# Patient Record
Sex: FEMALE | Race: WHITE | NOT HISPANIC OR LATINO | Employment: OTHER | URBAN - METROPOLITAN AREA
[De-identification: names, ages, dates, MRNs, and addresses within clinical notes are randomized per-mention and may not be internally consistent; named-entity substitution may affect disease eponyms.]

---

## 2018-10-16 ENCOUNTER — OFFICE VISIT (OUTPATIENT)
Dept: OTHER | Facility: IMAGING CENTER | Age: 68
End: 2018-10-16
Payer: MEDICARE

## 2018-10-16 ENCOUNTER — APPOINTMENT (OUTPATIENT)
Dept: OTHER | Facility: IMAGING CENTER | Age: 68
End: 2018-10-16

## 2018-10-16 VITALS
HEIGHT: 62 IN | TEMPERATURE: 97.3 F | DIASTOLIC BLOOD PRESSURE: 58 MMHG | OXYGEN SATURATION: 100 % | BODY MASS INDEX: 28.11 KG/M2 | SYSTOLIC BLOOD PRESSURE: 102 MMHG | HEART RATE: 58 BPM | RESPIRATION RATE: 12 BRPM | WEIGHT: 152.78 LBS

## 2018-10-16 DIAGNOSIS — Z23 NEED FOR INFLUENZA VACCINATION: ICD-10-CM

## 2018-10-16 DIAGNOSIS — N95.2 POSTMENOPAUSE ATROPHIC VAGINITIS: ICD-10-CM

## 2018-10-16 DIAGNOSIS — F51.01 PRIMARY INSOMNIA: ICD-10-CM

## 2018-10-16 DIAGNOSIS — M22.2X2 BILATERAL PATELLOFEMORAL SYNDROME: ICD-10-CM

## 2018-10-16 DIAGNOSIS — M22.2X1 BILATERAL PATELLOFEMORAL SYNDROME: ICD-10-CM

## 2018-10-16 DIAGNOSIS — G43.019 INTRACTABLE MIGRAINE WITHOUT AURA AND WITHOUT STATUS MIGRAINOSUS: ICD-10-CM

## 2018-10-16 DIAGNOSIS — E78.2 MIXED HYPERLIPIDEMIA: ICD-10-CM

## 2018-10-16 DIAGNOSIS — J45.991 COUGH VARIANT ASTHMA: ICD-10-CM

## 2018-10-16 PROBLEM — Z90.710 HX OF TOTAL HYSTERECTOMY: Status: ACTIVE | Noted: 2018-10-16

## 2018-10-16 PROCEDURE — G0008 ADMIN INFLUENZA VIRUS VAC: HCPCS | Performed by: FAMILY MEDICINE

## 2018-10-16 PROCEDURE — 99205 OFFICE O/P NEW HI 60 MIN: CPT | Mod: 25 | Performed by: FAMILY MEDICINE

## 2018-10-16 PROCEDURE — 90662 IIV NO PRSV INCREASED AG IM: CPT | Performed by: FAMILY MEDICINE

## 2018-10-16 RX ORDER — ZOLPIDEM TARTRATE 5 MG/1
5 TABLET ORAL NIGHTLY PRN
COMMUNITY
End: 2019-05-21 | Stop reason: SDUPTHER

## 2018-10-16 RX ORDER — ESTRADIOL 0.5 MG/1
TABLET ORAL
COMMUNITY
Start: 2018-06-08 | End: 2019-05-31

## 2018-10-16 ASSESSMENT — PATIENT HEALTH QUESTIONNAIRE - PHQ9: CLINICAL INTERPRETATION OF PHQ2 SCORE: 0

## 2018-10-16 NOTE — ASSESSMENT & PLAN NOTE
Bilateral inner knee to have alternating tenderness after a wide range of postural exercise.  Patient has noticed the intermittent knee tenderness after prolonged periods of time in intense exercise such as CrossFit training.

## 2018-10-16 NOTE — ASSESSMENT & PLAN NOTE
Over the course of last 25 years patient has been seen to have additional stressors in life.  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

## 2018-10-16 NOTE — PROGRESS NOTES
"Chief Complaint   Patient presents with   • Establish Care     would like to set up for accupuncture     Audrey Aquino is a 68 y.o. female who usually sees her [facility] physician presents today to establish care at [facility] and to discuss bilateral patellofemoral syndrome.    HPI:  Primary insomnia  Over the course of last 25 years patient has been seen to have additional stressors in life.  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    Bilateral patellofemoral syndrome  Bilateral inner knee to have alternating tenderness after a wide range of postural exercise.  Patient has noticed the intermittent knee tenderness after prolonged periods of time in intense exercise such as CrossFit training.    Cough variant asthma  Patient is a chronic problem of cough variant asthma and has been using intermittent Advair to supplement the daily mixed mushroom supplement.  Patient stated that she used to have asthmatic attack every seasonal change which has improved dramatically in the last year since her start of mushroom    Intractable migraine without aura and without status migrainosus  Patient is intractable migraine was originally scheduled to be treated with twice weekly acupuncture which she had find to be having limited utility but does have significant improvement in her primary insomnia therefore patient had been continued with her acupuncture therapy in [place].  Since her moving out of the [place] region and away from [facility] that she felt the sense of \"lost\" from the well-cared-for system.    Mixed hyperlipidemia  Patient was found to be having mixed hyperlipidemia but has not prefer not to take any statin related drug medication.  Patient would like to have additional assistance in " how she can reduce her risk in cardiovascular disease.    Postmenopause atrophic vaginitis  Patient described as having atrophic vaginitis and stated that in the past her OB has put her on estradiol tablet intravaginally off label use.  Discussed with patient of the possibility of using compounding pharmacy and having a more pleasant experience with delivering localized estrogen replacement.  Patient verbalized desire to discuss further.    Depression Screening    Little interest or pleasure in doing things?  0 - not at all  Feeling down, depressed , or hopeless? 0 - not at all  Patient Health Questionnaire Score: 0    If depressive symptoms identified deferred to follow up visit unless specifically addressed in assesment and plan.      Interpretation of PHQ-9 Total Score   Score Severity   1-4 Minimal Depression   5-9 Mild Depression   10-14 Moderate Depression   15-19 Moderately Severe Depression   20-27 Severe Depression    Current medicines (including changes today)  Current Outpatient Prescriptions   Medication Sig Dispense Refill   • fluticasone-salmeterol (ADVAIR DISKUS) 250-50 MCG/DOSE AEROSOL POWDER, BREATH ACTIVATED Inhale 1 puff by mouth 2 times a day . Rinse mouth well after use. (Note: this replaces Dulera)     • tretinoin (RETIN-A) 0.025 % cream Apply to affected area(s) daily at bedtime     • estradiol (ESTRACE) 0.5 MG tablet Take  by mouth.     • zolpidem (AMBIEN) 5 MG Tab Take 5 mg by mouth at bedtime as needed for Sleep.     • CALCIUM-MAGNESIUM PO Take  by mouth.       No current facility-administered medications for this visit.      She  has a past medical history of Asthma; Dry eyes; Frequent headaches; Gingivitis; History of hormone therapy; Hyperlipidemia; Knee pain; Overweight; Shoulder injury; and Sleep disorder.  She  has a past surgical history that includes eye surgery.  Social History   Substance Use Topics   • Smoking status: Never Smoker   • Smokeless tobacco: Never Used   • Alcohol use  "Yes      Comment: 1-2 drinks a week maybe     Family History   Problem Relation Age of Onset   • Cancer Mother         breast     Family Status   Relation Status   • Mo (Not Specified)     Social History     Social History Narrative   • No narrative on file     ROS  Constitutional: Negative for fever, chills, weight loss and malaise/fatigue.   HENT: Negative for ear pain, nosebleeds, congestion, sore throat and neck pain.    Eyes: Negative for blurred vision.   Respiratory: Negative for sputum production, shortness of breath and wheezing.  Positive for infrequent cough,   Cardiovascular: Negative for chest pain, palpitations, orthopnea and leg swelling.   Gastrointestinal: Negative for heartburn, nausea, vomiting and abdominal pain.   Genitourinary: Negative for dysuria, urgency and frequency.   Musculoskeletal: Negative for myalgias, back pain, positive for intermittent alternating knee joint pain post exercise.   Skin: Negative for rash and itching.   Neurological: Negative for dizziness, tingling, tremors, sensory change, focal weakness, positive for frequent daily headaches.   Endo/Heme/Allergies: Does not bruise/bleed easily.   Psychiatric/Behavioral: Negative for depression, anxiety, or memory loss.     All other systems reviewed and are negative except as in HPI.    Labs reviewed with patient:  Reviewed laboratory data from care anywhere from [place].     Objective:   Blood pressure 102/58, pulse (!) 58, temperature 36.3 °C (97.3 °F), temperature source Temporal, resp. rate 12, height 1.575 m (5' 2\"), weight 69.3 kg (152 lb 12.5 oz), SpO2 100 %. Body mass index is 27.94 kg/m².  Physical Exam:  Constitutional: Alert, no distress.  Skin: Warm, dry, good turgor, no rashes in visible areas.  Eye: Equal, round and reactive, conjunctiva clear, lids normal.  ENMT: Lips without lesions, good dentition, oropharynx clear.  Neck: Trachea midline, no masses, no thyromegaly.  Respiratory: Unlabored respiratory effort, " lungs clear to auscultation, no wheezes, no ronchi.  Cardiovascular: Normal S1, S2, no murmur, no edema.  Abdomen: Soft, non-tender, no masses, no hepatosplenomegaly.  Psych: Alert and oriented x3, normal affect and mood.    Assessment and Plan:   The following treatment plan was discussed  1. Cough variant asthma      Discussed of the continual in her mushroom supplement, also continue with lifestyle change in combination with acupuncture.  Can use at.  Discussed Advair use 250-50 1 inhalation prior to bronchospasm to reduce severity of the preceding symptom.   2. Intractable migraine without aura and without status migrainosus      Advised to use prime tea daily for next 6-months, discussed the risks and benefit and possible side effect.  Also continue to use acupuncture with headache reductions and utilize breathing exercise along with guided imagery to help alleviate the severity of the headache.  Discussion ensued about micro-biome.   3. Primary insomnia      Can continue Ambien 5 mg as needed intermittently.  Also discussed with patient of the likely benefit using guided imagery nightly.  Should that is not well tolerated.  Advised to possibly benefit from breathing exercise 2-3 times a day along with her prayer.  Additional discussion about the utility in CBD tincture in the future as her past experience with [employer] cannabis strains.   4. Bilateral patellofemoral syndrome      Discussed the possible benefit from acupuncture and effects in continue with sessions with Dr. Macias.  Also likely will benefit from freeze-dried stinging nettle 450 mg up to twice a day for next 3-month in combination with topical plus CBD cream    5. Postmenopause atrophic vaginitis      Discussed with patient of the present exercise twice a day, and effect of anxiety to the atrophic vaginitis.  Can continue off label use of estradiol vaginally,   6. Mixed hyperlipidemia      Advised to use Natural made cholestoff 1 capsule daily and  combination with lifestyle changes with plans to recheck her lipid panel in Jan 2019   7.      Influenza vaccine           Risks and benefit of flu vaccine discussed patient agrees to take high dose influenza vaccine in office today.    Records requested.  Followup: Return in about 5 weeks (around 11/20/2018).    Spent 65 minutes in face-to-tace patient contact in which greater than 50% of the visit was spent in counseling and coordination of care including Patellofemoral syndrome management options, Answering patient questions about Primary insomnia, Concerns and potential risks related to Intractable migraine, Discussing in depth the importance of primary prevention of Status asthmaticus, Discussing the nature of Atrophic vaginitis and Patient is also educated about lifestyle modifications which may improve Dyslipidemia, bilateral patellofemoral syndrome, primary insomnia, migraine, cough variant asthma.  We also reviewed PMH, family history, and each of her chronic diagnoses in regards to current management, specialty physicians, symptom control, and future planning.  Please refer to assessment and plan/discussion/recommendations for additional details.        I have worked with consultants from the vendor as well as technical experts from [employer] to optimize the interface. I have made every reasonable attempt to correct obvious errors, but I expect that there are errors of grammar and possibly content that I did not discover before finalizing the note.

## 2018-10-22 NOTE — ASSESSMENT & PLAN NOTE
"Patient is intractable migraine was originally scheduled to be treated with twice weekly acupuncture which she had find to be having limited utility but does have significant improvement in her primary insomnia therefore patient had been continued with her acupuncture therapy in [place].  Since her moving out of the [facility] and away from [facility] that she felt the sense of \"lost\" from the well-cared-for system.  "

## 2018-10-22 NOTE — ASSESSMENT & PLAN NOTE
Patient was found to be having mixed hyperlipidemia but has not prefer not to take any statin related drug medication.  Patient would like to have additional assistance in how she can reduce her risk in cardiovascular disease.

## 2018-10-22 NOTE — ASSESSMENT & PLAN NOTE
Patient described as having atrophic vaginitis and stated that in the past her OB has put her on estradiol tablet intravaginally off label use.  Discussed with patient of the possibility of using compounding pharmacy and having a more pleasant experience with delivering localized estrogen replacement.  Patient verbalized desire to discuss further.

## 2018-11-07 ENCOUNTER — APPOINTMENT (OUTPATIENT)
Dept: OTHER | Facility: IMAGING CENTER | Age: 68
End: 2018-11-07

## 2018-11-15 ENCOUNTER — APPOINTMENT (OUTPATIENT)
Dept: OTHER | Facility: IMAGING CENTER | Age: 68
End: 2018-11-15

## 2018-12-12 ENCOUNTER — APPOINTMENT (OUTPATIENT)
Dept: OTHER | Facility: IMAGING CENTER | Age: 68
End: 2018-12-12

## 2018-12-12 ENCOUNTER — OFFICE VISIT (OUTPATIENT)
Dept: OTHER | Facility: IMAGING CENTER | Age: 68
End: 2018-12-12
Payer: MEDICARE

## 2018-12-12 VITALS
DIASTOLIC BLOOD PRESSURE: 60 MMHG | TEMPERATURE: 97.1 F | HEART RATE: 54 BPM | WEIGHT: 155.6 LBS | BODY MASS INDEX: 28.63 KG/M2 | SYSTOLIC BLOOD PRESSURE: 108 MMHG | OXYGEN SATURATION: 98 % | RESPIRATION RATE: 12 BRPM | HEIGHT: 62 IN

## 2018-12-12 DIAGNOSIS — M65.341 TRIGGER RING FINGER OF RIGHT HAND: ICD-10-CM

## 2018-12-12 DIAGNOSIS — F51.01 PRIMARY INSOMNIA: ICD-10-CM

## 2018-12-12 DIAGNOSIS — G43.019 INTRACTABLE MIGRAINE WITHOUT AURA AND WITHOUT STATUS MIGRAINOSUS: ICD-10-CM

## 2018-12-12 PROCEDURE — 99214 OFFICE O/P EST MOD 30 MIN: CPT | Performed by: FAMILY MEDICINE

## 2018-12-12 NOTE — PATIENT INSTRUCTIONS
Tart cherry 1000 mg twice daily for 1 month.  Then 1000 mg daily afterward.    Continue sting nettle 500 mg BID, cholestoff,

## 2018-12-13 PROBLEM — M65.341 TRIGGER RING FINGER OF RIGHT HAND: Status: ACTIVE | Noted: 2018-12-13

## 2018-12-13 RX ORDER — CYCLOSPORINE 0.5 MG/ML
1 EMULSION OPHTHALMIC 2 TIMES DAILY
COMMUNITY
Start: 2018-06-11 | End: 2019-06-03

## 2018-12-13 NOTE — ASSESSMENT & PLAN NOTE
Patient reports stable mild improvement in sleeping and as she was able to adhere with the relaxation routine and self improvement.

## 2018-12-13 NOTE — ASSESSMENT & PLAN NOTE
Her headache has been improved with her consistent with her yoga practices but admits that she could pick back up with her understanding with qigong and improvement with her diet.  She is also in the process with moving to permanent home from living with her son in the past few months.

## 2018-12-13 NOTE — PROGRESS NOTES
Chief Complaint   Patient presents with   • Insomnia     stable, moving into [facility] home soon    • Headache     stable   • Trigger Finger     right 4th finger      Subjective:   Audrey Aquino is a 68 y.o. female here today for multiple problems as listed below.      Trigger ring finger of right hand  Recurrent issue that she had from before, she previously had been injected with steroids at the [facility] system which had temporarily alleviated the problem.  Patient was on a rock climber and had been crossing her 4th finger to 3rd finger while she was doing the hand grab for the stability.    Intractable migraine without aura and without status migrainosus  Her headache has been improved with her consistent with her yoga practices but admits that she could pick back up with her understanding with qigong and improvement with her diet.  She is also in the process with moving to permanent home from living with her son in the past few months.      Primary insomnia  Patient reports stable mild improvement in sleeping and as she was able to adhere with the relaxation routine and self improvement.    Current medicines (including changes today)  Current Outpatient Prescriptions   Medication Sig Dispense Refill   • cyclosporin (RESTASIS) 0.05 % ophthalmic emulsion 1 Drop by Ophthalmic route 2 Times a Day.     • estradiol (ESTRACE) 0.5 MG tablet Take  by mouth.     • CALCIUM-MAGNESIUM PO Take  by mouth.     • fluticasone-salmeterol (ADVAIR DISKUS) 250-50 MCG/DOSE AEROSOL POWDER, BREATH ACTIVATED Inhale 1 puff by mouth 2 times a day . Rinse mouth well after use. (Note: this replaces Dulera)     • tretinoin (RETIN-A) 0.025 % cream Apply to affected area(s) daily at bedtime     • zolpidem (AMBIEN) 5 MG Tab Take 5 mg by mouth at bedtime as needed for Sleep.       No current facility-administered medications for this visit.      She  has a past medical history of Asthma; Dry eyes; Frequent headaches; Gingivitis; History of  "hormone therapy; Hyperlipidemia; Knee pain; Overweight; Shoulder injury; and Sleep disorder.    ROS  No chest pain, no shortness of breath, no abdominal pain, no diarrhea/constipation, no urinary symptoms.     Objective:     Blood pressure 108/60, pulse (!) 54, temperature 36.2 °C (97.1 °F), temperature source Temporal, resp. rate 12, height 1.575 m (5' 2\"), weight 70.6 kg (155 lb 9.6 oz), SpO2 98 %. Body mass index is 28.46 kg/m².   Physical Exam:  Alert, oriented in no acute distress.  Eye contact is good, speech goal directed, affect calm  HEENT: conjunctiva non-injected, sclera non-icteric.  Pinna normal. TM pearly gray. Oral mucous membranes pink and moist with no lesions.  Neck: No adenopathy or masses in the neck or supraclavicular regions.  Lungs: clear to auscultation bilaterally with good excursion.  CV: regular rate and rhythm.  Abdomen: soft, nontender, No CVAT  Ext: no edema, color normal, vascularity normal, temperature normal    Assessment and Plan:   The following treatment plan was discussed  1. Primary insomnia      Patient could possibly benefit from adding tart cherry in the effort of assisting with her anxieties.  In the meantime continue with the daily yoga practice that she was practicing.  Discussed with the anticipation of the pending change in environment for the patient and the possibility of increasing supplemental qigong practices to assist with her condition.   2. Intractable migraine without aura and without status migrainosus      Improved severity but has not seeing resolution of her frequency, recommend continue with acupuncture sessions when she can.     3. Trigger ring finger of right hand      Discussed the possible benefit from adding Tart cherry 1000 mg twice daily for 1 month.  Then 1000 mg daily afterward.  Continue with stining nettle but could benefit from considering adding cholestoff for the plant phytosterol benefits.       Followup: Return in about 8 weeks (around " 2/6/2019).    Spent 25 minutes in face-to-tace patient contact in which greater than 50% of the visit was spent in counseling and coordination of care including Primary insomnia management options, Answering patient questions about Intractable migraine without aura, Concerns and potential risks related to Trigger finger, Discussing in depth the importance of primary prevention of Tendon rupture, Discussing the nature of Right hand trigger finger, intractable migraine and Patient is also educated about lifestyle modifications which may improve Insomnia, migraine, trigger finger, night anxiety.  We also reviewed PMH, family history, and each of her chronic diagnoses in regards to current management, specialty physicians, symptom control, and future planning. Please refer to assessment and plan/discussion/recommendations for additional details.        Please note that dictation has been dictated using voice recognition soft ware. I have made every reasonable attempt to correct obvious errors, but I expect that there are errors of grammar and possibly content that I did not discover before finalizing the note.

## 2018-12-13 NOTE — ASSESSMENT & PLAN NOTE
Recurrent issue that she had from before, she previously had been injected with steroids at the [facility] system which had temporarily alleviated the problem.  Patient was on a rock climber and had been crossing her 4th finger to 3rd finger while she was doing the hand grab for the stability.

## 2018-12-21 ENCOUNTER — APPOINTMENT (OUTPATIENT)
Dept: DERMATOLOGY | Facility: IMAGING CENTER | Age: 68
End: 2018-12-21

## 2019-01-03 ENCOUNTER — TELEPHONE (OUTPATIENT)
Dept: OTHER | Facility: IMAGING CENTER | Age: 69
End: 2019-01-03

## 2019-01-03 DIAGNOSIS — Z12.31 BREAST CANCER SCREENING BY MAMMOGRAM: ICD-10-CM

## 2019-01-03 NOTE — TELEPHONE ENCOUNTER
Received e-mail from patient regarding health maintenance prompts. Patient wanted her health maintenance updated from her [facility] records. Updated information found from records. Let patient know she is still due for the Shingles vaccine and a mammogram. Patient said she had the Zostavax vaccine around the time she was 61 years old and that should have come from the [facility] records. Let patient know there is a new shingles vaccine called Shingrex and the recommendation is that patient who have received the Zostavax should still receive the Shingrex. Let patient know it is in high demand and we currently have a long waiting list but since she has medicare, she would need to receive it from the pharmacy.   Patient was also wondering whether or not she needed a referral/order for a mammogram or if she could just walk in. I let her know I was not 100% sure about what medicare requires but if she contacted her insurance they could better tell her. Also let patient know I would inform Dr. Smith and he could write an order for a mammogram for her.

## 2019-01-10 ENCOUNTER — APPOINTMENT (OUTPATIENT)
Dept: DERMATOLOGY | Facility: IMAGING CENTER | Age: 69
End: 2019-01-10

## 2019-02-20 ENCOUNTER — APPOINTMENT (OUTPATIENT)
Dept: OTHER | Facility: IMAGING CENTER | Age: 69
End: 2019-02-20

## 2019-02-20 ENCOUNTER — HOSPITAL ENCOUNTER (OUTPATIENT)
Dept: RADIOLOGY | Facility: MEDICAL CENTER | Age: 69
End: 2019-02-20
Attending: FAMILY MEDICINE
Payer: MEDICARE

## 2019-02-20 DIAGNOSIS — Z12.31 BREAST CANCER SCREENING BY MAMMOGRAM: ICD-10-CM

## 2019-02-20 PROCEDURE — 77063 BREAST TOMOSYNTHESIS BI: CPT

## 2019-03-01 ENCOUNTER — HOSPITAL ENCOUNTER (OUTPATIENT)
Dept: RADIOLOGY | Facility: MEDICAL CENTER | Age: 69
End: 2019-03-01

## 2019-03-01 ENCOUNTER — APPOINTMENT (OUTPATIENT)
Dept: DERMATOLOGY | Facility: IMAGING CENTER | Age: 69
End: 2019-03-01

## 2019-03-05 ENCOUNTER — HOSPITAL ENCOUNTER (OUTPATIENT)
Dept: RADIOLOGY | Facility: MEDICAL CENTER | Age: 69
End: 2019-03-05
Attending: FAMILY MEDICINE
Payer: MEDICARE

## 2019-03-05 DIAGNOSIS — R92.8 ABNORMAL MAMMOGRAM: ICD-10-CM

## 2019-03-05 PROCEDURE — 77065 DX MAMMO INCL CAD UNI: CPT | Mod: LT

## 2019-03-05 PROCEDURE — 76642 ULTRASOUND BREAST LIMITED: CPT | Mod: LT

## 2019-03-06 ENCOUNTER — APPOINTMENT (OUTPATIENT)
Dept: OTHER | Facility: IMAGING CENTER | Age: 69
End: 2019-03-06

## 2019-05-21 ENCOUNTER — PATIENT MESSAGE (OUTPATIENT)
Dept: MEDICAL GROUP | Facility: IMAGING CENTER | Age: 69
End: 2019-05-21

## 2019-05-21 DIAGNOSIS — F51.01 PRIMARY INSOMNIA: ICD-10-CM

## 2019-05-21 DIAGNOSIS — E78.2 MIXED HYPERLIPIDEMIA: ICD-10-CM

## 2019-05-21 DIAGNOSIS — G43.019 INTRACTABLE MIGRAINE WITHOUT AURA AND WITHOUT STATUS MIGRAINOSUS: ICD-10-CM

## 2019-05-21 DIAGNOSIS — G47.33 OBSTRUCTIVE SLEEP APNEA SYNDROME: ICD-10-CM

## 2019-05-21 DIAGNOSIS — J45.991 COUGH VARIANT ASTHMA: ICD-10-CM

## 2019-05-21 DIAGNOSIS — Z90.710 HX OF TOTAL HYSTERECTOMY: ICD-10-CM

## 2019-05-21 DIAGNOSIS — N95.2 POSTMENOPAUSE ATROPHIC VAGINITIS: ICD-10-CM

## 2019-05-28 ENCOUNTER — HOSPITAL ENCOUNTER (OUTPATIENT)
Dept: LAB | Facility: MEDICAL CENTER | Age: 69
End: 2019-05-28
Attending: FAMILY MEDICINE
Payer: MEDICARE

## 2019-05-28 DIAGNOSIS — G47.33 OBSTRUCTIVE SLEEP APNEA SYNDROME: ICD-10-CM

## 2019-05-28 DIAGNOSIS — Z90.710 HX OF TOTAL HYSTERECTOMY: ICD-10-CM

## 2019-05-28 DIAGNOSIS — N95.2 POSTMENOPAUSE ATROPHIC VAGINITIS: ICD-10-CM

## 2019-05-28 DIAGNOSIS — J45.991 COUGH VARIANT ASTHMA: ICD-10-CM

## 2019-05-28 DIAGNOSIS — G43.019 INTRACTABLE MIGRAINE WITHOUT AURA AND WITHOUT STATUS MIGRAINOSUS: ICD-10-CM

## 2019-05-28 DIAGNOSIS — F51.01 PRIMARY INSOMNIA: ICD-10-CM

## 2019-05-28 LAB
ALBUMIN SERPL BCP-MCNC: 4.4 G/DL (ref 3.2–4.9)
ALBUMIN/GLOB SERPL: 1.8 G/DL
ALP SERPL-CCNC: 104 U/L (ref 30–99)
ALT SERPL-CCNC: 18 U/L (ref 2–50)
ANION GAP SERPL CALC-SCNC: 9 MMOL/L (ref 0–11.9)
AST SERPL-CCNC: 22 U/L (ref 12–45)
BASOPHILS # BLD AUTO: 1.1 % (ref 0–1.8)
BASOPHILS # BLD: 0.08 K/UL (ref 0–0.12)
BILIRUB SERPL-MCNC: 0.6 MG/DL (ref 0.1–1.5)
BUN SERPL-MCNC: 14 MG/DL (ref 8–22)
CALCIUM SERPL-MCNC: 9.3 MG/DL (ref 8.5–10.5)
CHLORIDE SERPL-SCNC: 106 MMOL/L (ref 96–112)
CO2 SERPL-SCNC: 24 MMOL/L (ref 20–33)
CREAT SERPL-MCNC: 0.71 MG/DL (ref 0.5–1.4)
DHEA-S SERPL-MCNC: 52.4 UG/DL (ref 9.4–246)
EOSINOPHIL # BLD AUTO: 0.24 K/UL (ref 0–0.51)
EOSINOPHIL NFR BLD: 3.2 % (ref 0–6.9)
ERYTHROCYTE [DISTWIDTH] IN BLOOD BY AUTOMATED COUNT: 42.3 FL (ref 35.9–50)
ESTRADIOL SERPL-MCNC: 640 PG/ML
GLOBULIN SER CALC-MCNC: 2.4 G/DL (ref 1.9–3.5)
GLUCOSE SERPL-MCNC: 83 MG/DL (ref 65–99)
HCT VFR BLD AUTO: 44.4 % (ref 37–47)
HGB BLD-MCNC: 14.7 G/DL (ref 12–16)
IMM GRANULOCYTES # BLD AUTO: 0.06 K/UL (ref 0–0.11)
IMM GRANULOCYTES NFR BLD AUTO: 0.8 % (ref 0–0.9)
LYMPHOCYTES # BLD AUTO: 2.22 K/UL (ref 1–4.8)
LYMPHOCYTES NFR BLD: 29.9 % (ref 22–41)
MCH RBC QN AUTO: 29.1 PG (ref 27–33)
MCHC RBC AUTO-ENTMCNC: 33.1 G/DL (ref 33.6–35)
MCV RBC AUTO: 87.7 FL (ref 81.4–97.8)
MONOCYTES # BLD AUTO: 0.42 K/UL (ref 0–0.85)
MONOCYTES NFR BLD AUTO: 5.7 % (ref 0–13.4)
NEUTROPHILS # BLD AUTO: 4.4 K/UL (ref 2–7.15)
NEUTROPHILS NFR BLD: 59.3 % (ref 44–72)
NRBC # BLD AUTO: 0 K/UL
NRBC BLD-RTO: 0 /100 WBC
PLATELET # BLD AUTO: 302 K/UL (ref 164–446)
PMV BLD AUTO: 9.9 FL (ref 9–12.9)
POTASSIUM SERPL-SCNC: 3.8 MMOL/L (ref 3.6–5.5)
PROGEST SERPL-MCNC: <0.08 NG/ML
PROT SERPL-MCNC: 6.8 G/DL (ref 6–8.2)
RBC # BLD AUTO: 5.06 M/UL (ref 4.2–5.4)
SODIUM SERPL-SCNC: 139 MMOL/L (ref 135–145)
T4 FREE SERPL-MCNC: 0.8 NG/DL (ref 0.53–1.43)
TSH SERPL DL<=0.005 MIU/L-ACNC: 3.03 UIU/ML (ref 0.38–5.33)
WBC # BLD AUTO: 7.4 K/UL (ref 4.8–10.8)

## 2019-05-28 PROCEDURE — 82670 ASSAY OF TOTAL ESTRADIOL: CPT

## 2019-05-28 PROCEDURE — 84144 ASSAY OF PROGESTERONE: CPT

## 2019-05-28 PROCEDURE — 82627 DEHYDROEPIANDROSTERONE: CPT

## 2019-05-28 PROCEDURE — 36415 COLL VENOUS BLD VENIPUNCTURE: CPT

## 2019-05-28 PROCEDURE — 84439 ASSAY OF FREE THYROXINE: CPT | Mod: GA

## 2019-05-28 PROCEDURE — 83704 LIPOPROTEIN BLD QUAN PART: CPT | Mod: GA

## 2019-05-28 PROCEDURE — 84270 ASSAY OF SEX HORMONE GLOBUL: CPT

## 2019-05-28 PROCEDURE — 84402 ASSAY OF FREE TESTOSTERONE: CPT

## 2019-05-28 PROCEDURE — 80061 LIPID PANEL: CPT | Mod: GA,XU

## 2019-05-28 PROCEDURE — 84443 ASSAY THYROID STIM HORMONE: CPT | Mod: GA

## 2019-05-28 PROCEDURE — 80053 COMPREHEN METABOLIC PANEL: CPT

## 2019-05-28 PROCEDURE — 85025 COMPLETE CBC W/AUTO DIFF WBC: CPT

## 2019-05-30 LAB — SHBG SERPL-SCNC: 50 NMOL/L (ref 30–135)

## 2019-06-01 LAB
CHOLEST SERPL-MCNC: 191 MG/DL
HDL PARTICAL NO Q4363: 30.6 UMOL/L
HDL SIZE Q4361: 8.9 NM
HDLC SERPL-MCNC: 33 MG/DL (ref 40–59)
HLD.LARGE SERPL-SCNC: 5.4 UMOL/L
L VLDL PART NO Q4357: 23.5 NMOL/L
LDL SERPL QN: 19.9 NM
LDL SERPL-SCNC: 1744 NMOL/L
LDL SMALL SERPL-SCNC: 671 NMOL/L
LDLC SERPL CALC-MCNC: ABNORMAL MG/DL
PATHOLOGY STUDY: ABNORMAL
TESTOST FREE SERPL-MCNC: 1.7 PG/ML (ref 0.6–3.8)
TRIGL SERPL-MCNC: 476 MG/DL (ref 30–149)
VLDL SIZE Q4362: 58.2 NM

## 2019-06-03 ENCOUNTER — OFFICE VISIT (OUTPATIENT)
Dept: MEDICAL GROUP | Facility: IMAGING CENTER | Age: 69
End: 2019-06-03
Payer: MEDICARE

## 2019-06-03 ENCOUNTER — HOSPITAL ENCOUNTER (OUTPATIENT)
Dept: RADIOLOGY | Facility: MEDICAL CENTER | Age: 69
End: 2019-06-03
Attending: FAMILY MEDICINE
Payer: MEDICARE

## 2019-06-03 ENCOUNTER — TELEPHONE (OUTPATIENT)
Dept: MEDICAL GROUP | Facility: IMAGING CENTER | Age: 69
End: 2019-06-03

## 2019-06-03 VITALS
WEIGHT: 148.8 LBS | RESPIRATION RATE: 12 BRPM | OXYGEN SATURATION: 96 % | BODY MASS INDEX: 27.38 KG/M2 | HEART RATE: 60 BPM | SYSTOLIC BLOOD PRESSURE: 92 MMHG | HEIGHT: 62 IN | TEMPERATURE: 98.5 F | DIASTOLIC BLOOD PRESSURE: 60 MMHG

## 2019-06-03 DIAGNOSIS — E78.2 MIXED HYPERLIPIDEMIA: ICD-10-CM

## 2019-06-03 DIAGNOSIS — M54.2 CERVICALGIA: ICD-10-CM

## 2019-06-03 DIAGNOSIS — N95.2 POSTMENOPAUSE ATROPHIC VAGINITIS: ICD-10-CM

## 2019-06-03 DIAGNOSIS — F51.01 PRIMARY INSOMNIA: ICD-10-CM

## 2019-06-03 DIAGNOSIS — N95.2 VAGINAL ATROPHY: ICD-10-CM

## 2019-06-03 PROCEDURE — 99214 OFFICE O/P EST MOD 30 MIN: CPT | Performed by: FAMILY MEDICINE

## 2019-06-03 PROCEDURE — 72050 X-RAY EXAM NECK SPINE 4/5VWS: CPT

## 2019-06-03 RX ORDER — ESTRADIOL 10 UG/1
10 INSERT VAGINAL DAILY
Qty: 31 TAB | Refills: 3 | Status: SHIPPED | OUTPATIENT
Start: 2019-06-03 | End: 2019-09-04 | Stop reason: SDUPTHER

## 2019-06-03 ASSESSMENT — PAIN SCALES - GENERAL: PAINLEVEL: 5=MODERATE PAIN

## 2019-06-03 ASSESSMENT — PATIENT HEALTH QUESTIONNAIRE - PHQ9: CLINICAL INTERPRETATION OF PHQ2 SCORE: 0

## 2019-06-03 NOTE — TELEPHONE ENCOUNTER
Received fax from pharmacy stating patient needs PA for vagifem prescription. Patient's last estradiol labs were slightly elevated but she thought it was due to placing estradiol tablet in vagina few hours before labs. Would you like to recheck labs before submitting PA for prescription?

## 2019-06-03 NOTE — PROGRESS NOTES
Chief Complaint   Patient presents with   • Neck Pain   • Referral Needed     sleep study   • Results     Subjective:   Audrey Aquino is a 68 y.o. female here today for multiple problems as listed below.      Foraminal stenosis of cervical region  Patient has neck problem continues to be an issue, with the previous study discussed with result that it could be potentially useful for further evaluation of the segmental compression possibility.    Mixed hyperlipidemia  Patient's mixed dyslipidemia has tolerated cholestoff successfully thus far.  Discussed with patient of the possible relationship in between the hormonal replacement therapy in altering patients number that patient provided her vaginal atrophy is still fairly significant and bothersome.  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    Primary insomnia  Her insomnia is reported to been stable.    Vaginal atrophy  Patient described atrophic vaginosis has continued.  Patient also had expressed her concern with the determination of atrophic vaginitis versus atrophic vagina despite of the ICD 10 code would be exactly the same for the insurance purposes.  Over the course of last month patient also had no problem with Vagifem daily and estradiol cream biweekly     Depression Screening    Little interest or pleasure in doing things?  0 - not at all  Feeling down, depressed , or hopeless? 0 - not at all  Patient Health Questionnaire Score: 0    If depressive symptoms identified deferred to follow up visit unless specifically addressed in assesment and plan.      Interpretation of PHQ-9 Total Score   Score Severity   1-4 Minimal Depression   5-9 Mild Depression   10-14 Moderate Depression   15-19 Moderately Severe Depression   20-27 Severe Depression         Current medicines (including changes today)  Current Outpatient  "Prescriptions   Medication Sig Dispense Refill   • Estradiol 0.025 MG/24HR PATCH BIWEEKLY Apply 1 Patch to skin as directed every 14 days. 8 Patch 3   • Plant Sterols and Stanols (CHOLESTOFF PO) Take  by mouth.     • estradiol (VAGIFEM) 10 MCG Tab Insert 1 Tab in vagina every day. 31 Tab 3   • CALCIUM-MAGNESIUM PO Take  by mouth.     • fluticasone-salmeterol (ADVAIR DISKUS) 250-50 MCG/DOSE AEROSOL POWDER, BREATH ACTIVATED Inhale 1 puff by mouth 2 times a day . Rinse mouth well after use. (Note: this replaces Dulera)     • tretinoin (RETIN-A) 0.025 % cream Apply to affected area(s) daily at bedtime       No current facility-administered medications for this visit.      She  has a past medical history of Asthma; Dry eyes; Frequent headaches; Gingivitis; History of hormone therapy; Hyperlipidemia; Knee pain; Overweight; Shoulder injury; and Sleep disorder.    ROS  No chest pain, no shortness of breath, no abdominal pain, no diarrhea/constipation, no urinary symptoms.     Objective:     BP (!) 92/60   Pulse 60   Temp 36.9 °C (98.5 °F)   Resp 12   Ht 1.575 m (5' 2\")   Wt 67.5 kg (148 lb 12.8 oz)   SpO2 96%  Body mass index is 27.22 kg/m².   Physical Exam:  Alert, oriented in no acute distress.  Eye contact is good, speech goal directed, affect calm  HEENT: conjunctiva non-injected, sclera non-icteric.  Pinna normal. TM pearly gray. Oral mucous membranes pink and moist with no lesions.  Neck: No adenopathy or masses in the neck or supraclavicular regions.  Lungs: clear to auscultation bilaterally with good excursion.  CV: regular rate and rhythm.  Abdomen: soft, nontender, No CVAT  Ext: no edema, color normal, vascularity normal, temperature normal    Assessment and Plan:   The following treatment plan was discussed    1. Mixed hyperlipidemia  Lipid Profile    We will recheck patient's lipid level while continue plant sterol supplements.  Start gamma Oryzanol 60 mg twice daily with risks and benefit discussed   2. " Cervicalgia  MR-CERVICAL SPINE-W/O    CANCELED: DX-CERVICAL SPINE-4+ VIEWS    Start cat's claw 500 mg daily for neck pain, also will investigate MRI of the cervical spine.  With possible anticipation of PMR referral should be visualized some of the possible sources of pain were correlated to the finding.   3. Primary insomnia  REFERRAL TO SLEEP STUDIES    Estradiol 0.025 MG/24HR PATCH BIWEEKLY    Discussed with patient of continual sleep disturbance that we will refer for sleep studies with possible starting point of nighttime oxygen saturation study first.  Still encourage patient to continue with acupuncture therapy, qigong exercise.   4. Postmenopause atrophic vaginitis  estradiol (VAGIFEM) 10 MCG Tab    Estradiol 0.025 MG/24HR PATCH BIWEEKLY    ESTRADIOL    PROGESTERONE    Discussed with patient of continuation of estrogen replacement and in the meanwhile attempt to refill patient's current medication   5. Vaginal atrophy      Discussed with different strategies in vaginal perfusion improvement but with patient's current constraint in her life that continual replacement with some dosage at this time will be prudent.       Followup: Return in about 2 months (around 8/3/2019).    Spent 25 minutes in face-to-tace patient contact in which greater than 50% of the visit was spent in counseling and coordination of care including Mixed dyslipidemia management options, Answering patient questions about Neck pain, Concerns and potential risks related to Cervical compression, Discussing in depth the importance of primary prevention of Muscle atrophy, Discussing the nature of Atrophic vagina and Patient is also educated about lifestyle modifications which may improve Atrophic vaginosis, primary insomnia, cervicalgia, mixed hyperlipidemia  We also reviewed PMH, family history, and each of her chronic diagnoses in regards to current management, specialty physicians, symptom control, and future planning. Please refer to  assessment and plan/discussion/recommendations for additional details.        Please note that dictation has been dictated using voice recognition soft ware. I have made every reasonable attempt to correct obvious errors, but I expect that there are errors of grammar and possibly content that I did not discover before finalizing the note.

## 2019-06-03 NOTE — PATIENT INSTRUCTIONS
Gamma Oryzanol 60 mg twice daily in additional to reduce continue Cholestoff 2 capsule daily.    Cat's claw 500 mg daily for neck pain

## 2019-06-04 ENCOUNTER — TELEPHONE (OUTPATIENT)
Dept: MEDICAL GROUP | Facility: IMAGING CENTER | Age: 69
End: 2019-06-04

## 2019-06-04 RX ORDER — ESTRADIOL 0.03 MG/D
1 FILM, EXTENDED RELEASE TRANSDERMAL
Qty: 8 PATCH | Refills: 3 | Status: SHIPPED | OUTPATIENT
Start: 2019-06-04 | End: 2019-09-04 | Stop reason: SDUPTHER

## 2019-06-04 NOTE — TELEPHONE ENCOUNTER
"Received phone call from Yobany at [facility] pharmacy regarding patients estradiol patch. He states usually the estradiol patch is used twice a week. Let him know I would verify with Dr. Smith.      Spoke with Dr. Smith and he verified it should be \"use one patch twice a week.\"  "

## 2019-06-06 ENCOUNTER — HOSPITAL ENCOUNTER (OUTPATIENT)
Dept: RADIOLOGY | Facility: MEDICAL CENTER | Age: 69
End: 2019-06-06
Attending: FAMILY MEDICINE
Payer: MEDICARE

## 2019-06-06 DIAGNOSIS — M54.2 CERVICALGIA: ICD-10-CM

## 2019-06-06 PROCEDURE — 72141 MRI NECK SPINE W/O DYE: CPT

## 2019-06-10 PROBLEM — M48.02 FORAMINAL STENOSIS OF CERVICAL REGION: Status: ACTIVE | Noted: 2019-06-03

## 2019-07-07 NOTE — ASSESSMENT & PLAN NOTE
Patient described atrophic vaginosis has continued.  Patient also had expressed her concern with the determination of atrophic vaginitis versus atrophic vagina despite of the ICD 10 code would be exactly the same for the insurance purposes.  Over the course of last month patient also had no problem with Vagifem daily and estradiol cream biweekly

## 2019-07-07 NOTE — ASSESSMENT & PLAN NOTE
Patient's mixed dyslipidemia has tolerated cholestoff successfully thus far.  Discussed with patient of the possible relationship in between the hormonal replacement therapy in altering patients number that patient provided her vaginal atrophy is still fairly significant and bothersome.  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

## 2019-07-07 NOTE — ASSESSMENT & PLAN NOTE
Patient has neck problem continues to be an issue, with the previous study discussed with result that it could be potentially useful for further evaluation of the segmental compression possibility.

## 2019-07-31 ENCOUNTER — TELEPHONE (OUTPATIENT)
Dept: MEDICAL GROUP | Facility: IMAGING CENTER | Age: 69
End: 2019-07-31

## 2019-07-31 NOTE — TELEPHONE ENCOUNTER
----- Message from Dileep Cadena sent at 7/30/2019  1:33 PM PDT -----  Contact: [phone]  Melany Restrepo and Tiffanie Brown from [facility] in [place] has a question regarding a prescription for Estradiol tablets from Dr. Smith for Audrey Aquino. Tiffanie is wondering if Dr. Smith would like the tablet inserted daily for 30 days? Please call Tiffanie at [phone]. Thanks, Dileep

## 2019-07-31 NOTE — TELEPHONE ENCOUNTER
Verified with Dr. Smith that estradiol vaginal insert tablet is for daily use. Notified Kenyatta at [facility] pharmacy.

## 2019-08-05 ENCOUNTER — OFFICE VISIT (OUTPATIENT)
Dept: PHYSICAL MEDICINE AND REHAB | Facility: MEDICAL CENTER | Age: 69
End: 2019-08-05
Payer: MEDICARE

## 2019-08-05 VITALS
OXYGEN SATURATION: 97 % | TEMPERATURE: 97.2 F | HEART RATE: 72 BPM | SYSTOLIC BLOOD PRESSURE: 92 MMHG | WEIGHT: 154.1 LBS | DIASTOLIC BLOOD PRESSURE: 60 MMHG | HEIGHT: 62 IN | BODY MASS INDEX: 28.36 KG/M2

## 2019-08-05 DIAGNOSIS — M25.561 CHRONIC PAIN OF BOTH KNEES: ICD-10-CM

## 2019-08-05 DIAGNOSIS — G89.29 CHRONIC PAIN OF BOTH KNEES: ICD-10-CM

## 2019-08-05 DIAGNOSIS — M47.812 SPONDYLOSIS OF CERVICAL REGION WITHOUT MYELOPATHY OR RADICULOPATHY: ICD-10-CM

## 2019-08-05 DIAGNOSIS — M25.562 CHRONIC PAIN OF BOTH KNEES: ICD-10-CM

## 2019-08-05 PROCEDURE — 76882 US LMTD JT/FCL EVL NVASC XTR: CPT | Mod: RT | Performed by: PHYSICAL MEDICINE & REHABILITATION

## 2019-08-05 PROCEDURE — 99205 OFFICE O/P NEW HI 60 MIN: CPT | Mod: 25 | Performed by: PHYSICAL MEDICINE & REHABILITATION

## 2019-08-05 ASSESSMENT — PAIN SCALES - GENERAL: PAINLEVEL: 3=SLIGHT PAIN

## 2019-08-05 ASSESSMENT — ENCOUNTER SYMPTOMS
PARESIS: 0
NUMBNESS: 0
NECK PAIN: 1
TINGLING: 0
WEAKNESS: 0

## 2019-08-05 ASSESSMENT — PATIENT HEALTH QUESTIONNAIRE - PHQ9: CLINICAL INTERPRETATION OF PHQ2 SCORE: 0

## 2019-08-05 NOTE — Clinical Note
Dear Murtaza Smith D.O. , Thank you for the referral of Audrey Aquino.  Please see my note for more details Should you have any questions or concerns please do not hesitate to contact me. Ru Estrada M.D.

## 2019-08-05 NOTE — PROGRESS NOTES
New patient note    Physiatry (physical medicine and  Rehabilitation), interventional spine and sports medicine    Date of Service: 8/5/2019    Chief complaint:   Chief Complaint   Patient presents with   • New Patient     Neck pain        HISTORY    HPI: Audrey Aquino 69 y.o. female who presents today with Diagnoses of Spondylosis of cervical region without myelopathy or radiculopathy and Chronic pain of both knees were pertinent to this visit.     Neck Pain     This is a chronic problem. Episode onset: Jan 2019.  The problem occurs intermittently. The problem has been waxing and waning. The pain is associated with nothing. The pain is present in the right side. The quality of the pain is described as aching. The pain is moderate. Exacerbated by: neck movements.  Pertinent negatives include no numbness, paresis, tingling or weakness. She has tried acetaminophen, neck support and home exercises (tramadol, craniosacral therapy) for the symptoms.    Knee Pain    This is a chronic problem. Associated symptoms include neck pain. Pertinent negatives include no numbness or weakness. Exacerbated by: stairs.  She has tried acetaminophen, heat, ice, walking and rest (strength training, home exercise program) for the symptoms.      Patient also has trigger finger of the right ring finger.  This is chronic.  Improving with stretching.  Now happening approximately 4 times weekly.  She had one injection in the past.  This was done 3 years ago. this was done blind.     tried tylenol. Did not tolerate ibuprofen because of GRF around 61. Tried tramadol and CBD. She also does yoga and stretching.           ROS:   Red Flags ROS:   Fever, Chills, Sweats: Denies  Involuntary Weight Loss: Denies  Bladder Incontinence: Denies  Bowel Incontinence: denies  Saddle Anesthesia: Denies    All other systems reviewed and negative.       PMHx:   Past Medical History:   Diagnosis Date   • Asthma     cough equivalent   • Dry eyes    • Frequent  "headaches    • Gingivitis    • History of hormone therapy    • Hyperlipidemia    • Knee pain    • Overweight    • Shoulder injury    • Sleep disorder          Current Outpatient Medications on File Prior to Visit   Medication Sig Dispense Refill   • Estradiol 0.025 MG/24HR PATCH BIWEEKLY Apply 1 Patch to skin as directed every 14 days. 8 Patch 3   • Plant Sterols and Stanols (CHOLESTOFF PO) Take  by mouth.     • estradiol (VAGIFEM) 10 MCG Tab Insert 1 Tab in vagina every day. 31 Tab 3   • fluticasone-salmeterol (ADVAIR DISKUS) 250-50 MCG/DOSE AEROSOL POWDER, BREATH ACTIVATED Inhale 1 puff by mouth 2 times a day . Rinse mouth well after use. (Note: this replaces Dulera)     • tretinoin (RETIN-A) 0.025 % cream Apply to affected area(s) daily at bedtime     • CALCIUM-MAGNESIUM PO Take  by mouth.       No current facility-administered medications on file prior to visit.         PSHx:   Past Surgical History:   Procedure Laterality Date   • EYE SURGERY      right cataract remobal/lens placement       Family history   Family History   Problem Relation Age of Onset   • Cancer Mother         breast         Medications: reviewed on epic.   Outpatient Medications Marked as Taking for the 8/5/19 encounter (Office Visit) with Ru Estrada M.D.   Medication Sig Dispense Refill   • Estradiol 0.025 MG/24HR PATCH BIWEEKLY Apply 1 Patch to skin as directed every 14 days. 8 Patch 3   • Plant Sterols and Stanols (CHOLESTOFF PO) Take  by mouth.     • estradiol (VAGIFEM) 10 MCG Tab Insert 1 Tab in vagina every day. 31 Tab 3   • fluticasone-salmeterol (ADVAIR DISKUS) 250-50 MCG/DOSE AEROSOL POWDER, BREATH ACTIVATED Inhale 1 puff by mouth 2 times a day . Rinse mouth well after use. (Note: this replaces Dulera)     • tretinoin (RETIN-A) 0.025 % cream Apply to affected area(s) daily at bedtime     • CALCIUM-MAGNESIUM PO Take  by mouth.          Allergies:   Allergies   Allergen Reactions   • Codeine      \"Makes me sick\"   • Penicillins  " "    Never taken but has family history of anaphylaxis death       Social Hx:   Social History     Socioeconomic History   • Marital status: Unknown     Spouse name: Not on file   • Number of children: Not on file   • Years of education: Not on file   • Highest education level: Not on file   Occupational History   • Not on file   Social Needs   • Financial resource strain: Not on file   • Food insecurity:     Worry: Not on file     Inability: Not on file   • Transportation needs:     Medical: Not on file     Non-medical: Not on file   Tobacco Use   • Smoking status: Never Smoker   • Smokeless tobacco: Never Used   Substance and Sexual Activity   • Alcohol use: Yes     Comment: 2-3 drinks a week    • Drug use: Yes     Comment: indica for sleep   • Sexual activity: Not Currently   Lifestyle   • Physical activity:     Days per week: Not on file     Minutes per session: Not on file   • Stress: Not on file   Relationships   • Social connections:     Talks on phone: Not on file     Gets together: Not on file     Attends [employer] service: Not on file     Active member of club or organization: Not on file     Attends meetings of clubs or organizations: Not on file     Relationship status: Not on file   • Intimate partner violence:     Fear of current or ex partner: Not on file     Emotionally abused: Not on file     Physically abused: Not on file     Forced sexual activity: Not on file   Other Topics Concern   • Not on file   Social History Narrative   • Not on file         EXAMINATION     Physical Exam:   Vitals: BP (!) 92/60 (BP Location: Left arm, Patient Position: Sitting, BP Cuff Size: Adult)   Pulse 72   Temp 36.2 °C (97.2 °F) (Temporal)   Ht 1.575 m (5' 2\")   Wt 69.9 kg (154 lb 1.6 oz)   SpO2 97%     Constitutional:   Body Habitus: Body mass index is 28.19 kg/m².  Cooperation: Fully cooperates with exam  Appearance: Well-groomed, well-nourished, not disheveled     Eyes: No scleral icterus to suggest severe liver " disease, no proptosis to suggest severe hyperthyroid    ENT -no obvious auditory deficits, no obvious tongue lesions, tongue midline, no facial droop     Skin -no rashes or lesions noted     Respiratory-  breathing comfortable on room air, no audible wheezing    Cardiovascular- capillary refills less than 2 seconds. No lower extremity edema is noted.     Gastrointestinal - no obvious abdominal masses     Psychiatric- alert and oriented ×3. Normal affect.     Gait - normal gait, no use of ambulatory device, nonantalgic.     Musculoskeletal -     right and left Knee:   Inspection no swelling, rashes or deformities,   Palpation no significant tenderness to palpation throughout the knee including the medial joint line, lateral joint line, quadriceps tendon, patellar tendon, patellar, medial patellar facets, lateral patellar facets, tibial tuberosity, fibular head.  Range of motion normal range of motion in flexion and extension  Special tests:   Varus stress tests: Negative  Valgus stress tests: Negative  Lockman's test: Negative  Anterior drawer: Negative  Posterior drawer: Negative  Nu's: negative      Cervical spine   Inspection: No deformities of the skin over the cervical spine. No rashes or lesions.    full  A/P ROM in all directions, with  pain  On the right    Spurling’s sign: negative bilaterally    No signs of muscular atrophy in bilateral upper extremities     Right mid and upper tenderness to palpation of the cervical facets      Neuro       Key points for the international standards for neurological classification of spinal cord injury (ISNCSCI) to light touch.     Dermatome R L   C4 2 2   C5 2 2   C6 2 2   C7 2 2   C8 2 2   T1 2 2   T2 2 2   L2 2 2   L3 2 2   L4 2 2   L5 2 2   S1 2 2   S2 2 2           Motor Exam Upper Extremities   ? Myotome R L   Shoulder flexion C5 5 5   Elbow flexion C5 5 5   Wrist extension C6 5 5   Elbow extension C7 5 5   Finger flexion C8 5 5   Finger abduction T1 5 5          Motor Exam Lower Extremities    ? Myotome R L   Hip flexion L2 5 5   Knee extension L3 5 5   Ankle dorsiflexion L4 5 5   Toe extension L5 5 5   Ankle plantarflexion S1 5 5         Aburto’s sign negative bilaterally   Babinski sign negative bilaterally   Clonus of the ankle negative bilaterally     Reflexes  ?  R L   Biceps  2+ 2+   Brachioradialis  2+ 2+   Patella  1+ 1+   Achilles   1+ 1+           MEDICAL DECISION MAKING    Medical records review: see under HPI section.     DATA    Labs:   Lab Results   Component Value Date/Time    SODIUM 139 05/28/2019 12:22 PM    POTASSIUM 3.8 05/28/2019 12:22 PM    CHLORIDE 106 05/28/2019 12:22 PM    CO2 24 05/28/2019 12:22 PM    ANION 9.0 05/28/2019 12:22 PM    GLUCOSE 83 05/28/2019 12:22 PM    BUN 14 05/28/2019 12:22 PM    CREATININE 0.71 05/28/2019 12:22 PM    CALCIUM 9.3 05/28/2019 12:22 PM    ASTSGOT 22 05/28/2019 12:22 PM    ALTSGPT 18 05/28/2019 12:22 PM    TBILIRUBIN 0.6 05/28/2019 12:22 PM    ALBUMIN 4.4 05/28/2019 12:22 PM    TOTPROTEIN 6.8 05/28/2019 12:22 PM    GLOBULIN 2.4 05/28/2019 12:22 PM    AGRATIO 1.8 05/28/2019 12:22 PM   ]    No results found for: PROTHROMBTM, INR     Lab Results   Component Value Date/Time    WBC 7.4 05/28/2019 12:22 PM    RBC 5.06 05/28/2019 12:22 PM    HEMOGLOBIN 14.7 05/28/2019 12:22 PM    HEMATOCRIT 44.4 05/28/2019 12:22 PM    MCV 87.7 05/28/2019 12:22 PM    MCH 29.1 05/28/2019 12:22 PM    MCHC 33.1 (L) 05/28/2019 12:22 PM    MPV 9.9 05/28/2019 12:22 PM    NEUTSPOLYS 59.30 05/28/2019 12:22 PM    LYMPHOCYTES 29.90 05/28/2019 12:22 PM    MONOCYTES 5.70 05/28/2019 12:22 PM    EOSINOPHILS 3.20 05/28/2019 12:22 PM    BASOPHILS 1.10 05/28/2019 12:22 PM        No results found for: HBA1C     Imaging:   I personally reviewed following images, these are my reads  MRI cervical spine 6/6/2019  Moderate right C3-4 neuroforaminal stenosis.  Moderate bilateral C5-6 neuroforaminal stenosis.  Facet arthropathy is present right worse than left  C3-4, C4-5, C5-6.    IMAGING radiology reads. I reviewed the following radiology reads                                   Results for orders placed during the hospital encounter of 06/06/19   MR-CERVICAL SPINE-W/O    Impression 1.  Multilevel degenerative disc disease, uncinate and facet degeneration. There are varying degrees of neural foraminal narrowing at levels as specifically described above. No significant central canal narrowing.    2.  Loss of the normal cervical lordosis.                                                                    Results for orders placed during the hospital encounter of 06/03/19   DX-CERVICAL SPINE-4+ VIEWS    Impression 1.  There is degenerative disease and arthropathy predominantly at the C5-6 and C6-7 levels.  2.  The alignment is normal. There is no abnormal motion.                                                                                       Diagnosis   Visit Diagnoses     ICD-10-CM   1. Spondylosis of cervical region without myelopathy or radiculopathy M47.812   2. Chronic pain of both knees M25.561    M25.562    G89.29           ASSESSMENT AND PLAN:  Audrey Aquino 69 y.o. female      Audrey was seen today for new patient.    Diagnoses and all orders for this visit:    Spondylosis of cervical region without myelopathy or radiculopathy  Comments:  Neurologically intact.  Chronic right facet arthropathy C3-4, C4-5, C5-6 consistent with imaging and exam.  Consider medial branch block if she fails PT  Orders:  -     REFERRAL TO PHYSICAL THERAPY Reason for Therapy: Eval/Treat/Report    Chronic pain of both knees  Comments:  I believe this is secondary to developing osteoarthritis versus patellofemoral syndrome with chronic patellar tendinosis  Orders:  -     REFERRAL TO PHYSICAL THERAPY Reason for Therapy: Eval/Treat/Report  -     DX-KNEE COMPLETE 4+ RIGHT; Future  -     DX-KNEE COMPLETE 4+ LEFT      I offered a medial branch block as the patient is failed conservative  treatments with medication management, home exercise program, rest, yoga.  We discussed doing physical therapy first.    8/5/2019 I performed a limited diagnostic ultrasound of the right knee.  Tiny suprapatellar effusion which may be physiologic.  Quadriceps tendon appears intact.  Tendinopathy of the patellar tendon with no signs of tearing.    Outside records requested:  The patient signed outside records request form for her outside records including outside images. Including the xrays from [place].       Follow-up: After the above diagnostic studies         Please note that this dictation was created using voice recognition software. I have made every reasonable attempt to correct obvious errors but there may be errors of grammar and content that I may have overlooked prior to finalization of this note.      Ru Estrada MD  Physical Medicine and Rehabilitation  Interventional Spine and Sports Physiatry  [facility]               Murtaza Garcia D.O.

## 2019-08-23 ENCOUNTER — HOSPITAL ENCOUNTER (OUTPATIENT)
Dept: RADIOLOGY | Facility: MEDICAL CENTER | Age: 69
End: 2019-08-23
Attending: PHYSICAL MEDICINE & REHABILITATION
Payer: MEDICARE

## 2019-08-23 DIAGNOSIS — G89.29 CHRONIC PAIN OF BOTH KNEES: ICD-10-CM

## 2019-08-23 DIAGNOSIS — M25.561 CHRONIC PAIN OF BOTH KNEES: ICD-10-CM

## 2019-08-23 DIAGNOSIS — M25.562 CHRONIC PAIN OF BOTH KNEES: ICD-10-CM

## 2019-08-23 PROCEDURE — 73562 X-RAY EXAM OF KNEE 3: CPT | Mod: RT

## 2019-08-23 PROCEDURE — 73565 X-RAY EXAM OF KNEES: CPT

## 2019-08-23 PROCEDURE — 73562 X-RAY EXAM OF KNEE 3: CPT | Mod: LT

## 2019-09-03 ENCOUNTER — TELEPHONE (OUTPATIENT)
Dept: MEDICAL GROUP | Facility: IMAGING CENTER | Age: 69
End: 2019-09-03

## 2019-09-03 DIAGNOSIS — F51.01 PRIMARY INSOMNIA: ICD-10-CM

## 2019-09-03 DIAGNOSIS — N95.2 POSTMENOPAUSE ATROPHIC VAGINITIS: ICD-10-CM

## 2019-09-03 NOTE — TELEPHONE ENCOUNTER
----- Message from Stephanie Chavez, Med Ass't sent at 8/30/2019 10:30 AM PDT -----  Regarding: rx refills  Pt is aware dr finn is leaving. Wants to talk about refills on all meds until the end of the year so she can find a new drJosefa Thanks

## 2019-09-03 NOTE — TELEPHONE ENCOUNTER
Spoke with patient and she is requesting 6 month refills on current medications so she has time to find a new pcp.

## 2019-09-04 ENCOUNTER — SLEEP CENTER VISIT (OUTPATIENT)
Dept: SLEEP MEDICINE | Facility: MEDICAL CENTER | Age: 69
End: 2019-09-04
Payer: MEDICARE

## 2019-09-04 VITALS
SYSTOLIC BLOOD PRESSURE: 102 MMHG | OXYGEN SATURATION: 95 % | DIASTOLIC BLOOD PRESSURE: 68 MMHG | RESPIRATION RATE: 16 BRPM | HEIGHT: 62 IN | HEART RATE: 60 BPM | BODY MASS INDEX: 28.34 KG/M2 | WEIGHT: 154 LBS

## 2019-09-04 DIAGNOSIS — G47.33 OBSTRUCTIVE SLEEP APNEA SYNDROME: ICD-10-CM

## 2019-09-04 PROCEDURE — 99204 OFFICE O/P NEW MOD 45 MIN: CPT | Performed by: INTERNAL MEDICINE

## 2019-09-04 RX ORDER — ESTRADIOL 10 UG/1
10 INSERT VAGINAL DAILY
Qty: 31 TAB | Refills: 3 | Status: SHIPPED | OUTPATIENT
Start: 2019-09-04 | End: 2019-09-18 | Stop reason: SDUPTHER

## 2019-09-04 RX ORDER — MV-MN/LUTEIN/ZEAX/BILBER/HB277 5-1-7.5 MG
CAPSULE ORAL
COMMUNITY

## 2019-09-04 RX ORDER — CRANBERRY FRUIT EXTRACT 200 MG
CAPSULE ORAL
COMMUNITY
End: 2020-05-07

## 2019-09-04 RX ORDER — VITAMIN B COMPLEX
TABLET ORAL
COMMUNITY

## 2019-09-04 RX ORDER — ESTRADIOL 0.03 MG/D
1 FILM, EXTENDED RELEASE TRANSDERMAL
Qty: 8 PATCH | Refills: 3 | Status: SHIPPED | OUTPATIENT
Start: 2019-09-04 | End: 2019-09-18 | Stop reason: SDUPTHER

## 2019-09-04 SDOH — HEALTH STABILITY: MENTAL HEALTH: HOW OFTEN DO YOU HAVE A DRINK CONTAINING ALCOHOL?: 2-3 TIMES A WEEK

## 2019-09-04 SDOH — HEALTH STABILITY: MENTAL HEALTH: HOW MANY STANDARD DRINKS CONTAINING ALCOHOL DO YOU HAVE ON A TYPICAL DAY?: 1 OR 2

## 2019-09-04 SDOH — HEALTH STABILITY: MENTAL HEALTH: HOW OFTEN DO YOU HAVE 6 OR MORE DRINKS ON ONE OCCASION?: NEVER

## 2019-09-04 NOTE — PROGRESS NOTES
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  She was diagnosed with moderate GÓMEZ through [facility] in [place] in 2014, AHI 11.7/h, and prescribed a mandibular advancement device.  Repeat home polysomnography using her appliance showed effective treatment of GÓMEZ.  She had replaced her oral appliance over the past year and has been noted to have breakthrough snoring with the device.  She admits she feels more tired and has less restorative sleep.  Her stressors have abated significantly since retiring from [employer].  She is attaining around 7 hours of sleep nightly.  She maintains consistent bedtimes around 1130, and falls asleep instantly.  She describes brief nighttime awakenings.  She gets up by 6:30 AM, feeling \"okay\".  Her [place] sleepiness score is 7.  Her BMI has been stable at 28.      Past Medical History:   Diagnosis Date   • Asthma     cough equivalent   • Dry eyes    • Frequent headaches    • Gingivitis    • History of hormone therapy    • Hyperlipidemia    • Knee pain    • Overweight    • Shoulder injury    • Sleep disorder        Social History     Socioeconomic History   • Marital status: Unknown     Spouse name: Not on file   • Number of children: Not on file   • Years of education: Not on file   • Highest education level: Not on file   Occupational History   • Not on file   Social Needs   • Financial resource strain: Not on file   • Food insecurity:     Worry: Not on file     Inability: Not on file   • Transportation needs:     Medical: Not on file     Non-medical: Not on file   Tobacco Use   • Smoking status: Never Smoker   • Smokeless tobacco: Never Used   Substance and Sexual Activity   • Alcohol use: Yes     Frequency: 2-3 times a week     Drinks per session: 1 or 2     Binge frequency: Never     Comment: 2-3 drinks a week    • Drug " use: Yes     Comment: indica tablets for sleep   • Sexual activity: Not Currently   Lifestyle   • Physical activity:     Days per week: Not on file     Minutes per session: Not on file   • Stress: Not on file   Relationships   • Social connections:     Talks on phone: Not on file     Gets together: Not on file     Attends Anglican service: Not on file     Active member of club or organization: Not on file     Attends meetings of clubs or organizations: Not on file     Relationship status: Not on file   • Intimate partner violence:     Fear of current or ex partner: Not on file     Emotionally abused: Not on file     Physically abused: Not on file     Forced sexual activity: Not on file   Other Topics Concern   • Not on file   Social History Narrative   • Not on file       Family History   Problem Relation Age of Onset   • Cancer Mother         breast       Current Outpatient Medications on File Prior to Visit   Medication Sig Dispense Refill   • Coenzyme Q10 (COQ10) 100 MG Cap Take  by mouth.     • Red Yeast Rice Extract ([employer] RED YEAST RICE) 600 MG Cap Take  by mouth.     • Multiple Vitamins-Minerals (DRY EYE FORMULA) Cap Take  by mouth.     • Estradiol 0.025 MG/24HR PATCH BIWEEKLY Apply 1 Patch to skin as directed every 14 days. 8 Patch 3   • Plant Sterols and Stanols (CHOLESTOFF PO) Take  by mouth.     • estradiol (VAGIFEM) 10 MCG Tab Insert 1 Tab in vagina every day. 31 Tab 3   • tretinoin (RETIN-A) 0.025 % cream Apply to affected area(s) daily at bedtime     • CALCIUM-MAGNESIUM PO Take  by mouth.       No current facility-administered medications on file prior to visit.        Allergies: Codeine and Penicillins    ROS:   Constitutional: Denies fevers, chills, night sweats, fatigue or weight loss  Eyes: Denies vision loss, pain, drainage, double vision  Ears, Nose, Throat: Denies earache, difficulty hearing, tinnitus, nasal congestion, hoarseness  Cardiovascular: Denies chest pain, tightness, palpitations,  "orthopnea or edema  Respiratory: Denies shortness of breath, cough, wheezing, hemoptysis  Sleep: As in HPI  GI: Denies heartburn, dysphagia, nausea, abdominal pain, diarrhea or constipation  : Denies frequent urination, hematuria, discharge or painful urination  Musculoskeletal: Denies back pain, painful joints, sore muscles  Neurological: Denies weakness or headaches  Skin: No rashes    /68 (BP Location: Left arm, Patient Position: Sitting, BP Cuff Size: Adult)   Pulse 60   Resp 16   Ht 1.575 m (5' 2\")   Wt 69.9 kg (154 lb)   SpO2 95%     Physical Exam:  Appearance: Well-nourished, well-developed, in no acute distress  HEENT: Normocephalic, atraumatic, white sclera, PERRLA, oropharynx clear, Mallampati 3  Neck: No adenopathy or masses  Respiratory: no intercostal retractions or accessory muscle use  Lungs auscultation: Clear to auscultation bilaterally  Cardiovascular: Regular rate rhythm. No murmurs, rubs or gallops.  No LE edema  Abdomen: soft, nondistended  Gait: Normal  Digits: No clubbing, cyanosis  Motor: No focal deficits  Orientation: Oriented to time, person and place    Diagnosis:  1. Obstructive sleep apnea syndrome  Overnight Home Sleep Study       Plan:  Dr. Aquino has obstructive sleep apnea, diagnosed 2014 through [facility].  She has been using an oral appliance for treatment with breakthrough snoring and nonrestorative sleep noted over the past 6 months.  She requires an updated polysomnogram with the use of her oral appliance-she would prefer to stick with a mandibular advancement device however is amenable to CPAP therapy if necessary.  Return for after sleep study.      "

## 2019-09-11 ENCOUNTER — HOSPITAL ENCOUNTER (OUTPATIENT)
Dept: RADIOLOGY | Facility: MEDICAL CENTER | Age: 69
End: 2019-09-11

## 2019-09-11 ENCOUNTER — HOSPITAL ENCOUNTER (OUTPATIENT)
Dept: LAB | Facility: MEDICAL CENTER | Age: 69
End: 2019-09-11
Attending: FAMILY MEDICINE
Payer: MEDICARE

## 2019-09-11 DIAGNOSIS — N95.2 POSTMENOPAUSE ATROPHIC VAGINITIS: ICD-10-CM

## 2019-09-11 DIAGNOSIS — E78.2 MIXED HYPERLIPIDEMIA: ICD-10-CM

## 2019-09-11 LAB
CHOLEST SERPL-MCNC: 188 MG/DL (ref 100–199)
DHEA-S SERPL-MCNC: 48.2 UG/DL (ref 9.4–246)
ESTRADIOL SERPL-MCNC: 33 PG/ML
FASTING STATUS PATIENT QL REPORTED: NORMAL
HDLC SERPL-MCNC: 36 MG/DL
LDLC SERPL CALC-MCNC: ABNORMAL MG/DL
TRIGL SERPL-MCNC: 526 MG/DL (ref 0–149)

## 2019-09-11 PROCEDURE — 82627 DEHYDROEPIANDROSTERONE: CPT

## 2019-09-11 PROCEDURE — 80061 LIPID PANEL: CPT

## 2019-09-11 PROCEDURE — 36415 COLL VENOUS BLD VENIPUNCTURE: CPT

## 2019-09-11 PROCEDURE — 82670 ASSAY OF TOTAL ESTRADIOL: CPT

## 2019-09-12 ENCOUNTER — HOME STUDY (OUTPATIENT)
Dept: SLEEP MEDICINE | Facility: MEDICAL CENTER | Age: 69
End: 2019-09-12
Payer: MEDICARE

## 2019-09-12 DIAGNOSIS — G47.33 OSA (OBSTRUCTIVE SLEEP APNEA): ICD-10-CM

## 2019-09-12 PROCEDURE — G0399 HOME SLEEP TEST/TYPE 3 PORTA: HCPCS | Performed by: FAMILY MEDICINE

## 2019-09-18 ENCOUNTER — OFFICE VISIT (OUTPATIENT)
Dept: MEDICAL GROUP | Facility: MEDICAL CENTER | Age: 69
End: 2019-09-18
Payer: MEDICARE

## 2019-09-18 VITALS
SYSTOLIC BLOOD PRESSURE: 100 MMHG | DIASTOLIC BLOOD PRESSURE: 82 MMHG | BODY MASS INDEX: 28.48 KG/M2 | RESPIRATION RATE: 16 BRPM | WEIGHT: 154.76 LBS | OXYGEN SATURATION: 98 % | TEMPERATURE: 97.4 F | HEIGHT: 62 IN | HEART RATE: 50 BPM

## 2019-09-18 DIAGNOSIS — N95.2 VAGINAL ATROPHY: ICD-10-CM

## 2019-09-18 DIAGNOSIS — E78.1 HYPERTRIGLYCERIDEMIA: ICD-10-CM

## 2019-09-18 DIAGNOSIS — Z00.00 HEALTHCARE MAINTENANCE: ICD-10-CM

## 2019-09-18 DIAGNOSIS — J45.991 COUGH VARIANT ASTHMA: ICD-10-CM

## 2019-09-18 DIAGNOSIS — N95.2 POSTMENOPAUSE ATROPHIC VAGINITIS: ICD-10-CM

## 2019-09-18 DIAGNOSIS — K05.10 GINGIVITIS: ICD-10-CM

## 2019-09-18 DIAGNOSIS — G47.33 OSA (OBSTRUCTIVE SLEEP APNEA): ICD-10-CM

## 2019-09-18 DIAGNOSIS — F51.01 PRIMARY INSOMNIA: ICD-10-CM

## 2019-09-18 PROCEDURE — 99204 OFFICE O/P NEW MOD 45 MIN: CPT | Performed by: FAMILY MEDICINE

## 2019-09-18 RX ORDER — ESTRADIOL 0.03 MG/D
1 FILM, EXTENDED RELEASE TRANSDERMAL
Qty: 8 PATCH | Refills: 3 | Status: SHIPPED | OUTPATIENT
Start: 2019-09-18 | End: 2019-09-20 | Stop reason: SDUPTHER

## 2019-09-18 RX ORDER — FENOFIBRATE 145 MG/1
145 TABLET, COATED ORAL DAILY
Qty: 90 TAB | Refills: 2 | Status: SHIPPED | OUTPATIENT
Start: 2019-09-18 | End: 2019-09-22 | Stop reason: SDUPTHER

## 2019-09-18 RX ORDER — FENOFIBRATE 145 MG/1
145 TABLET, COATED ORAL DAILY
Qty: 90 TAB | Refills: 2 | Status: SHIPPED | OUTPATIENT
Start: 2019-09-18 | End: 2019-09-18 | Stop reason: SDUPTHER

## 2019-09-18 RX ORDER — ZOLPIDEM TARTRATE 5 MG/1
5 TABLET ORAL NIGHTLY PRN
COMMUNITY
End: 2021-03-29

## 2019-09-18 RX ORDER — ESTRADIOL 10 UG/1
10 INSERT VAGINAL DAILY
Qty: 31 TAB | Refills: 3 | Status: SHIPPED | OUTPATIENT
Start: 2019-09-18 | End: 2019-09-20 | Stop reason: SDUPTHER

## 2019-09-18 NOTE — PROGRESS NOTES
CC: New patient: Hypertriglyceridemia, cough variant asthma, gingivitis, vaginal atrophy    HPI:  Audrey presents today to establish new PCP.  She is a retired OB/GYN doctor, she retired 4 years ago.    Has been active and independent with all ADLs.  Has the following chronic medical issues:    Hypertriglyceridemia  Chronic.  Patient has chronic hypertriglyceridemia has been trying red yeast rice extract and diet, but it has not been working or most recent lipid panel showed that her triglycerides has worsened from 476 to 526.     Ref Range & Units 7d ago 3mo ago   Cholesterol,Tot 100 - 199 mg/dL 188  191 R   Triglycerides 0 - 149 mg/dL 526High   476High  R   HDL >=40 mg/dL 36Abnormal   33Low  R   LDL <100 mg/dL see below       However she does not have any history of diabetes, CAD or stroke.      Cough variant asthma  Patient develops cough once in a while always albuterol has been her..  But she does not have a history of asthma and she was diagnosed with asthma before.      Gingivitis  As per patient, she has been having chronic conjunctivitis, she stated that the transient family.  She has been very meticulous about mouth hygiene but she is still getting it.  She has been following up with her dentist for recommendation.      Obstructive sleep apnea  Was diagnosed with associated obstructive sleep apnea many years ago however she has been using CPAP, she had a recent sleep study as an appointment with the pulmonologist for more recommendations.    Vaginal atrophy  Patient has been on estradiol patches biweekly.  Has been helping.    Pneumonia shot is up-to-date  Will take flu shot next month.  Patient Active Problem List    Diagnosis Date Noted   • Gingivitis 09/18/2019   • Foraminal stenosis of cervical region 06/03/2019   • Trigger ring finger of right hand 12/13/2018   • Cough variant asthma 10/16/2018   • Intractable migraine without aura and without status migrainosus 10/16/2018   • Primary insomnia  10/16/2018   • Bilateral patellofemoral syndrome 10/16/2018   • Hx of total hysterectomy 10/16/2018   • Vaginal atrophy 10/16/2018   • Obstructive sleep apnea syndrome 09/12/2014   • History of radial keratotomy 12/08/2010   • Acquired absence of other genital organ(s) 12/22/2008   • Mixed hyperlipidemia 09/21/2007   • Diverticulosis of colon 10/20/2006   • Allergic rhinitis 01/15/2003       Current Outpatient Medications   Medication Sig Dispense Refill   • zolpidem (AMBIEN) 5 MG Tab Take 5 mg by mouth at bedtime as needed for Sleep.     • fenofibrate (TRICOR) 145 MG Tab Take 1 Tab by mouth every day. 90 Tab 2   • Coenzyme Q10 (COQ10) 100 MG Cap Take  by mouth.     • Multiple Vitamins-Minerals (DRY EYE FORMULA) Cap Take  by mouth.     • estradiol (VAGIFEM) 10 MCG Tab Insert 1 Tab in vagina every day. 31 Tab 3   • Estradiol 0.025 MG/24HR PATCH BIWEEKLY Apply 1 Patch to skin as directed every 14 days. 8 Patch 3   • Plant Sterols and Stanols (CHOLESTOFF PO) Take  by mouth.     • tretinoin (RETIN-A) 0.025 % cream Apply to affected area(s) daily at bedtime     • CALCIUM-MAGNESIUM PO Take  by mouth.     • Red Yeast Rice Extract ([employer]S RED YEAST RICE) 600 MG Cap Take  by mouth.       No current facility-administered medications for this visit.          Allergies as of 09/18/2019 - Reviewed 09/18/2019   Allergen Reaction Noted   • Codeine  10/16/2018   • Penicillins  10/16/2018        Social History     Socioeconomic History   • Marital status: Unknown     Spouse name: Not on file   • Number of children: Not on file   • Years of education: Not on file   • Highest education level: Not on file   Occupational History   • Not on file   Social Needs   • Financial resource strain: Not on file   • Food insecurity:     Worry: Not on file     Inability: Not on file   • Transportation needs:     Medical: Not on file     Non-medical: Not on file   Tobacco Use   • Smoking status: Never Smoker   • Smokeless tobacco: Never Used  "  Substance and Sexual Activity   • Alcohol use: Yes     Frequency: 2-3 times a week     Drinks per session: 1 or 2     Binge frequency: Never     Comment: 2-3 drinks a week    • Drug use: Yes     Comment: indica tablets for sleep   • Sexual activity: Not Currently   Lifestyle   • Physical activity:     Days per week: Not on file     Minutes per session: Not on file   • Stress: Not on file   Relationships   • Social connections:     Talks on phone: Not on file     Gets together: Not on file     Attends Mosque service: Not on file     Active member of club or organization: Not on file     Attends meetings of clubs or organizations: Not on file     Relationship status: Not on file   • Intimate partner violence:     Fear of current or ex partner: Not on file     Emotionally abused: Not on file     Physically abused: Not on file     Forced sexual activity: Not on file   Other Topics Concern   • Not on file   Social History Narrative   • Not on file       Family History   Problem Relation Age of Onset   • Cancer Mother         breast       Past Surgical History:   Procedure Laterality Date   • EYE SURGERY      right cataract remobal/lens placement       ROS:  Denies any Headache, Blurred Vision, Confusion Chest pain,  Shortness of breath,  Abdominal pain, Changes of bowel or bladder, Lower ext edema, Fevers, Nights sweats, Weight Changes, Focal weakness or numbness.  All other systems are negative.    /82 (BP Location: Right arm, Patient Position: Sitting, BP Cuff Size: Adult)   Pulse (!) 50   Temp 36.3 °C (97.4 °F)   Resp 16   Ht 1.575 m (5' 2\")   Wt 70.2 kg (154 lb 12.2 oz)   SpO2 98%   BMI 28.31 kg/m²     Physical Exam:  Gen:         Alert and oriented, No apparent distress.  HEENT:   Perrla, TM clear,  Oralpharynx no erythema or exudates.  Neck:       No Jugular venous distension, Lymphadenopathy, Thyromegaly, Bruits.  Lungs:     Clear to auscultation bilaterally  CV:          Regular rate and rhythm. " No murmurs, rubs or gallops.  Abd:         Soft non tender, non distended. Normal active bowel sounds. No                                        Hepatosplenomegaly, No pulsatile masses.  Ext:          No clubbing, cyanosis, edema.      Assessment and Plan.   69 y.o. female     1. Hypertriglyceridemia  Chronic.  Last lipid panel showed that triglycerides has worsened:   Ref Range & Units 7d ago 3mo ago   Cholesterol,Tot 100 - 199 mg/dL 188  191 R   Triglycerides 0 - 149 mg/dL 526High   476High  R   HDL >=40 mg/dL 36Abnormal   33Low  R   LDL <100 mg/dL see below       We will start patient on fenofibrate 145 mg daily.  We will repeat lipid panel in 3 months.    - fenofibrate (TRICOR) 145 MG Tab; Take 1 Tab by mouth every day.  Dispense: 90 Tab; Refill: 2  - Lipid Profile; Future    2. Cough variant asthma  Currently stable.  Takes albuterol once in a while.    3. Gingivitis  Chronic issue.  Has been following up with the dentist.    4. Vaginal atrophy  Patient has been on estradiol patches biweekly.  Has been helping.    5. Obstructive sleep apnea  Was diagnosed with associated obstructive sleep apnea many years ago however she has been using CPAP, she had a recent sleep study as an appointment with the pulmonologist for more recommendations.    5. Healthcare maintenance  Pneumonia shot is up-to-date  Will take flu shot next month.

## 2019-09-19 ENCOUNTER — SLEEP CENTER VISIT (OUTPATIENT)
Dept: SLEEP MEDICINE | Facility: MEDICAL CENTER | Age: 69
End: 2019-09-19
Payer: MEDICARE

## 2019-09-19 VITALS
DIASTOLIC BLOOD PRESSURE: 72 MMHG | OXYGEN SATURATION: 97 % | SYSTOLIC BLOOD PRESSURE: 124 MMHG | HEART RATE: 60 BPM | HEIGHT: 62 IN | BODY MASS INDEX: 28.31 KG/M2

## 2019-09-19 DIAGNOSIS — G47.33 OSA (OBSTRUCTIVE SLEEP APNEA): ICD-10-CM

## 2019-09-19 PROCEDURE — 99214 OFFICE O/P EST MOD 30 MIN: CPT | Performed by: NURSE PRACTITIONER

## 2019-09-19 NOTE — PATIENT INSTRUCTIONS
1) Continue mandibular device  2) referral to dentist for adjustment of mandibular device  3) home sleep study after adjustment of mandibular device  4) Vaccines: Up to date with Prevnar 13, Pneumovax 23  5) Return in about 4 months (around 1/19/2020) for sleep study results, if not sooner.

## 2019-09-19 NOTE — PROGRESS NOTES
CC:  Here for f/u sleep issues as listed below    HPI:   Audrey presents today for follow up obstructive sleep apnea.  Past medical history of asthma, insomnia, hyperlipidemia, migraine.    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  She was originally diagnosed at [facility] in 2014 with an AHI of 11.7.  Since then she has been prescribed a mandibular device for treatment.  She has had repeat sleep studies to show effectiveness of her device.  Over the last year she required an updated mandibular device and has not been sleeping as well feeling more tired, not feeling as refreshed, and snoring again.  Her weight is up a little bit.  For reevaluation a HST with her mandibular device was completed 9/2019 showed an TELLY of 5.8 and low oxygenation of 82%.      Patient Active Problem List    Diagnosis Date Noted   • Gingivitis 09/18/2019   • Foraminal stenosis of cervical region 06/03/2019   • Trigger ring finger of right hand 12/13/2018   • Cough variant asthma 10/16/2018   • Intractable migraine without aura and without status migrainosus 10/16/2018   • Primary insomnia 10/16/2018   • Bilateral patellofemoral syndrome 10/16/2018   • Hx of total hysterectomy 10/16/2018   • Vaginal atrophy 10/16/2018   • Obstructive sleep apnea syndrome 09/12/2014   • History of radial keratotomy 12/08/2010   • Acquired absence of other genital organ(s) 12/22/2008   • Mixed hyperlipidemia 09/21/2007   • Diverticulosis of colon 10/20/2006   • Allergic rhinitis 01/15/2003       Past Medical History:   Diagnosis Date   • Asthma     cough equivalent   • Dry eyes    • Frequent headaches    • Gingivitis    • History of hormone therapy    • Hyperlipidemia    • Knee pain    • Overweight    • Shoulder injury    • Sleep disorder        Past Surgical History:   Procedure Laterality Date   • EYE SURGERY      right cataract remobal/lens placement       Family History   Problem Relation Age of Onset   • Cancer  Mother         breast       Social History     Socioeconomic History   • Marital status: Unknown     Spouse name: Not on file   • Number of children: Not on file   • Years of education: Not on file   • Highest education level: Not on file   Occupational History   • Not on file   Social Needs   • Financial resource strain: Not on file   • Food insecurity:     Worry: Not on file     Inability: Not on file   • Transportation needs:     Medical: Not on file     Non-medical: Not on file   Tobacco Use   • Smoking status: Never Smoker   • Smokeless tobacco: Never Used   Substance and Sexual Activity   • Alcohol use: Yes     Frequency: 2-3 times a week     Drinks per session: 1 or 2     Binge frequency: Never     Comment: 2-3 drinks a week    • Drug use: Yes     Comment: indica tablets for sleep   • Sexual activity: Not Currently   Lifestyle   • Physical activity:     Days per week: Not on file     Minutes per session: Not on file   • Stress: Not on file   Relationships   • Social connections:     Talks on phone: Not on file     Gets together: Not on file     Attends Tenriism service: Not on file     Active member of club or organization: Not on file     Attends meetings of clubs or organizations: Not on file     Relationship status: Not on file   • Intimate partner violence:     Fear of current or ex partner: Not on file     Emotionally abused: Not on file     Physically abused: Not on file     Forced sexual activity: Not on file   Other Topics Concern   • Not on file   Social History Narrative   • Not on file       Current Outpatient Medications   Medication Sig Dispense Refill   • Coenzyme Q10 (COQ10) 100 MG Cap Take  by mouth.     • CALCIUM-MAGNESIUM PO Take  by mouth.     • zolpidem (AMBIEN) 5 MG Tab Take 5 mg by mouth at bedtime as needed for Sleep.     • fenofibrate (TRICOR) 145 MG Tab Take 1 Tab by mouth every day. 90 Tab 2   • estradiol (VAGIFEM) 10 MCG Tab Insert 1 Tab in vagina every day. 31 Tab 3   • Estradiol  "0.025 MG/24HR PATCH BIWEEKLY Apply 1 Patch to skin as directed every 14 days. 8 Patch 3   • Red Yeast Rice Extract ([employer]S RED YEAST RICE) 600 MG Cap Take  by mouth.     • Multiple Vitamins-Minerals (DRY EYE FORMULA) Cap Take  by mouth.     • Plant Sterols and Stanols (CHOLESTOFF PO) Take  by mouth.     • tretinoin (RETIN-A) 0.025 % cream Apply to affected area(s) daily at bedtime       No current facility-administered medications for this visit.           Allergies: Codeine and Penicillins      ROS   Gen: Denies fever, chills, unintentional weight loss,  Resp:Denies Dyspnea  CV: Denies chest pain, chest tightness  Sleep:Denies morning headache, insomnia, gasping for air, apnea  Neuro: Denies frequent headaches, weakness, dizziness  See HPI.  All other systems reviewed and negative        Vital signs for this encounter:  Vitals:    09/19/19 1347   Height: 1.575 m (5' 2\")   BP: 124/72   Pulse: 60                   Physical Exam:   Gen:         Alert and oriented, No apparent distress.   Neck:        No Lymphadenopathy.  Lungs:     Clear to auscultation bilaterally.    CV:          Regular rate and rhythm. No murmurs, rubs or gallops.   Abd:         Soft non tender, non distended.            Ext:          No clubbing, cyanosis, edema.    Assessment   1. GÓMEZ (obstructive sleep apnea)  REFERRAL TO OTHER    Overnight Home Sleep Study       Patient is clinically stable and will proceed with following plan.  Face-to-face time greater than 50% of 25 minutes reviewing questions and concerns regarding her sleep apnea. reviewed possible treatment options including CPAP machine which she is amenable to given the updated masks over the last few years that she noted.  She would like to be referred to a dentist in [facility] to see if her mandibular device can be adjusted and then complete another sleep study to determine its effectiveness.    PLAN:   Patient Instructions   1) Continue mandibular device  2) referral to dentist for " adjustment of mandibular device  3) home sleep study after adjustment of mandibular device  4) Vaccines: Up to date with Prevnar 13, Pneumovax 23  5) Return in about 4 months (around 1/19/2020) for sleep study results.

## 2019-09-20 ENCOUNTER — APPOINTMENT (OUTPATIENT)
Dept: DERMATOLOGY | Facility: IMAGING CENTER | Age: 69
End: 2019-09-20

## 2019-09-20 ENCOUNTER — TELEPHONE (OUTPATIENT)
Dept: MEDICAL GROUP | Facility: IMAGING CENTER | Age: 69
End: 2019-09-20

## 2019-09-20 DIAGNOSIS — E78.1 HYPERTRIGLYCERIDEMIA: ICD-10-CM

## 2019-09-20 DIAGNOSIS — N95.2 POSTMENOPAUSE ATROPHIC VAGINITIS: ICD-10-CM

## 2019-09-20 DIAGNOSIS — F51.01 PRIMARY INSOMNIA: ICD-10-CM

## 2019-09-20 NOTE — TELEPHONE ENCOUNTER
Received voicemail from [facility] pharmacy regarding patients prescription for Estradiol patches. They would like to clarify directions. Prescription states to use one patch twice weekly but normally they are used twice weekly.     Called and spoke with patient to verify prescription directions. Patient states she uses one patch twice weekly.   Notified pharmacy.

## 2019-09-22 RX ORDER — FENOFIBRATE 145 MG/1
145 TABLET, COATED ORAL DAILY
Qty: 90 TAB | Refills: 2 | Status: SHIPPED | OUTPATIENT
Start: 2019-09-22 | End: 2020-05-29 | Stop reason: SDUPTHER

## 2019-09-22 RX ORDER — ESTRADIOL 0.03 MG/D
1 FILM, EXTENDED RELEASE TRANSDERMAL
Qty: 8 PATCH | Refills: 3 | Status: SHIPPED | OUTPATIENT
Start: 2019-09-22 | End: 2019-09-24 | Stop reason: SDUPTHER

## 2019-09-22 RX ORDER — ESTRADIOL 10 UG/1
10 INSERT VAGINAL DAILY
Qty: 31 TAB | Refills: 3 | Status: SHIPPED | OUTPATIENT
Start: 2019-09-22 | End: 2020-01-20

## 2019-09-22 NOTE — PROCEDURES
DIAGNOSTIC HOME SLEEP TEST (HST) REPORT       PATIENT ID:  NAME:  Audrey Aquino  MRN:               [id number]  YOB: 1950  DATE OF STUDY: 9/12/19      Impression:     This study shows evidence of:     1.Mild obstructive sleep apnea with  Respiratory Event Index (TELLY) of 5.8 per hour . These findings are based on the recording time (flow evaluation time). It is not possible with this device to determine a traditional apnea+hypopnea index (AHI) for total sleep time since EEG channels are not available.     2.  O2 Sat. da was 82%, avg O2 was 91 % and baseline O2 at 96 %. O2 sat was below 89% for 6.9 min of the flow evaluation time. Avg HR 51 BPM.      TECHNICAL DESCRIPTION:  Coridea Device used was a type-III home study device. Home sleep study recording included: Airflow recording by nasal pressure transducer; Respiratory Effort by abdominal Respiratory Bands; O2 by finger oximetry. A position sensor and a snore channel was also used.    Scoring Criteria: A modification of the the AASM Manual for the Scoring of Sleep and Associated Events, 2012, was used.   Obstructive apnea was scored by cessation of airflow for at least 10 seconds with continuing respiratory effort.  Central apnea was scored by cessation of airflow for at least 10 seconds with no effort.  Hypopnea was scored by a 30% or more reduction in airflow for at least 10 seconds accompanied by an arterial oxygen desaturation of 3% or more.  (For Medicare patients, hypopneas were scored by a 30% or more reduction in airflow for at least 10 seconds accompanied by an arterial oxygen saturation of 4% or more, as required by their insurance, CMS.        General sleep summary: . Total recording time is 418 minutes and total flow evaluation time is 414 minutes. The patient spent 0.7 minutes in the supine position    Respiratory events:    Apneas: 8 (Obstructive apnea index 1.2/hr, Central apnea index 0 /hr, mixed 0 /hour)  Hypopneas:  32    Recommendations:    1. CPAP titration study vs Auto CPAP trial .   2.   In general patients with sleep apnea are advised to avoid alcohol and sedatives and to not operate a motor vehicle while drowsy. In some cases alternative treatment options may prove effective in resolving sleep apnea in these options include upper airway surgery, the use of a dental orthotic or weight loss and positional therapy. Clinical correlation is required.         Rambo Rea MD

## 2019-09-23 ENCOUNTER — TELEPHONE (OUTPATIENT)
Dept: MEDICAL GROUP | Facility: IMAGING CENTER | Age: 69
End: 2019-09-23

## 2019-09-23 ENCOUNTER — PATIENT MESSAGE (OUTPATIENT)
Dept: SLEEP MEDICINE | Facility: MEDICAL CENTER | Age: 69
End: 2019-09-23

## 2019-09-23 NOTE — TELEPHONE ENCOUNTER
----- Message from Ijeoma Batres sent at 9/23/2019  8:50 AM PDT -----  Regarding: ESTRADIOL CLARIFICATION  Contact: [phone]  Sophia called asking for a call back for clarification on an Estradiol Rx for Pt. Please call Sophia at [phone]

## 2019-09-23 NOTE — PATIENT COMMUNICATION
PLAN:   Patient Instructions   1) Continue mandibular device  2) referral to dentist for adjustment of mandibular device  3) home sleep study after adjustment of mandibular device  4) Vaccines: Up to date with Prevnar 13, Pneumovax 23  5) Return in about 4 months (around 1/19/2020) for sleep study results.

## 2019-09-23 NOTE — TELEPHONE ENCOUNTER
Called and spoke with pharmacist, she wanted to verify patient is using estradiol vaginal insert along with estradiol patch. No other questions at this time.

## 2019-09-23 NOTE — TELEPHONE ENCOUNTER
From: Audrey Aquino  To: THAIS Mc  Sent: 9/23/2019 10:12 AM PDT  Subject: Test Result Question    Hi Ana,  I have been thinking about since our last visit.  I do not think that the dental appliance can be moved out further without causing jaw discomfort. I tried it in the past and I have the adjustment device.   I have decided to go with the CPAP machine instead. I believe that I do not require prior authorization from medicare, is that correct? I also have [employer] as a secondary insurance, they pay after Medicare pays - do we need to get authroization from them too or does the CPAP DME provider accept medicare as full payment? Do you know whether there is a portion I would have to pay?  I am happy to start with the machine self-adjusting, but I would like to schedule a sleep study at your facility to fine tune. Thank you so much.

## 2019-09-24 ENCOUNTER — PATIENT MESSAGE (OUTPATIENT)
Dept: SLEEP MEDICINE | Facility: MEDICAL CENTER | Age: 69
End: 2019-09-24

## 2019-09-24 DIAGNOSIS — F51.01 PRIMARY INSOMNIA: ICD-10-CM

## 2019-09-24 DIAGNOSIS — N95.2 POSTMENOPAUSE ATROPHIC VAGINITIS: ICD-10-CM

## 2019-09-24 RX ORDER — ESTRADIOL 0.03 MG/D
1 FILM, EXTENDED RELEASE TRANSDERMAL
Qty: 8 PATCH | Refills: 3 | Status: SHIPPED | OUTPATIENT
Start: 2019-09-24 | End: 2019-10-15 | Stop reason: SDUPTHER

## 2019-09-25 ENCOUNTER — PATIENT MESSAGE (OUTPATIENT)
Dept: MEDICAL GROUP | Facility: MEDICAL CENTER | Age: 69
End: 2019-09-25

## 2019-09-25 DIAGNOSIS — G47.33 OSA (OBSTRUCTIVE SLEEP APNEA): ICD-10-CM

## 2019-09-25 NOTE — PATIENT COMMUNICATION
Faxed OV, Orders, ID/Insurance to DME:  Luca / tobi [phone] / jennifer [phone].  Fax Confirmed 09/25/2019  EFM    Called patient and informed her of [facility] phone number.

## 2019-10-02 ENCOUNTER — PATIENT MESSAGE (OUTPATIENT)
Dept: SLEEP MEDICINE | Facility: MEDICAL CENTER | Age: 69
End: 2019-10-02

## 2019-10-07 NOTE — TELEPHONE ENCOUNTER
"From: Audrey Aquino  To: Mary Fields, Med Ass't  Sent: 10/2/2019 2:46 PM PDT  Subject: Procedure Question    I called them and they said that they sent you the final paperwork on 10/2/19 and are waiting for it to be returned to them. So, I am confused... please help.  Audrey Aquino  ----- Message -----  From: Mary Fields, Med Ass't  Sent: 10/2/2019 10:49 AM PDT  To: Audrey Aquino  Subject: RE: Procedure Question  [facility]   If they have not already reached out to you, their phone number is [phone]    ----- Message -----   From: Audrey Aquino   Sent: 9/24/2019 5:50 PM PDT   To: THAIS Mc  Subject: Procedure Question    Troy Perez,  I thought I replied to your last email.  I did not understand this sentence: \"Which he like to start on the auto machine urge you want to complete a sleep study in our lab.\"  Do you recommend me obtaining the adjusting machine or sleeping in the lab to find the optimal setting?  I will do whatever you recommend,but I would like to get started with the CPAP as soon as we can.  Please send me to a provider that accepts medicare so I can contact them about costs.  Audrey Aquino    "

## 2019-10-08 ENCOUNTER — PATIENT MESSAGE (OUTPATIENT)
Dept: MEDICAL GROUP | Facility: MEDICAL CENTER | Age: 69
End: 2019-10-08

## 2019-10-15 DIAGNOSIS — N95.2 POSTMENOPAUSE ATROPHIC VAGINITIS: ICD-10-CM

## 2019-10-15 DIAGNOSIS — F51.01 PRIMARY INSOMNIA: ICD-10-CM

## 2019-10-15 RX ORDER — ESTRADIOL 0.03 MG/D
FILM, EXTENDED RELEASE TRANSDERMAL
Qty: 24 PATCH | Refills: 3 | Status: SHIPPED | OUTPATIENT
Start: 2019-10-15 | End: 2020-11-25

## 2019-11-13 ENCOUNTER — APPOINTMENT (OUTPATIENT)
Dept: DERMATOLOGY | Facility: IMAGING CENTER | Age: 69
End: 2019-11-13

## 2019-11-18 ENCOUNTER — OFFICE VISIT (OUTPATIENT)
Dept: PHYSICAL MEDICINE AND REHAB | Facility: MEDICAL CENTER | Age: 69
End: 2019-11-18
Payer: MEDICARE

## 2019-11-18 VITALS
BODY MASS INDEX: 28.52 KG/M2 | HEART RATE: 56 BPM | WEIGHT: 155 LBS | OXYGEN SATURATION: 98 % | SYSTOLIC BLOOD PRESSURE: 108 MMHG | TEMPERATURE: 97 F | DIASTOLIC BLOOD PRESSURE: 62 MMHG | HEIGHT: 62 IN

## 2019-11-18 DIAGNOSIS — M25.562 CHRONIC PAIN OF BOTH KNEES: ICD-10-CM

## 2019-11-18 DIAGNOSIS — M47.812 SPONDYLOSIS OF CERVICAL REGION WITHOUT MYELOPATHY OR RADICULOPATHY: ICD-10-CM

## 2019-11-18 DIAGNOSIS — G89.29 CHRONIC PAIN OF BOTH KNEES: ICD-10-CM

## 2019-11-18 DIAGNOSIS — M25.561 CHRONIC PAIN OF BOTH KNEES: ICD-10-CM

## 2019-11-18 DIAGNOSIS — M17.0 PRIMARY OSTEOARTHRITIS OF BOTH KNEES: ICD-10-CM

## 2019-11-18 PROCEDURE — 99213 OFFICE O/P EST LOW 20 MIN: CPT | Performed by: PHYSICAL MEDICINE & REHABILITATION

## 2019-11-18 ASSESSMENT — PAIN SCALES - GENERAL: PAINLEVEL: 3=SLIGHT PAIN

## 2019-11-18 ASSESSMENT — PATIENT HEALTH QUESTIONNAIRE - PHQ9
8. MOVING OR SPEAKING SO SLOWLY THAT OTHER PEOPLE COULD HAVE NOTICED. OR THE OPPOSITE, BEING SO FIGETY OR RESTLESS THAT YOU HAVE BEEN MOVING AROUND A LOT MORE THAN USUAL: 0
SUM OF ALL RESPONSES TO PHQ9 QUESTIONS 1 AND 2: 0
4. FEELING TIRED OR HAVING LITTLE ENERGY: 0
1. LITTLE INTEREST OR PLEASURE IN DOING THINGS: 0
2. FEELING DOWN, DEPRESSED, IRRITABLE, OR HOPELESS: 0
7. TROUBLE CONCENTRATING ON THINGS, SUCH AS READING THE NEWSPAPER OR WATCHING TELEVISION: 0
SUM OF ALL RESPONSES TO PHQ QUESTIONS 1-9: 2
6. FEELING BAD ABOUT YOURSELF - OR THAT YOU ARE A FAILURE OR HAVE LET YOURSELF OR YOUR FAMILY DOWN: 0
9. THOUGHTS THAT YOU WOULD BE BETTER OFF DEAD, OR OF HURTING YOURSELF: 0
5. POOR APPETITE OR OVEREATING: 2
3. TROUBLE FALLING OR STAYING ASLEEP OR SLEEPING TOO MUCH: 0

## 2019-11-18 NOTE — PROGRESS NOTES
Follow up patient note  Interventional spine and sports physiatry, Physical medicine rehabilitation      Chief complaint:   Chief Complaint   Patient presents with   • Follow-Up     Neck pain         HISTORY    Please see new patient note by Dr Estrada,  for more details.     HPI  Patient identification: Audrey Aquino 69 y.o. female  With Diagnoses of Spondylosis of cervical region without myelopathy or radiculopathy, Chronic pain of both knees, and Primary osteoarthritis of both knees were pertinent to this visit.       Patient has been going to physical therapy with sports performance physical therapy and has significant improvement in both her neck pain and her bilateral knee pains.  She was able to go on a short hike with no issue in the knees.    Bilateral knee pain is aching and stabbing in quality, nonradiating, worse in the medial aspect right knee worse than left, intermittent, worse with hiking, 2-3/10 in intensity.  Improved with physical therapy with improvement in alignment, stretching and exercise program.    Regards the patient's neck pain this is significantly improved with physical therapy with a stretching and strengthening program.  Aching quality pain in the right side of the neck, 2-3/10 in intensity, nonradiating, worse with neck movements.  Not interrupting function currently.       ROS Red Flags :   Fever, Chills, Sweats: Denies  Involuntary Weight Loss: Denies  Bowel/Bladder Incontinence: Denies  Saddle Anesthesia: Denies        PMHx:   Past Medical History:   Diagnosis Date   • Asthma     cough equivalent   • Dry eyes    • Frequent headaches    • Gingivitis    • History of hormone therapy    • Hyperlipidemia    • Knee pain    • Overweight    • Shoulder injury    • Sleep disorder        PSHx:   Past Surgical History:   Procedure Laterality Date   • EYE SURGERY      right cataract remobal/lens placement       Family history   Family History   Problem Relation Age of Onset   • Cancer Mother      "    breast         Medications:   Outpatient Medications Marked as Taking for the 11/18/19 encounter (Office Visit) with Ru Estrada M.D.   Medication Sig Dispense Refill   • Estradiol 0.025 MG/24HR PATCH BIWEEKLY APPLY 1 PATCH TWICE WEEKLY AS DIRECTED 24 Patch 3   • fenofibrate (TRICOR) 145 MG Tab Take 1 Tab by mouth every day. 90 Tab 2   • estradiol (VAGIFEM) 10 MCG Tab Insert 1 Tab in vagina every day. 31 Tab 3   • zolpidem (AMBIEN) 5 MG Tab Take 5 mg by mouth at bedtime as needed for Sleep.     • Coenzyme Q10 (COQ10) 100 MG Cap Take  by mouth.     • Multiple Vitamins-Minerals (DRY EYE FORMULA) Cap Take  by mouth.     • tretinoin (RETIN-A) 0.025 % cream Apply to affected area(s) daily at bedtime     • CALCIUM-MAGNESIUM PO Take  by mouth.          Current Outpatient Medications on File Prior to Visit   Medication Sig Dispense Refill   • Estradiol 0.025 MG/24HR PATCH BIWEEKLY APPLY 1 PATCH TWICE WEEKLY AS DIRECTED 24 Patch 3   • fenofibrate (TRICOR) 145 MG Tab Take 1 Tab by mouth every day. 90 Tab 2   • estradiol (VAGIFEM) 10 MCG Tab Insert 1 Tab in vagina every day. 31 Tab 3   • zolpidem (AMBIEN) 5 MG Tab Take 5 mg by mouth at bedtime as needed for Sleep.     • Coenzyme Q10 (COQ10) 100 MG Cap Take  by mouth.     • Multiple Vitamins-Minerals (DRY EYE FORMULA) Cap Take  by mouth.     • tretinoin (RETIN-A) 0.025 % cream Apply to affected area(s) daily at bedtime     • CALCIUM-MAGNESIUM PO Take  by mouth.     • Red Yeast Rice Extract ([employer] RED YEAST RICE) 600 MG Cap Take  by mouth.     • Plant Sterols and Stanols (CHOLESTOFF PO) Take  by mouth.       No current facility-administered medications on file prior to visit.          Allergies:   Allergies   Allergen Reactions   • Codeine      \"Makes me sick\"   • Penicillins      Never taken but has family history of anaphylaxis death       Social Hx:   Social History     Socioeconomic History   • Marital status:      Spouse name: Not on file   • Number of " "children: Not on file   • Years of education: Not on file   • Highest education level: Not on file   Occupational History   • Not on file   Social Needs   • Financial resource strain: Not on file   • Food insecurity:     Worry: Not on file     Inability: Not on file   • Transportation needs:     Medical: Not on file     Non-medical: Not on file   Tobacco Use   • Smoking status: Never Smoker   • Smokeless tobacco: Never Used   Substance and Sexual Activity   • Alcohol use: Yes     Frequency: 2-3 times a week     Drinks per session: 1 or 2     Binge frequency: Never     Comment: 2-3 drinks a week    • Drug use: Yes     Comment: indica tablets for sleep   • Sexual activity: Not Currently   Lifestyle   • Physical activity:     Days per week: Not on file     Minutes per session: Not on file   • Stress: Not on file   Relationships   • Social connections:     Talks on phone: Not on file     Gets together: Not on file     Attends Druze service: Not on file     Active member of club or organization: Not on file     Attends meetings of clubs or organizations: Not on file     Relationship status: Not on file   • Intimate partner violence:     Fear of current or ex partner: Not on file     Emotionally abused: Not on file     Physically abused: Not on file     Forced sexual activity: Not on file   Other Topics Concern   •  Service No   • Blood Transfusions No   • Caffeine Concern No   • Occupational Exposure No   • Hobby Hazards Yes   • Sleep Concern Yes   • Stress Concern No   • Weight Concern Yes   • Special Diet No   • Back Care No   • Exercise Yes   • Bike Helmet No   • Seat Belt Yes   • Self-Exams Yes   Social History Narrative   • Not on file         EXAMINATION     Physical Exam:   Vitals: /62 (BP Location: Left arm, Patient Position: Sitting, BP Cuff Size: Adult)   Pulse (!) 56   Temp 36.1 °C (97 °F) (Temporal)   Ht 1.575 m (5' 2\")   Wt 70.3 kg (155 lb)   SpO2 98%     Constitutional:   Body " Habitus: Body mass index is 28.35 kg/m².  Cooperation: Fully cooperates with exam  Appearance: Well-groomed no disheveled    Respiratory-  breathing comfortable on room air, no audible wheezing  Cardiovascular- capillary refills less than 2 seconds. No lower extremity edema is noted.   Psychiatric- alert and oriented ×3. Normal affect.    MSK: -     Positive mild tenderness palpation of the mid cervical facet joints on the right side of the neck.  Limited range of motion with lateral bending to the right which reproduces axial neck pain.  Spurling's is negative.    right and left Knee:   Inspection no swelling, rashes or deformities,   Palpation no significant tenderness to palpation throughout the knee including the medial joint line, lateral joint line, quadriceps tendon, patellar tendon, patellar, medial patellar facets, lateral patellar facets, tibial tuberosity, fibular head.  Range of motion normal range of motion in flexion and extension  Special tests:   Varus stress tests: Negative  Valgus stress tests: Negative  Lockman's test: Negative  Anterior drawer: Negative  Posterior drawer: Negative  Nu's: negative  J sign: negative         Key points for the international standards for neurological classification of spinal cord injury (ISNCSCI) to light touch.     Dermatome R L   C4 2 2   C5 2 2   C6 2 2   C7 2 2   C8 2 2   T1 2 2   T2 2 2                                         Motor Exam Upper Extremities   ? Myotome R L   Shoulder flexion C5 5 5   Elbow flexion C5 5 5   Wrist extension C6 5 5   Elbow extension C7 5 5   Finger flexion C8 5 5   Finger abduction T1 5 5           Key points for the international standards for neurological classification of spinal cord injury (ISNCSCI) to light touch.     Dermatome R L                                      L2 2 2   L3 2 2   L4 2 2   L5 2 2   S1 2 2   S2 2 2         Motor Exam Lower Extremities    ? Myotome R L   Hip flexion L2 5 5   Knee extension L3 5 5    Ankle dorsiflexion L4 5 5   Toe extension L5 5 5   Ankle plantarflexion S1 5 5                 MEDICAL DECISION MAKING    DATA    Labs:   Lab Results   Component Value Date/Time    SODIUM 139 05/28/2019 12:22 PM    POTASSIUM 3.8 05/28/2019 12:22 PM    CHLORIDE 106 05/28/2019 12:22 PM    CO2 24 05/28/2019 12:22 PM    GLUCOSE 83 05/28/2019 12:22 PM    BUN 14 05/28/2019 12:22 PM    CREATININE 0.71 05/28/2019 12:22 PM        No results found for: PROTHROMBTM, INR     Lab Results   Component Value Date/Time    WBC 7.4 05/28/2019 12:22 PM    RBC 5.06 05/28/2019 12:22 PM    HEMOGLOBIN 14.7 05/28/2019 12:22 PM    HEMATOCRIT 44.4 05/28/2019 12:22 PM    MCV 87.7 05/28/2019 12:22 PM    MCH 29.1 05/28/2019 12:22 PM    MCHC 33.1 (L) 05/28/2019 12:22 PM    MPV 9.9 05/28/2019 12:22 PM    NEUTSPOLYS 59.30 05/28/2019 12:22 PM    LYMPHOCYTES 29.90 05/28/2019 12:22 PM    MONOCYTES 5.70 05/28/2019 12:22 PM    EOSINOPHILS 3.20 05/28/2019 12:22 PM    BASOPHILS 1.10 05/28/2019 12:22 PM        No results found for: HBA1C       Imaging:   I personally reviewed following images    MRI cervical spine 6/6/2019  Moderate right C3-4 neuroforaminal stenosis.  Moderate bilateral C5-6 neuroforaminal stenosis.  Facet arthropathy is present right worse than left C3-4, C4-5, C5-6.    X-ray bilateral knees 8/23/2019  Minimal bilateral osteoarthritis of the knees.    I reviewed the following radiology reports                                 Results for orders placed during the hospital encounter of 06/06/19   MR-CERVICAL SPINE-W/O    Impression 1.  Multilevel degenerative disc disease, uncinate and facet degeneration. There are varying degrees of neural foraminal narrowing at levels as specifically described above. No significant central canal narrowing.    2.  Loss of the normal cervical lordosis.                                                                                Results for orders placed during the hospital encounter of 06/03/19    DX-CERVICAL SPINE-4+ VIEWS    Impression 1.  There is degenerative disease and arthropathy predominantly at the C5-6 and C6-7 levels.  2.  The alignment is normal. There is no abnormal motion.                                          Results for orders placed during the hospital encounter of 08/23/19   DX-KNEE 3 VIEWS RIGHT    Impression Minimal tricompartmental osteoarthrosis bilaterally. No erosions.      Results for orders placed during the hospital encounter of 08/23/19   DX-KNEES-AP BILATERAL STANDING    Impression Minimal tricompartmental osteoarthrosis bilaterally. No erosions.                                               DIAGNOSIS   Visit Diagnoses     ICD-10-CM   1. Spondylosis of cervical region without myelopathy or radiculopathy M47.812   2. Chronic pain of both knees M25.561    M25.562    G89.29   3. Primary osteoarthritis of both knees M17.0         ASSESSMENT and PLAN:     Audrey Aquino 69 y.o. female      Audrey was seen today for follow-up.    Diagnoses and all orders for this visit:    Spondylosis of cervical region without myelopathy or radiculopathy  Comments:  Stable.  Improved with physical therapy.  Consider medial branch block if symptoms worsen    Chronic pain of both knees  Comments:  Stable.  Improved with PT    Primary osteoarthritis of both knees  Comments:  Stable.  Significantly improved with physical therapy.  We discussed the importance of her home exercise program.          Follow up: 1 year    Thank you for allowing me to participate in the care of this patient. If you have any questions please not hesitate to contact me.        Please note that this dictation was created using voice recognition software. I have made every reasonable attempt to correct obvious errors but there may be errors of grammar and content that I may have overlooked prior to finalization of this note.      Ru Estrada MD  Interventional Spine and Sports Physiatry  Physical Medicine and  [facility]

## 2019-11-18 NOTE — Clinical Note
Dear Airam Ardon M.D. , Thank you for the referral of Audrey Aquino.  The patient is doing very well with significant improvement in her function and pain.  I will plan to follow-up with her in 1 year.  Please see my note for more details Should you have any questions or concerns please do not hesitate to contact me. Ru Estrada M.D.

## 2019-12-11 ENCOUNTER — PATIENT MESSAGE (OUTPATIENT)
Dept: MEDICAL GROUP | Facility: MEDICAL CENTER | Age: 69
End: 2019-12-11

## 2019-12-11 ENCOUNTER — HOSPITAL ENCOUNTER (OUTPATIENT)
Dept: LAB | Facility: MEDICAL CENTER | Age: 69
End: 2019-12-11
Attending: FAMILY MEDICINE
Payer: MEDICARE

## 2019-12-11 DIAGNOSIS — E78.1 HYPERTRIGLYCERIDEMIA: ICD-10-CM

## 2019-12-11 LAB
CHOLEST SERPL-MCNC: 186 MG/DL (ref 100–199)
FASTING STATUS PATIENT QL REPORTED: NORMAL
HDLC SERPL-MCNC: 54 MG/DL
LDLC SERPL CALC-MCNC: 105 MG/DL
TRIGL SERPL-MCNC: 135 MG/DL (ref 0–149)

## 2019-12-11 PROCEDURE — 80061 LIPID PANEL: CPT

## 2019-12-11 PROCEDURE — 36415 COLL VENOUS BLD VENIPUNCTURE: CPT

## 2019-12-12 NOTE — TELEPHONE ENCOUNTER
From: Audrey Aquino  To: Airam Ardon M.D.  Sent: 12/11/2019 7:57 PM PST  Subject: Test Result Question    Thanks for your notes on my lipid profile. I am happy with the results. I have never had a 'normal' LDL but I think this is as low as it has ever been, and I am relieved the TG's are no longer at a dangerous level.    **is there any reason for me to still come in next week? I am not sure what we were going to do other than review the results.**     I assume we will continue this therapy and recheck the labs in 6 months or so?  what is your advice? will you send me the lab slip by mail?  Audrey Aquino

## 2020-01-10 ENCOUNTER — APPOINTMENT (OUTPATIENT)
Dept: DERMATOLOGY | Facility: IMAGING CENTER | Age: 70
End: 2020-01-10

## 2020-01-17 DIAGNOSIS — N95.2 POSTMENOPAUSE ATROPHIC VAGINITIS: ICD-10-CM

## 2020-01-20 ENCOUNTER — TELEPHONE (OUTPATIENT)
Dept: MEDICAL GROUP | Facility: MEDICAL CENTER | Age: 70
End: 2020-01-20

## 2020-01-20 DIAGNOSIS — N95.2 POSTMENOPAUSE ATROPHIC VAGINITIS: ICD-10-CM

## 2020-01-20 RX ORDER — ESTRADIOL 10 UG/1
INSERT VAGINAL
Qty: 30 TAB | Refills: 2 | Status: SHIPPED | OUTPATIENT
Start: 2020-01-20 | End: 2023-10-11

## 2020-01-20 RX ORDER — ESTRADIOL 10 UG/1
INSERT VAGINAL
Qty: 4 TAB | Refills: 2 | Status: SHIPPED | OUTPATIENT
Start: 2020-01-20 | End: 2020-01-20 | Stop reason: SDUPTHER

## 2020-01-20 NOTE — TELEPHONE ENCOUNTER
Was the patient seen in the last year in this department? Yes    Does patient have an active prescription for medications requested? Yes    Received Request Via: Pharmacy

## 2020-01-29 ENCOUNTER — SLEEP CENTER VISIT (OUTPATIENT)
Dept: SLEEP MEDICINE | Facility: MEDICAL CENTER | Age: 70
End: 2020-01-29
Payer: MEDICARE

## 2020-01-29 VITALS
OXYGEN SATURATION: 97 % | WEIGHT: 155 LBS | DIASTOLIC BLOOD PRESSURE: 68 MMHG | SYSTOLIC BLOOD PRESSURE: 104 MMHG | HEART RATE: 70 BPM | BODY MASS INDEX: 28.52 KG/M2 | RESPIRATION RATE: 16 BRPM | HEIGHT: 62 IN

## 2020-01-29 DIAGNOSIS — G47.33 OSA (OBSTRUCTIVE SLEEP APNEA): ICD-10-CM

## 2020-01-29 PROCEDURE — 99214 OFFICE O/P EST MOD 30 MIN: CPT | Performed by: NURSE PRACTITIONER

## 2020-01-29 NOTE — PATIENT INSTRUCTIONS
1) Continue autoCPAP at [phone]  2) Clean mask and supplies weekly and change them as insurance allows  3) Dietary and light conditioning encouraged  4) Vaccines: Up to date with Prevnar 13, Pneumovax 23, flu  5) Return in about 4 months (around 5/29/2020) for sleep study results.   6) home sleep study for reevaluation after weight loss

## 2020-01-29 NOTE — PROGRESS NOTES
CC:  Here for f/u sleep issues as listed below    HPI:   Audrey presents today for follow up obstructive sleep apnea.  Past medical history of asthma, insomnia, hyperlipidemia, migraine.     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  She was originally diagnosed at [facility] in 2014 with an AHI of 11.7.  Since then she has been prescribed a mandibular device for treatment.  She has had repeat sleep studies to show effectiveness of her device.  Over the last year she required an updated mandibular device and has not been sleeping as well feeling more tired, not feeling as refreshed, and snoring again.  Her weight is up a little bit.    For reevaluation a HST with her mandibular device was completed 9/2019 showed an TELLY of 5.8 and low oxygenation of 82%.    She then opted to obtain a CPAP machine because she was unable to change the position of the mandibular device without having pain.     Currently she is being treated with autoCPAP @ [id number].  Compliance download from the dates 12/30/2019 - 1/28/2020 indicates she is wearing the device 90% for an avg of 4 hours and 56 minutes per night with a reduced AHI of 0.5.  She falls asleep fine, but then wakes after 2-4 hours, then puts her dental appliance in. She sleeps   She does tolerate pressure and mask well.  She wakes up refreshed and is less tired throughout the day. When unable to use machine x 3 days she felt tired during the day. Resolved morning H/A. She sleeps better overall. She will continue to clean supplies weekly and change them as insurance allows.          Patient Active Problem List    Diagnosis Date Noted   • Gingivitis 09/18/2019   • Foraminal stenosis of cervical region 06/03/2019   • Trigger ring finger of right hand 12/13/2018   • Cough variant asthma 10/16/2018   • Intractable migraine without aura and without status migrainosus 10/16/2018   • Primary insomnia 10/16/2018   • Bilateral patellofemoral  syndrome 10/16/2018   • Hx of total hysterectomy 10/16/2018   • Vaginal atrophy 10/16/2018   • Obstructive sleep apnea syndrome 09/12/2014   • History of radial keratotomy 12/08/2010   • Acquired absence of other genital organ(s) 12/22/2008   • Mixed hyperlipidemia 09/21/2007   • Diverticulosis of colon 10/20/2006   • Allergic rhinitis 01/15/2003       Past Medical History:   Diagnosis Date   • Asthma     cough equivalent   • Dry eyes    • Frequent headaches    • Gingivitis    • History of hormone therapy    • Hyperlipidemia    • Knee pain    • Overweight    • Shoulder injury    • Sleep disorder        Past Surgical History:   Procedure Laterality Date   • EYE SURGERY      right cataract remobal/lens placement       Family History   Problem Relation Age of Onset   • Cancer Mother         breast       Social History     Socioeconomic History   • Marital status:      Spouse name: Not on file   • Number of children: Not on file   • Years of education: Not on file   • Highest education level: Not on file   Occupational History   • Not on file   Social Needs   • Financial resource strain: Not on file   • Food insecurity:     Worry: Not on file     Inability: Not on file   • Transportation needs:     Medical: Not on file     Non-medical: Not on file   Tobacco Use   • Smoking status: Never Smoker   • Smokeless tobacco: Never Used   Substance and Sexual Activity   • Alcohol use: Yes     Frequency: 2-3 times a week     Drinks per session: 1 or 2     Binge frequency: Never     Comment: 2-3 drinks a week    • Drug use: Yes     Comment: indica tablets for sleep   • Sexual activity: Not Currently   Lifestyle   • Physical activity:     Days per week: Not on file     Minutes per session: Not on file   • Stress: Not on file   Relationships   • Social connections:     Talks on phone: Not on file     Gets together: Not on file     Attends Yarsanism service: Not on file     Active member of club or organization: Not on file      Attends meetings of clubs or organizations: Not on file     Relationship status: Not on file   • Intimate partner violence:     Fear of current or ex partner: Not on file     Emotionally abused: Not on file     Physically abused: Not on file     Forced sexual activity: Not on file   Other Topics Concern   •  Service No   • Blood Transfusions No   • Caffeine Concern No   • Occupational Exposure No   • Hobby Hazards Yes   • Sleep Concern Yes   • Stress Concern No   • Weight Concern Yes   • Special Diet No   • Back Care No   • Exercise Yes   • Bike Helmet No   • Seat Belt Yes   • Self-Exams Yes   Social History Narrative   • Not on file       Current Outpatient Medications   Medication Sig Dispense Refill   • estradiol (VAGIFEM) 10 MCG Tab insert 1 tablet vaginally once daily as directed 30 Tab 2   • Estradiol 0.025 MG/24HR PATCH BIWEEKLY APPLY 1 PATCH TWICE WEEKLY AS DIRECTED 24 Patch 3   • fenofibrate (TRICOR) 145 MG Tab Take 1 Tab by mouth every day. 90 Tab 2   • zolpidem (AMBIEN) 5 MG Tab Take 5 mg by mouth at bedtime as needed for Sleep.     • Coenzyme Q10 (COQ10) 100 MG Cap Take  by mouth.     • Multiple Vitamins-Minerals (DRY EYE FORMULA) Cap Take  by mouth.     • tretinoin (RETIN-A) 0.025 % cream Apply to affected area(s) daily at bedtime     • CALCIUM-MAGNESIUM PO Take  by mouth.     • Red Yeast Rice Extract ([employer] RED YEAST RICE) 600 MG Cap Take  by mouth.     • Plant Sterols and Stanols (CHOLESTOFF PO) Take  by mouth.       No current facility-administered medications for this visit.           Allergies: Codeine and Penicillins      ROS   Gen: Denies fever, chills, unintentional weight loss, fatigue  Resp:Denies Dyspnea  CV: Denies chest pain, chest tightness  Sleep:Denies morning headache, insomnia, daytime somnolence, snoring, gasping for air, apnea  Neuro: Denies frequent headaches, weakness, dizziness  See HPI.  All other systems reviewed and negative        Vital signs for this  "encounter:  Vitals:    01/29/20 0825   Height: 1.575 m (5' 2\")   Weight: 70.3 kg (155 lb)   Weight % change since last entry.: 0 %   BP: 104/68   Pulse: 70   BMI (Calculated): 28.35   Resp: 16                   Physical Exam:   Gen:         Alert and oriented, No apparent distress.   Neck:        No Lymphadenopathy.  Lungs:     Clear to auscultation bilaterally.    CV:          Regular rate and rhythm. No murmurs, rubs or gallops.   Abd:         Soft non tender, non distended.            Ext:          No clubbing, cyanosis, edema.    Assessment   1. GÓMEZ (obstructive sleep apnea)  Overnight Home Sleep Study       Face-to-face time greater than 50% in 25 minutes reviewing GÓMEZ, compliance, questions or concerns related to dental appliance, updated sleep study.  Patient is clinically stable and will proceed with following plan.  She plans to lose another 20 pounds in the next 6 weeks and then would like a sleep study to reevaluate.  She understands this needs to be without the dental appliance or CPAP machine.  Based on those results she will then like to complete another sleep study on the dental appliance if required.  She would like to stick with a dental appliance if effective.    PLAN:   Patient Instructions   1) Continue autoCPAP at [id number]  2) Clean mask and supplies weekly and change them as insurance allows  3) Dietary and light conditioning encouraged  4) Vaccines: Up to date with Prevnar 13, Pneumovax 23, flu  5) Return in about 4 months (around 5/29/2020) for sleep study results.   6) home sleep study for reevaluation after weight loss        "

## 2020-02-13 ENCOUNTER — PATIENT MESSAGE (OUTPATIENT)
Dept: SLEEP MEDICINE | Facility: MEDICAL CENTER | Age: 70
End: 2020-02-13

## 2020-04-17 ENCOUNTER — PATIENT MESSAGE (OUTPATIENT)
Dept: MEDICAL GROUP | Facility: MEDICAL CENTER | Age: 70
End: 2020-04-17

## 2020-04-23 ENCOUNTER — PATIENT MESSAGE (OUTPATIENT)
Dept: SLEEP MEDICINE | Facility: MEDICAL CENTER | Age: 70
End: 2020-04-23

## 2020-04-23 NOTE — PATIENT COMMUNICATION
Audrey Aquino, THAIS Fox 14 minutes ago (8:31 AM)         I just received notification that my appointment on 5/6 was cancelled.  What about the sleep study next week?  should we put if off?  can I have my followup by video or phone call?  thanks  Audrey Aquino       Home study 04/28/2020  Next OV 05/20/2020- Mary      PLAN:   Patient Instructions   1) Continue autoCPAP at [id number]  2) Clean mask and supplies weekly and change them as insurance allows  3) Dietary and light conditioning encouraged  4) Vaccines: Up to date with Prevnar 13, Pneumovax 23, flu  5) Return in about 4 months (around 5/29/2020) for sleep study results.   6) home sleep study for reevaluation after weight loss

## 2020-04-23 NOTE — TELEPHONE ENCOUNTER
"From: Audrey Aquino  To: THAIS Mc  Sent: 4/23/2020 8:29 AM PDT  Subject: Procedure Question    I just was notified that my sleep study for next week is cancelled. no reason was given. can you please let me know how I will be following up for my GÓMEZ?  the plan had been for me to stop using the treatments I am currently using for 2 days and to do a new study to \"re-diagnose\" the problem and then decide how to proceed with treatment.  I would have assumed that the home sleep equipment could be disinfected. So please let me know what is planned.  thanks.  Audrey Aquino  "

## 2020-04-23 NOTE — TELEPHONE ENCOUNTER
"From: Audrey Aquino  To: Mary Fields, Med Ass't  Sent: 4/23/2020 2:33 PM PDT  Subject: Procedure Question    My chart says the appointment to  equipment is on Tuesday 4/28. Correct?      ----- Message -----   From:Mary Fields, Med Ass't   Sent:4/23/2020 2:04 PM PDT   To:Audrey Aquino   Subject:RE: Procedure Question    You home sleep study was not canceled. You had 2 follow up appointments scheduled for 5/6/2020 and again on 5/20/2020, the 5/6/2020 was a double book so that appt was cancelled  You are still scheduled to  your home sleep study on 4/27/2020 (anytime between 2-4pm)  You are scheduled to go over those results on 5/20/2020 at 8:40 am   I apologize for the confusion    Mary       ----- Message -----   From:Audrey Aquino   Sent:4/23/2020 8:29 AM PDT   To:THAIS Mc   Subject:Procedure Question    I just was notified that my sleep study for next week is cancelled. no reason was given. can you please let me know how I will be following up for my GÓMEZ?  the plan had been for me to stop using the treatments I am currently using for 2 days and to do a new study to \"re-diagnose\" the problem and then decide how to proceed with treatment.  I would have assumed that the home sleep equipment could be disinfected. So please let me know what is planned.  thanks.  Audrey Aquino  "

## 2020-04-27 ENCOUNTER — TELEMEDICINE (OUTPATIENT)
Dept: MEDICAL GROUP | Facility: MEDICAL CENTER | Age: 70
End: 2020-04-27
Payer: MEDICARE

## 2020-04-27 VITALS — HEART RATE: 56 BPM | DIASTOLIC BLOOD PRESSURE: 70 MMHG | SYSTOLIC BLOOD PRESSURE: 110 MMHG

## 2020-04-27 DIAGNOSIS — G47.33 OBSTRUCTIVE SLEEP APNEA SYNDROME: ICD-10-CM

## 2020-04-27 DIAGNOSIS — N95.2 VAGINAL ATROPHY: ICD-10-CM

## 2020-04-27 DIAGNOSIS — J45.991 COUGH VARIANT ASTHMA: ICD-10-CM

## 2020-04-27 DIAGNOSIS — E78.1 HYPERTRIGLYCERIDEMIA: ICD-10-CM

## 2020-04-27 PROCEDURE — 99214 OFFICE O/P EST MOD 30 MIN: CPT | Mod: 95,CR | Performed by: FAMILY MEDICINE

## 2020-04-27 NOTE — PROGRESS NOTES
This encounter was conducted via Zoom meeting  Verbal consent was obtained. Patient's identity was verified.       CC: Hypertriglyceridemia, cough variant asthma, vaginal atrophy, sleep apnea                                                                                                                                     HPI:   Audrey presents today with the following.    Hypertriglyceridemia  Patient has chronic hypertrophy of triglyceridemia, was started on fenofibrate 145 mg, which went down from 526 to 135.  Patient stated this is best number she had a her TG in her life.      Cough variant asthma  Patient develops cough once in a while only Advair has been helping when she has not been taking it in a regular basis.  Currently asymptomatic, denies any cough or shortness of breath.       Vaginal atrophy  Patient has been on estradiol patches biweekly.  Has been helping.    Obstructive sleep apnea  Patient was diagnosed with associated obstructive sleep apnea many years ago however she has been using CPAP, denies any morning headache, and daytime somnolence.      Patient Active Problem List    Diagnosis Date Noted   • Hypertriglyceridemia 04/27/2020   • Gingivitis 09/18/2019   • Foraminal stenosis of cervical region 06/03/2019   • Trigger ring finger of right hand 12/13/2018   • Cough variant asthma 10/16/2018   • Intractable migraine without aura and without status migrainosus 10/16/2018   • Primary insomnia 10/16/2018   • Bilateral patellofemoral syndrome 10/16/2018   • Hx of total hysterectomy 10/16/2018   • Vaginal atrophy 10/16/2018   • Obstructive sleep apnea syndrome 09/12/2014   • History of radial keratotomy 12/08/2010   • Acquired absence of other genital organ(s) 12/22/2008   • Mixed hyperlipidemia 09/21/2007   • Diverticulosis of colon 10/20/2006   • Allergic rhinitis 01/15/2003       Current Outpatient Medications   Medication Sig Dispense Refill   • fluticasone-salmeterol (ADVAIR) 100-50 MCG/DOSE  AEROSOL POWDER, BREATH ACTIVATED Inhale 1 Puff by mouth every 12 hours. 1 Inhaler 3   • estradiol (VAGIFEM) 10 MCG Tab insert 1 tablet vaginally once daily as directed 30 Tab 2   • Estradiol 0.025 MG/24HR PATCH BIWEEKLY APPLY 1 PATCH TWICE WEEKLY AS DIRECTED 24 Patch 3   • fenofibrate (TRICOR) 145 MG Tab Take 1 Tab by mouth every day. 90 Tab 2   • zolpidem (AMBIEN) 5 MG Tab Take 5 mg by mouth at bedtime as needed for Sleep.     • Coenzyme Q10 (COQ10) 100 MG Cap Take  by mouth.     • Red Yeast Rice Extract ([employer] RED YEAST RICE) 600 MG Cap Take  by mouth.     • Multiple Vitamins-Minerals (DRY EYE FORMULA) Cap Take  by mouth.     • Plant Sterols and Stanols (CHOLESTOFF PO) Take  by mouth.     • tretinoin (RETIN-A) 0.025 % cream Apply to affected area(s) daily at bedtime     • CALCIUM-MAGNESIUM PO Take  by mouth.       No current facility-administered medications for this visit.          Allergies as of 04/27/2020 - Reviewed 01/29/2020   Allergen Reaction Noted   • Codeine  10/16/2018   • Penicillins  10/16/2018        ROS:  Denies, chest pain, Shortness of breath, Edema.       Physical Exam:    /70   Pulse (!) 56     Constitutional: Alert, no distress, well-groomed.  Skin: No rashes in visible areas.  Eye: Round. Conjunctiva clear, lNo icterus.   ENMT: Lips pink without lesions, good dentition, moist mucous membranes. Phonation normal.  Neck: No masses, no thyromegaly. Moves freely without pain.  CV: Pulse as reported by patient  Respiratory: Unlabored respiratory effort, no cough or audible wheeze  Psych: Alert and oriented x3, normal affect and mood.        Assessment and Plan.   69 y.o. female with the following issues.      1. Hypertriglyceridemia  Triglycerides has improved from 526 to 135.  Continue fenofibrate 145 mg daily.    - LIPID PANEL    2. Cough variant asthma  Stable.  Advair has been helping.    - fluticasone-salmeterol (ADVAIR) 100-50 MCG/DOSE AEROSOL POWDER, BREATH ACTIVATED; Inhale 1 Puff by  mouth every 12 hours.  Dispense: 1 Inhaler; Refill: 3    3. Vaginal atrophy  Stable.  Estradiol patches biweekly, and Vagifem has been helping.    4. Obstructive sleep apnea syndrome  CPAP has been helping currently asymptomatic.

## 2020-04-28 ENCOUNTER — HOME STUDY (OUTPATIENT)
Dept: SLEEP MEDICINE | Facility: MEDICAL CENTER | Age: 70
End: 2020-04-28
Attending: NURSE PRACTITIONER
Payer: MEDICARE

## 2020-04-28 DIAGNOSIS — G47.33 OSA (OBSTRUCTIVE SLEEP APNEA): ICD-10-CM

## 2020-04-28 PROCEDURE — G0399 HOME SLEEP TEST/TYPE 3 PORTA: HCPCS | Performed by: INTERNAL MEDICINE

## 2020-04-30 NOTE — PROCEDURES
The patient is a 69-year-old female with history of GÓMEZ.  Home polysomnography requested for reassessment of GÓMEZ following weight loss.  She has no contraindications to home sleep study monitoring.    A total recording time of 321 minutes was obtained with good-quality data noted.    There were 146 snore episodes noted associated with obstructive apneas and hypopneas.  The overall TELLY was 29/h and associated with desaturations to a da of 74%.  In the supine position the TELLY was 49/h.  She spent 33% of the night below SPO2 of 90%.  Mean heart rate was 50 bpm.    Interpretation:  Moderate to severe GÓMEZ, overall TELLY 29/h, with desaturations to 74% SPO2.    Recommendations:  Treatment options for GÓMEZ include CPAP, Inspire or a dental appliance.  The patient should follow-up in the sleep clinic to discuss appropriate therapy.

## 2020-05-07 ENCOUNTER — TELEMEDICINE (OUTPATIENT)
Dept: SLEEP MEDICINE | Facility: MEDICAL CENTER | Age: 70
End: 2020-05-07
Payer: MEDICARE

## 2020-05-07 VITALS — HEIGHT: 62 IN | WEIGHT: 150 LBS | BODY MASS INDEX: 27.6 KG/M2

## 2020-05-07 DIAGNOSIS — J45.991 COUGH VARIANT ASTHMA: ICD-10-CM

## 2020-05-07 DIAGNOSIS — F51.01 PRIMARY INSOMNIA: ICD-10-CM

## 2020-05-07 DIAGNOSIS — G47.33 OBSTRUCTIVE SLEEP APNEA SYNDROME: ICD-10-CM

## 2020-05-07 DIAGNOSIS — Z78.9 NONSMOKER: ICD-10-CM

## 2020-05-07 PROCEDURE — 99212 OFFICE O/P EST SF 10 MIN: CPT | Mod: 95,CR | Performed by: NURSE PRACTITIONER

## 2020-05-07 ASSESSMENT — FIBROSIS 4 INDEX: FIB4 SCORE: 1.17

## 2020-05-07 NOTE — PROGRESS NOTES
Telemedicine Visit: Established Patient     This encounter was conducted via Zoom .   Verbal consent was obtained. Patient's identity was verified.    Subjective:   CC: sleep study results and compliance check  Audrey Aquino is a 69 y.o. female presenting for evaluation and management of:    Past medical history of asthma, insomnia, hyperlipidemia, migraine.     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  She was originally diagnosed at [facility] in 2014 with an AHI of 11.7.  Since then she has been prescribed a mandibular device for treatment.  She has had repeat sleep studies to show effectiveness of her device.  Over the last year she required an updated mandibular device and has not been sleeping as well feeling more tired, not feeling as refreshed, and snoring again.  Her weight is up a little bit.  For reevaluation a HST with her mandibular device was completed 9/2019 showed an TELLY of 5.8 and low oxygenation of 82%.    She then opted to obtain a CPAP machine because she was unable to change the position of the mandibular device without having pain. She was referred to [facility] for evaluation.   Currently she is being treated with autoCPAP @ 5-12tcI13 and has been on it for 6mos. She tends to use it 4-5hrs per night, wakes, and then uses her dental device till she wakes and feels she sleeps well. She is benefiting from dual therapy and is motivated to continue with it. She does go camping and finds dental device is effective at those times.  HST updated 4/20/2020 noted moderate to severe GÓMEZ with an overall a TELLY of 29/h with O2 da of 74%.  Patient was less than 90% for 33% of study time.  Supine position TELLY was 49/h.  Average heart rate is 50 bpm.  I reviewed finds with patient.  She understands the benefit of therapy. She tolerates mask and pressure. She notes no daytime dozing/napping or feeling sleepy. She denies morning headaches. She feels she sleeps well.  She  "denies cardiac or respiratory symptoms at this time.    ROS in HPI; otherwise negative.      Allergies   Allergen Reactions   • Codeine      \"Makes me sick\"   • Penicillins      Never taken but has family history of anaphylaxis death       Current medicines (including changes today)  Current Outpatient Medications   Medication Sig Dispense Refill   • fluticasone-salmeterol (ADVAIR) 100-50 MCG/DOSE AEROSOL POWDER, BREATH ACTIVATED Inhale 1 Puff by mouth every 12 hours. 1 Inhaler 3   • estradiol (VAGIFEM) 10 MCG Tab insert 1 tablet vaginally once daily as directed 30 Tab 2   • Estradiol 0.025 MG/24HR PATCH BIWEEKLY APPLY 1 PATCH TWICE WEEKLY AS DIRECTED 24 Patch 3   • fenofibrate (TRICOR) 145 MG Tab Take 1 Tab by mouth every day. 90 Tab 2   • zolpidem (AMBIEN) 5 MG Tab Take 5 mg by mouth at bedtime as needed for Sleep.     • Coenzyme Q10 (COQ10) 100 MG Cap Take  by mouth.     • Multiple Vitamins-Minerals (DRY EYE FORMULA) Cap Take  by mouth.     • tretinoin (RETIN-A) 0.025 % cream Apply to affected area(s) daily at bedtime     • CALCIUM-MAGNESIUM PO Take  by mouth.       No current facility-administered medications for this visit.        Patient Active Problem List    Diagnosis Date Noted   • Hypertriglyceridemia 04/27/2020   • Gingivitis 09/18/2019   • Foraminal stenosis of cervical region 06/03/2019   • Trigger ring finger of right hand 12/13/2018   • Cough variant asthma 10/16/2018   • Intractable migraine without aura and without status migrainosus 10/16/2018   • Primary insomnia 10/16/2018   • Bilateral patellofemoral syndrome 10/16/2018   • Hx of total hysterectomy 10/16/2018   • Vaginal atrophy 10/16/2018   • Obstructive sleep apnea syndrome 09/12/2014   • History of radial keratotomy 12/08/2010   • Acquired absence of other genital organ(s) 12/22/2008   • Mixed hyperlipidemia 09/21/2007   • Diverticulosis of colon 10/20/2006   • Allergic rhinitis 01/15/2003       Family History   Problem Relation Age of Onset " "  • Cancer Mother         breast       She  has a past medical history of Asthma, Dry eyes, Frequent headaches, Gingivitis, History of hormone therapy, Hyperlipidemia, Knee pain, Overweight, Shoulder injury, and Sleep disorder.  She  has a past surgical history that includes eye surgery.       Objective:   Vitals obtained by patient:  Height: 5'2\" and Weight: 150lbs    Physical Exam:  Constitutional: Alert, no distress, well-groomed.  Skin: No rashes in visible areas.  Eye: Round. Conjunctiva clear, lids normal. No icterus.   ENMT: Lips pink without lesions, good dentition, moist mucous membranes. Phonation normal.  Neck: No masses, no thyromegaly. Moves freely without pain.  CV: Pulse as reported by patient  Respiratory: Unlabored respiratory effort, no cough or audible wheeze  Psych: Alert and oriented x3, normal affect and mood.       Assessment and Plan:   The following treatment plan was discussed:     1. Obstructive sleep apnea syndrome    2. Primary insomnia    3. Cough variant asthma    4. BMI 27.0-27.9,adult    5. Nonsmoker    Continue APAP 5-15cm H20  Continue dentale device  DME mask/supplies  Referral [facility] for dual therapy and updated device  Encouraged routine activity  F/u with PCP for other health concerns  May call office to setup HST prior to next OV    Follow-up: 4-5 mos for compliance check on cpap and repeat HST with dental device, sooner if needed.           "

## 2020-05-29 DIAGNOSIS — E78.1 HYPERTRIGLYCERIDEMIA: ICD-10-CM

## 2020-05-29 RX ORDER — FENOFIBRATE 145 MG/1
145 TABLET, COATED ORAL DAILY
Qty: 90 TAB | Refills: 2 | Status: SHIPPED | OUTPATIENT
Start: 2020-05-29 | End: 2020-06-02 | Stop reason: SDUPTHER

## 2020-05-29 NOTE — TELEPHONE ENCOUNTER
Received request via: Patient    Was the patient seen in the last year in this department? Yes       Does the patient have an active prescription (recently filled or refills available) for medication(s) requested? No

## 2020-06-02 DIAGNOSIS — E78.1 HYPERTRIGLYCERIDEMIA: ICD-10-CM

## 2020-06-02 RX ORDER — FENOFIBRATE 145 MG/1
145 TABLET, COATED ORAL DAILY
Qty: 90 TAB | Refills: 2 | Status: SHIPPED | OUTPATIENT
Start: 2020-06-02 | End: 2021-01-06 | Stop reason: SDUPTHER

## 2020-06-05 ENCOUNTER — HOSPITAL ENCOUNTER (OUTPATIENT)
Dept: LAB | Facility: MEDICAL CENTER | Age: 70
End: 2020-06-05
Attending: FAMILY MEDICINE
Payer: MEDICARE

## 2020-06-05 LAB
CHOLEST SERPL-MCNC: 197 MG/DL (ref 100–199)
FASTING STATUS PATIENT QL REPORTED: NORMAL
HDLC SERPL-MCNC: 76 MG/DL
LDLC SERPL CALC-MCNC: 105 MG/DL
TRIGL SERPL-MCNC: 79 MG/DL (ref 0–149)

## 2020-06-05 PROCEDURE — 80061 LIPID PANEL: CPT

## 2020-06-05 PROCEDURE — 36415 COLL VENOUS BLD VENIPUNCTURE: CPT

## 2020-10-22 ENCOUNTER — PATIENT MESSAGE (OUTPATIENT)
Dept: MEDICAL GROUP | Facility: MEDICAL CENTER | Age: 70
End: 2020-10-22

## 2020-10-22 ENCOUNTER — NURSE TRIAGE (OUTPATIENT)
Dept: HEALTH INFORMATION MANAGEMENT | Facility: OTHER | Age: 70
End: 2020-10-22

## 2020-10-22 ENCOUNTER — HOSPITAL ENCOUNTER (OUTPATIENT)
Dept: LAB | Facility: MEDICAL CENTER | Age: 70
End: 2020-10-22
Attending: FAMILY MEDICINE
Payer: MEDICARE

## 2020-10-22 DIAGNOSIS — R53.83 FATIGUE, UNSPECIFIED TYPE: ICD-10-CM

## 2020-10-22 LAB
COVID ORDER STATUS COVID19: NORMAL
SARS-COV-2 RNA RESP QL NAA+PROBE: NOTDETECTED
SPECIMEN SOURCE: NORMAL

## 2020-10-22 PROCEDURE — C9803 HOPD COVID-19 SPEC COLLECT: HCPCS

## 2020-10-22 PROCEDURE — U0003 INFECTIOUS AGENT DETECTION BY NUCLEIC ACID (DNA OR RNA); SEVERE ACUTE RESPIRATORY SYNDROME CORONAVIRUS 2 (SARS-COV-2) (CORONAVIRUS DISEASE [COVID-19]), AMPLIFIED PROBE TECHNIQUE, MAKING USE OF HIGH THROUGHPUT TECHNOLOGIES AS DESCRIBED BY CMS-2020-01-R: HCPCS

## 2020-10-22 NOTE — TELEPHONE ENCOUNTER
1. Caller Name: Audrey Aquino                Call Back Number:   [facility] PCP or Specialty Provider: Yes         2.  In the last two weeks, has the patient had any new or worsening symptoms (not explained by alternative diagnosis)? Yes, the patient reports the following COVID-19 consistent symptoms: chills, muscle pain or body aches and fatigue.    3.  Does patient have any comoribidities? None Asthma    4.  Has the patient traveled in the last 14 days OR had any known contact with someone who is suspected or confirmed to have COVID-19?  No.    5. Disposition: Advised to perform self care, monitor for worsening symptoms and to call back in 3 days if no improvement    Note routed to [facility] Provider: Provider action needed: Patient is requesting  Covid  nasoswab test order, please have MA contact patient that order is in place.

## 2020-10-22 NOTE — TELEPHONE ENCOUNTER
From: Audrey Aquino  To: Airam Ardon M.D.  Sent: 10/22/2020 9:58 AM PDT  Subject: Procedure Question    Please call in order for me for COVID test at [facility] so I can go today to get tested.   I returned from a week camping with extreme fatigue. I have also experienced some muscle aches and chilling but no fever. no other symptoms I live with my son and daughter-in-law who are both nurses. 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 We decided I should get tested and the health department said that I qualified. I would like to get tested through [employer]. Unfortunately both of my children were sent home from work until I have a negative test. Please respond as quickly as you can. I also called the [facility] RN line and they said this is the best way to get hold of you. thank you.

## 2020-10-23 ENCOUNTER — PATIENT MESSAGE (OUTPATIENT)
Dept: MEDICAL GROUP | Facility: MEDICAL CENTER | Age: 70
End: 2020-10-23

## 2020-10-24 ENCOUNTER — HOSPITAL ENCOUNTER (OUTPATIENT)
Dept: LAB | Facility: MEDICAL CENTER | Age: 70
End: 2020-10-24
Payer: MEDICARE

## 2020-10-24 PROCEDURE — U0003 INFECTIOUS AGENT DETECTION BY NUCLEIC ACID (DNA OR RNA); SEVERE ACUTE RESPIRATORY SYNDROME CORONAVIRUS 2 (SARS-COV-2) (CORONAVIRUS DISEASE [COVID-19]), AMPLIFIED PROBE TECHNIQUE, MAKING USE OF HIGH THROUGHPUT TECHNOLOGIES AS DESCRIBED BY CMS-2020-01-R: HCPCS

## 2020-10-24 PROCEDURE — C9803 HOPD COVID-19 SPEC COLLECT: HCPCS

## 2020-11-11 ENCOUNTER — PATIENT MESSAGE (OUTPATIENT)
Dept: SLEEP MEDICINE | Facility: MEDICAL CENTER | Age: 70
End: 2020-11-11

## 2020-11-12 NOTE — TELEPHONE ENCOUNTER
From: Audrey Aquino  To: THAIS Ayala  Sent: 11/11/2020 7:06 PM PST  Subject: Procedure Question    Due to COVID spike, I am not able to go in for my follow up sleep dentist appointment next week. so I need to reschedule the home sleep study to sometime in December, if, hopefully, COVID is under better control then. Shall I just call the office to do this?

## 2020-11-19 ENCOUNTER — APPOINTMENT (OUTPATIENT)
Dept: SLEEP MEDICINE | Facility: MEDICAL CENTER | Age: 70
End: 2020-11-19
Attending: NURSE PRACTITIONER
Payer: MEDICARE

## 2020-11-25 DIAGNOSIS — N95.2 POSTMENOPAUSE ATROPHIC VAGINITIS: ICD-10-CM

## 2020-11-25 DIAGNOSIS — F51.01 PRIMARY INSOMNIA: ICD-10-CM

## 2020-11-25 RX ORDER — ESTRADIOL 0.03 MG/D
FILM, EXTENDED RELEASE TRANSDERMAL
Qty: 24 PATCH | Refills: 3 | Status: SHIPPED | OUTPATIENT
Start: 2020-11-25 | End: 2021-01-06 | Stop reason: SDUPTHER

## 2020-12-20 DIAGNOSIS — Z20.822 EXPOSURE TO COVID-19 VIRUS: ICD-10-CM

## 2020-12-21 ENCOUNTER — HOSPITAL ENCOUNTER (OUTPATIENT)
Dept: LAB | Facility: MEDICAL CENTER | Age: 70
End: 2020-12-21
Attending: INTERNAL MEDICINE
Payer: MEDICARE

## 2020-12-21 DIAGNOSIS — Z20.822 EXPOSURE TO COVID-19 VIRUS: ICD-10-CM

## 2020-12-21 LAB — COVID ORDER STATUS COVID19: NORMAL

## 2020-12-21 PROCEDURE — U0003 INFECTIOUS AGENT DETECTION BY NUCLEIC ACID (DNA OR RNA); SEVERE ACUTE RESPIRATORY SYNDROME CORONAVIRUS 2 (SARS-COV-2) (CORONAVIRUS DISEASE [COVID-19]), AMPLIFIED PROBE TECHNIQUE, MAKING USE OF HIGH THROUGHPUT TECHNOLOGIES AS DESCRIBED BY CMS-2020-01-R: HCPCS

## 2020-12-21 PROCEDURE — C9803 HOPD COVID-19 SPEC COLLECT: HCPCS

## 2020-12-22 ENCOUNTER — PATIENT MESSAGE (OUTPATIENT)
Dept: MEDICAL GROUP | Facility: MEDICAL CENTER | Age: 70
End: 2020-12-22

## 2020-12-22 DIAGNOSIS — J45.991 COUGH VARIANT ASTHMA: ICD-10-CM

## 2020-12-22 LAB
SARS-COV-2 RNA RESP QL NAA+PROBE: NOTDETECTED
SPECIMEN SOURCE: NORMAL

## 2020-12-22 RX ORDER — BUDESONIDE AND FORMOTEROL FUMARATE DIHYDRATE 160; 4.5 UG/1; UG/1
2 AEROSOL RESPIRATORY (INHALATION) 2 TIMES DAILY
Qty: 1 EACH | Refills: 2 | Status: SHIPPED | OUTPATIENT
Start: 2020-12-22 | End: 2020-12-22 | Stop reason: SDUPTHER

## 2020-12-22 RX ORDER — BUDESONIDE AND FORMOTEROL FUMARATE DIHYDRATE 160; 4.5 UG/1; UG/1
2 AEROSOL RESPIRATORY (INHALATION) 2 TIMES DAILY
Qty: 1 EACH | Refills: 2 | Status: CANCELLED | OUTPATIENT
Start: 2020-12-22

## 2020-12-22 RX ORDER — BUDESONIDE AND FORMOTEROL FUMARATE DIHYDRATE 160; 4.5 UG/1; UG/1
2 AEROSOL RESPIRATORY (INHALATION) 2 TIMES DAILY
Qty: 1 EACH | Refills: 2 | Status: SHIPPED | OUTPATIENT
Start: 2020-12-22 | End: 2021-01-06 | Stop reason: SDUPTHER

## 2020-12-22 NOTE — TELEPHONE ENCOUNTER
From: Audrey Aquino  To: Airam Ardon M.D.  Sent: 2020 11:58 AM PST  Subject: Prescription Question    thank you. Happy Solstice season.  When should I schedule another telemedicine visit with you?  do you want to have a visit before the end of the year?   I want to wait to come in to the office until after I get the covid vaccine.      ----- Message -----   From:Airam Ardon M.D.   Sent:2020 11:55 AM PST   To:Audrey Aquino   Subject:RE: Prescription Question    Symbicort sent to the pharmacy      ----- Message -----   From:Audrey Aquino   Sent:2020 10:29 AM PST   To:Airam Ardon M.D.   Subject:Prescription Question    As you can see I have a neg Covid test. However, I have some kind of pretty severe respiratory illness , presumably aother virus, and my cough-equivalent asthma has kicked in making it really hard to sleep and function. I have been using the ADVAIR inhaler but the one I have is significantly . Unfortunately, without a pre-auth, this is not covered by my pharmacy insurance.  Would you call in Symbicort - I am assuming the stronger strength - to [facility] [place]? I will pick it up today.   (I usually use [facility] but I don't want to go into the building).   thank you.

## 2020-12-22 NOTE — TELEPHONE ENCOUNTER
From: Audrey Aquino  To: Airam Ardon M.D.  Sent: 12/22/2020 2:35 PM PST  Subject: Prescription Question    I'm not sure what to do.  [facility] [address] ?  they insist that the rx not there. can your MA call me? [phone] or call them directly?   I don't want the office to close without this happening,      ----- Message -----   From:Airam Ardon M.D.   Sent:12/22/2020 1:36 PM PST   To:Audrey Aquino   Subject:RE: Prescription Question    In my side it showed that the medication is already sent.They might have some delay in their system      ----- Message -----   From:Audrey Aquino   Sent:12/22/2020 1:27 PM PST   To:Airam Ardon M.D.   Subject:Prescription Question    I'm sorry but the pharmacy says that they do not have your rx for the inhaler.   can it be called in ? - the tech said that that is better for them and more efficient than an electronic rx - it seems that there can be a long delay with the electronic rx   Audrey rogers      ----- Message -----   From:Airam Ardon M.D.   Sent:12/22/2020 12:01 PM PST   To:Audrey Aquino   Subject:RE: Prescription Question    That is fine make an appointment after you get the vaccine      ----- Message -----   From:Audrey Aquino   Sent:12/22/2020 11:58 AM PST   To:Airam Ardon M.D.   Subject:Prescription Question    thank you. Happy Solstice season.  When should I schedule another telemedicine visit with you?  do you want to have a visit before the end of the year?   I want to wait to come in to the office until after I get the covid vaccine.      ----- Message -----   From:Airam Ardon M.D.   Sent:12/22/2020 11:55 AM PST   To:Audrey Aquino   Subject:RE: Prescription Question    Symbicort sent to the pharmacy      ----- Message -----   From:Audrey Aquino   Sent:12/22/2020 10:29 AM PST   To:Airam Ardon M.D.   Subject:Prescription Question    As you can see I have a neg Covid  test. However, I have some kind of pretty severe respiratory illness , presumably aother virus, and my cough-equivalent asthma has kicked in making it really hard to sleep and function. I have been using the ADVAIR inhaler but the one I have is significantly . Unfortunately, without a pre-auth, this is not covered by my pharmacy insurance.  Would you call in [facility] - I am assuming the stronger strength - to [facility] [address] [place] [address]? I will pick it up today.   (I usually use [facility] but I don't want to go into the building).   thank you.

## 2020-12-23 NOTE — TELEPHONE ENCOUNTER
From: Audrey Aquino  To: Airam Ardon M.D.  Sent: 12/22/2020 4:13 PM PST  Subject: Prescription Question    the prescription went to [facility] but I am too sick to go in there to pick it up. I just got a text from them.   it needs to be sent to [facility] [address] as I have asked before so I can go to the drive up.  I have tried to contact your MA, too, but only get her voicemail.  please help.   thank you.      ----- Message -----   From:Airam Ardon M.D.   Sent:12/22/2020 3:36 PM PST   To:Audrey Aquino   Subject:RE: Prescription Question    I sent the Symbicort again. Hopefully they will find it.  I do not know how I can help.  If they do not get it you might need to  the prescription from the clinic.      ----- Message -----   From:Audrey Aquino   Sent:12/22/2020 2:35 PM PST   To:Airam Ardon M.D.   Subject:Prescription Question    I'm not sure what to do.  [facility] [address] ?  they insist that the rx not there. can your MA call me? [phone] or call them directly?   I don't want the office to close without this happening,      ----- Message -----   From:Airam Ardon M.D.   Sent:12/22/2020 1:36 PM PST   To:Audrey Aquino   Subject:RE: Prescription Question    In my side it showed that the medication is already sent.They might have some delay in their system      ----- Message -----   From:Audrey Aquino   Sent:12/22/2020 1:27 PM PST   To:Airam Ardon M.D.   Subject:Prescription Question    I'm sorry but the pharmacy says that they do not have your rx for the inhaler.   can it be called in ? - the tech said that that is better for them and more efficient than an electronic rx - it seems that there can be a long delay with the electronic rx   Audrey rogers      ----- Message -----   From:Airam Ardon M.D.   Sent:12/22/2020 12:01 PM PST   To:Audrey Aquino   Subject:RE: Prescription Question    That is fine make an  appointment after you get the vaccine      ----- Message -----   From:Audrey Aquino   Sent:2020 11:58 AM PST   To:Airam Ardon M.D.   Subject:Prescription Question    thank you. Happy Solstice season.  When should I schedule another telemedicine visit with you?  do you want to have a visit before the end of the year?   I want to wait to come in to the office until after I get the covid vaccine.      ----- Message -----   From:Airam Ardon M.D.   Sent:2020 11:55 AM PST   To:Audrey Aquino   Subject:RE: Prescription Question    Symbicort sent to the pharmacy      ----- Message -----   From:Audrey Aquino   Sent:2020 10:29 AM PST   To:Airam Ardon M.D.   Subject:Prescription Question    As you can see I have a neg Covid test. However, I have some kind of pretty severe respiratory illness , presumably aother virus, and my cough-equivalent asthma has kicked in making it really hard to sleep and function. I have been using the ADVAIR inhaler but the one I have is significantly . Unfortunately, without a pre-auth, this is not covered by my pharmacy insurance.  Would you call in Symbicort - I am assuming the stronger strength - to [facility]? I will pick it up today.   (I usually use [facility] but I don't want to go into the building).   thank you.

## 2020-12-30 ENCOUNTER — APPOINTMENT (OUTPATIENT)
Dept: SLEEP MEDICINE | Facility: MEDICAL CENTER | Age: 70
End: 2020-12-30
Attending: NURSE PRACTITIONER
Payer: MEDICARE

## 2021-01-06 ENCOUNTER — PATIENT MESSAGE (OUTPATIENT)
Dept: MEDICAL GROUP | Facility: MEDICAL CENTER | Age: 71
End: 2021-01-06

## 2021-01-06 DIAGNOSIS — E78.1 HYPERTRIGLYCERIDEMIA: ICD-10-CM

## 2021-01-06 DIAGNOSIS — F51.01 PRIMARY INSOMNIA: ICD-10-CM

## 2021-01-06 DIAGNOSIS — J45.991 COUGH VARIANT ASTHMA: ICD-10-CM

## 2021-01-06 DIAGNOSIS — N95.2 POSTMENOPAUSE ATROPHIC VAGINITIS: ICD-10-CM

## 2021-01-06 RX ORDER — BUDESONIDE AND FORMOTEROL FUMARATE DIHYDRATE 160; 4.5 UG/1; UG/1
2 AEROSOL RESPIRATORY (INHALATION) 2 TIMES DAILY
Qty: 1 EACH | Refills: 2 | Status: SHIPPED | OUTPATIENT
Start: 2021-01-06

## 2021-01-06 RX ORDER — ESTRADIOL 0.03 MG/D
FILM, EXTENDED RELEASE TRANSDERMAL
Qty: 24 PATCH | Refills: 3 | Status: SHIPPED | OUTPATIENT
Start: 2021-01-06 | End: 2022-01-14

## 2021-01-06 RX ORDER — FENOFIBRATE 145 MG/1
145 TABLET, COATED ORAL DAILY
Qty: 90 TAB | Refills: 2 | Status: SHIPPED | OUTPATIENT
Start: 2021-01-06 | End: 2021-11-03

## 2021-01-26 ENCOUNTER — APPOINTMENT (OUTPATIENT)
Dept: SLEEP MEDICINE | Facility: MEDICAL CENTER | Age: 71
End: 2021-01-26
Attending: NURSE PRACTITIONER
Payer: COMMERCIAL

## 2021-02-12 ENCOUNTER — PATIENT MESSAGE (OUTPATIENT)
Dept: MEDICAL GROUP | Facility: MEDICAL CENTER | Age: 71
End: 2021-02-12

## 2021-02-12 DIAGNOSIS — R53.83 FATIGUE, UNSPECIFIED TYPE: ICD-10-CM

## 2021-02-12 DIAGNOSIS — M25.50 ARTHRALGIA, UNSPECIFIED JOINT: ICD-10-CM

## 2021-02-12 DIAGNOSIS — E78.1 HYPERTRIGLYCERIDEMIA: ICD-10-CM

## 2021-03-03 DIAGNOSIS — Z23 NEED FOR VACCINATION: ICD-10-CM

## 2021-03-22 ENCOUNTER — HOSPITAL ENCOUNTER (OUTPATIENT)
Dept: LAB | Facility: MEDICAL CENTER | Age: 71
End: 2021-03-22
Attending: FAMILY MEDICINE
Payer: MEDICARE

## 2021-03-22 DIAGNOSIS — M25.50 ARTHRALGIA, UNSPECIFIED JOINT: ICD-10-CM

## 2021-03-22 DIAGNOSIS — E78.1 HYPERTRIGLYCERIDEMIA: ICD-10-CM

## 2021-03-22 DIAGNOSIS — R53.83 FATIGUE, UNSPECIFIED TYPE: ICD-10-CM

## 2021-03-22 LAB
ALBUMIN SERPL BCP-MCNC: 4.3 G/DL (ref 3.2–4.9)
ALBUMIN/GLOB SERPL: 1.7 G/DL
ALP SERPL-CCNC: 80 U/L (ref 30–99)
ALT SERPL-CCNC: 26 U/L (ref 2–50)
ANION GAP SERPL CALC-SCNC: 9 MMOL/L (ref 7–16)
AST SERPL-CCNC: 28 U/L (ref 12–45)
BASOPHILS # BLD AUTO: 0.5 % (ref 0–1.8)
BASOPHILS # BLD: 0.03 K/UL (ref 0–0.12)
BILIRUB SERPL-MCNC: 0.5 MG/DL (ref 0.1–1.5)
BUN SERPL-MCNC: 15 MG/DL (ref 8–22)
CALCIUM SERPL-MCNC: 9.7 MG/DL (ref 8.5–10.5)
CHLORIDE SERPL-SCNC: 108 MMOL/L (ref 96–112)
CHOLEST SERPL-MCNC: 197 MG/DL (ref 100–199)
CO2 SERPL-SCNC: 27 MMOL/L (ref 20–33)
CREAT SERPL-MCNC: 1.06 MG/DL (ref 0.5–1.4)
EOSINOPHIL # BLD AUTO: 0.16 K/UL (ref 0–0.51)
EOSINOPHIL NFR BLD: 2.8 % (ref 0–6.9)
ERYTHROCYTE [DISTWIDTH] IN BLOOD BY AUTOMATED COUNT: 43.8 FL (ref 35.9–50)
FASTING STATUS PATIENT QL REPORTED: NORMAL
GLOBULIN SER CALC-MCNC: 2.5 G/DL (ref 1.9–3.5)
GLUCOSE SERPL-MCNC: 88 MG/DL (ref 65–99)
HCT VFR BLD AUTO: 44.8 % (ref 37–47)
HDLC SERPL-MCNC: 60 MG/DL
HGB BLD-MCNC: 14.7 G/DL (ref 12–16)
IMM GRANULOCYTES # BLD AUTO: 0.04 K/UL (ref 0–0.11)
IMM GRANULOCYTES NFR BLD AUTO: 0.7 % (ref 0–0.9)
LDLC SERPL CALC-MCNC: 118 MG/DL
LYMPHOCYTES # BLD AUTO: 2.38 K/UL (ref 1–4.8)
LYMPHOCYTES NFR BLD: 42.1 % (ref 22–41)
MCH RBC QN AUTO: 29.1 PG (ref 27–33)
MCHC RBC AUTO-ENTMCNC: 32.8 G/DL (ref 33.6–35)
MCV RBC AUTO: 88.5 FL (ref 81.4–97.8)
MONOCYTES # BLD AUTO: 0.47 K/UL (ref 0–0.85)
MONOCYTES NFR BLD AUTO: 8.3 % (ref 0–13.4)
NEUTROPHILS # BLD AUTO: 2.57 K/UL (ref 2–7.15)
NEUTROPHILS NFR BLD: 45.6 % (ref 44–72)
NRBC # BLD AUTO: 0 K/UL
NRBC BLD-RTO: 0 /100 WBC
PLATELET # BLD AUTO: 360 K/UL (ref 164–446)
PMV BLD AUTO: 9.9 FL (ref 9–12.9)
POTASSIUM SERPL-SCNC: 4.9 MMOL/L (ref 3.6–5.5)
PROT SERPL-MCNC: 6.8 G/DL (ref 6–8.2)
RBC # BLD AUTO: 5.06 M/UL (ref 4.2–5.4)
SODIUM SERPL-SCNC: 144 MMOL/L (ref 135–145)
TRIGL SERPL-MCNC: 97 MG/DL (ref 0–149)
URATE SERPL-MCNC: 5.2 MG/DL (ref 1.9–8.2)
WBC # BLD AUTO: 5.7 K/UL (ref 4.8–10.8)

## 2021-03-22 PROCEDURE — 80061 LIPID PANEL: CPT

## 2021-03-22 PROCEDURE — 80053 COMPREHEN METABOLIC PANEL: CPT

## 2021-03-22 PROCEDURE — 36415 COLL VENOUS BLD VENIPUNCTURE: CPT

## 2021-03-22 PROCEDURE — 84443 ASSAY THYROID STIM HORMONE: CPT

## 2021-03-22 PROCEDURE — 85025 COMPLETE CBC W/AUTO DIFF WBC: CPT

## 2021-03-22 PROCEDURE — 84550 ASSAY OF BLOOD/URIC ACID: CPT

## 2021-03-23 ENCOUNTER — HOME STUDY (OUTPATIENT)
Dept: SLEEP MEDICINE | Facility: MEDICAL CENTER | Age: 71
End: 2021-03-23
Payer: MEDICARE

## 2021-03-23 DIAGNOSIS — G47.33 OBSTRUCTIVE SLEEP APNEA SYNDROME: ICD-10-CM

## 2021-03-23 LAB — TSH SERPL DL<=0.005 MIU/L-ACNC: 4.06 UIU/ML (ref 0.38–5.33)

## 2021-03-23 PROCEDURE — G0399 HOME SLEEP TEST/TYPE 3 PORTA: HCPCS | Performed by: INTERNAL MEDICINE

## 2021-03-24 ENCOUNTER — TELEPHONE (OUTPATIENT)
Dept: SLEEP MEDICINE | Facility: MEDICAL CENTER | Age: 71
End: 2021-03-24

## 2021-03-24 NOTE — TELEPHONE ENCOUNTER
Patient wanted to know if she was still snoring.   According to the data, very light movement was seen in the snore channel, which might indicate mild snoring.

## 2021-03-25 ENCOUNTER — PATIENT MESSAGE (OUTPATIENT)
Dept: MEDICAL GROUP | Facility: MEDICAL CENTER | Age: 71
End: 2021-03-25

## 2021-03-28 SDOH — ECONOMIC STABILITY: HOUSING INSECURITY: IN THE LAST 12 MONTHS, HOW MANY PLACES HAVE YOU LIVED?: 1

## 2021-03-28 SDOH — ECONOMIC STABILITY: INCOME INSECURITY: IN THE LAST 12 MONTHS, WAS THERE A TIME WHEN YOU WERE NOT ABLE TO PAY THE MORTGAGE OR RENT ON TIME?: NO

## 2021-03-28 SDOH — ECONOMIC STABILITY: TRANSPORTATION INSECURITY
IN THE PAST 12 MONTHS, HAS THE LACK OF TRANSPORTATION KEPT YOU FROM MEDICAL APPOINTMENTS OR FROM GETTING MEDICATIONS?: NO

## 2021-03-28 SDOH — HEALTH STABILITY: PHYSICAL HEALTH: ON AVERAGE, HOW MANY MINUTES DO YOU ENGAGE IN EXERCISE AT THIS LEVEL?: 30 MINUTES

## 2021-03-28 SDOH — ECONOMIC STABILITY: HOUSING INSECURITY
IN THE LAST 12 MONTHS, WAS THERE A TIME WHEN YOU DID NOT HAVE A STEADY PLACE TO SLEEP OR SLEPT IN A SHELTER (INCLUDING NOW)?: NO

## 2021-03-28 SDOH — HEALTH STABILITY: MENTAL HEALTH
STRESS IS WHEN SOMEONE FEELS TENSE, NERVOUS, ANXIOUS, OR CAN'T SLEEP AT NIGHT BECAUSE THEIR MIND IS TROUBLED. HOW STRESSED ARE YOU?: ONLY A LITTLE

## 2021-03-28 SDOH — HEALTH STABILITY: PHYSICAL HEALTH: ON AVERAGE, HOW MANY DAYS PER WEEK DO YOU ENGAGE IN MODERATE TO STRENUOUS EXERCISE (LIKE A BRISK WALK)?: 5 DAYS

## 2021-03-28 SDOH — ECONOMIC STABILITY: TRANSPORTATION INSECURITY
IN THE PAST 12 MONTHS, HAS LACK OF RELIABLE TRANSPORTATION KEPT YOU FROM MEDICAL APPOINTMENTS, MEETINGS, WORK OR FROM GETTING THINGS NEEDED FOR DAILY LIVING?: NO

## 2021-03-28 ASSESSMENT — SOCIAL DETERMINANTS OF HEALTH (SDOH)
HOW HARD IS IT FOR YOU TO PAY FOR THE VERY BASICS LIKE FOOD, HOUSING, MEDICAL CARE, AND HEATING?: NOT HARD AT ALL
HOW OFTEN DO YOU GET TOGETHER WITH FRIENDS OR RELATIVES?: MORE THAN THREE TIMES A WEEK
HOW OFTEN DO YOU ATTENT MEETINGS OF THE CLUB OR ORGANIZATION YOU BELONG TO?: MORE THAN 4 TIMES PER YEAR
HOW OFTEN DO YOU HAVE A DRINK CONTAINING ALCOHOL: 2-3 TIMES A WEEK
WITHIN THE PAST 12 MONTHS, YOU WORRIED THAT YOUR FOOD WOULD RUN OUT BEFORE YOU GOT THE MONEY TO BUY MORE: NEVER TRUE
IN A TYPICAL WEEK, HOW MANY TIMES DO YOU TALK ON THE PHONE WITH FAMILY, FRIENDS, OR NEIGHBORS?: MORE THAN THREE TIMES A WEEK
HOW OFTEN DO YOU ATTEND CHURCH OR RELIGIOUS SERVICES?: MORE THAN 4 TIMES PER YEAR
WITHIN THE PAST 12 MONTHS, THE FOOD YOU BOUGHT JUST DIDN'T LAST AND YOU DIDN'T HAVE MONEY TO GET MORE: NEVER TRUE
HOW OFTEN DO YOU HAVE SIX OR MORE DRINKS ON ONE OCCASION: NEVER
HOW MANY DRINKS CONTAINING ALCOHOL DO YOU HAVE ON A TYPICAL DAY WHEN YOU ARE DRINKING: 1 OR 2
DO YOU BELONG TO ANY CLUBS OR ORGANIZATIONS SUCH AS CHURCH GROUPS UNIONS, FRATERNAL OR ATHLETIC GROUPS, OR SCHOOL GROUPS?: YES

## 2021-03-29 ENCOUNTER — OFFICE VISIT (OUTPATIENT)
Dept: MEDICAL GROUP | Facility: MEDICAL CENTER | Age: 71
End: 2021-03-29
Payer: MEDICARE

## 2021-03-29 VITALS
DIASTOLIC BLOOD PRESSURE: 64 MMHG | BODY MASS INDEX: 27.42 KG/M2 | HEIGHT: 62 IN | HEART RATE: 53 BPM | TEMPERATURE: 97.2 F | WEIGHT: 149 LBS | OXYGEN SATURATION: 99 % | RESPIRATION RATE: 16 BRPM | SYSTOLIC BLOOD PRESSURE: 102 MMHG

## 2021-03-29 DIAGNOSIS — N95.2 VAGINAL ATROPHY: ICD-10-CM

## 2021-03-29 DIAGNOSIS — E78.1 HYPERTRIGLYCERIDEMIA: ICD-10-CM

## 2021-03-29 DIAGNOSIS — Z00.00 MEDICARE ANNUAL WELLNESS VISIT, INITIAL: ICD-10-CM

## 2021-03-29 DIAGNOSIS — Z00.00 HEALTHCARE MAINTENANCE: ICD-10-CM

## 2021-03-29 DIAGNOSIS — J45.20 MILD INTERMITTENT ASTHMA WITHOUT COMPLICATION: ICD-10-CM

## 2021-03-29 DIAGNOSIS — B35.1 ONYCHOMYCOSIS: ICD-10-CM

## 2021-03-29 DIAGNOSIS — G47.33 OBSTRUCTIVE SLEEP APNEA SYNDROME: Chronic | ICD-10-CM

## 2021-03-29 PROCEDURE — G0438 PPPS, INITIAL VISIT: HCPCS | Performed by: FAMILY MEDICINE

## 2021-03-29 ASSESSMENT — PATIENT HEALTH QUESTIONNAIRE - PHQ9: CLINICAL INTERPRETATION OF PHQ2 SCORE: 0

## 2021-03-29 ASSESSMENT — ACTIVITIES OF DAILY LIVING (ADL): BATHING_REQUIRES_ASSISTANCE: 0

## 2021-03-29 ASSESSMENT — ENCOUNTER SYMPTOMS: GENERAL WELL-BEING: GOOD

## 2021-03-29 ASSESSMENT — FIBROSIS 4 INDEX: FIB4 SCORE: 1.05

## 2021-03-29 NOTE — PROGRESS NOTES
Chief Complaint   Patient presents with   • Annual Exam       HPI:  Audrey Aquino is a 70 y.o. here for Medicare Annual Wellness Visit     Medicare annual wellness visit, initial  Preventive measures and chronic medical issues reviewed.    Hypertriglyceridemia  Patient has chronic hypertrophy of triglyceridemia, was started on fenofibrate 145 mg, most recent triglyceride level was normal as follows:   Ref Range & Units 7 d ago   Cholesterol,Tot 100 - 199 mg/dL 197    Triglycerides 0 - 149 mg/dL 97    HDL >=40 mg/dL 60    LDL <100 mg/dL 118High          Obstructive sleep apnea  Patient was diagnosed with associated obstructive sleep apnea many years ago however she has been using CPAP, denies any morning headache, and daytime somnolence.      Cough variant asthma  Patient develops cough once in a while only Advair has been helping when she has not been taking it in a regular basis.  Currently asymptomatic, denies any cough or shortness of breath.       Vaginal atrophy  Patient has been on estradiol patches biweekly.  Has been helping.          Patient Active Problem List    Diagnosis Date Noted   • Hypertriglyceridemia 04/27/2020   • Gingivitis 09/18/2019   • Foraminal stenosis of cervical region 06/03/2019   • Trigger ring finger of right hand 12/13/2018   • Cough variant asthma 10/16/2018   • Intractable migraine without aura and without status migrainosus 10/16/2018   • Primary insomnia 10/16/2018   • Bilateral patellofemoral syndrome 10/16/2018   • Hx of total hysterectomy 10/16/2018   • Vaginal atrophy 10/16/2018   • Obstructive sleep apnea syndrome 09/12/2014   • History of radial keratotomy 12/08/2010   • Acquired absence of other genital organ(s) 12/22/2008   • Mixed hyperlipidemia 09/21/2007   • Diverticulosis of colon 10/20/2006   • Allergic rhinitis 01/15/2003       Current Outpatient Medications   Medication Sig Dispense Refill   • budesonide-formoterol (SYMBICORT) 160-4.5 MCG/ACT Aerosol Inhale 2 Puffs 2  Times a Day. 1 Each 2   • Estradiol 0.025 MG/24HR PATCH BIWEEKLY APPLY 1 PATCH TWICE WEEKLY AS DIRECTED 24 Patch 3   • fenofibrate (TRICOR) 145 MG Tab Take 1 Tab by mouth every day. 90 Tab 2   • estradiol (VAGIFEM) 10 MCG Tab insert 1 tablet vaginally once daily as directed 30 Tab 2   • zolpidem (AMBIEN) 5 MG Tab Take 5 mg by mouth at bedtime as needed for Sleep.     • Coenzyme Q10 (COQ10) 100 MG Cap Take  by mouth.     • Multiple Vitamins-Minerals (DRY EYE FORMULA) Cap Take  by mouth.     • CALCIUM-MAGNESIUM PO Take  by mouth.       No current facility-administered medications for this visit.          Current supplements as per medication list.     Allergies: Codeine and Penicillins    Current social contact/activities:      She  reports that she has never smoked. She has never used smokeless tobacco. She reports current alcohol use. She reports current drug use.  Counseling given: Not Answered      DPA/Advanced Directive:  Patient has Advanced Directive on file.     ROS:    Gait: Uses no assistive device  Ostomy: No  Other tubes: No  Amputations: No  Chronic oxygen use: No  Last eye exam: 3/2020  Wears hearing aids: No   : Denies any urinary leakage during the last 6 months    Screening:    Depression Screening    Little interest or pleasure in doing things?  0 - not at all  Feeling down, depressed , or hopeless? 0 - not at all  Patient Health Questionnaire Score: 0     If depressive symptoms identified deferred to follow up visit unless specifically addressed in assessment and plan.    Interpretation of PHQ-9 Total Score   Score Severity   1-4 No Depression   5-9 Mild Depression   10-14 Moderate Depression   15-19 Moderately Severe Depression   20-27 Severe Depression    Screening for Cognitive Impairment    Three Minute Recall (river, swetha, finger) 2/3    Travis clock face with all 12 numbers and set the hands to show 10 past 11.  Yes    Cognitive concerns identified deferred for follow up unless specifically  addressed in assessment and plan.    Fall Risk Assessment    Has the patient had two or more falls in the last year or any fall with injury in the last year?  No    Safety Assessment    Throw rugs on floor.  No  Handrails on all stairs.  No  Good lighting in all hallways.  Yes  Difficulty hearing.  No  Patient counseled about all safety risks that were identified.    Functional Assessment ADLs    Are there any barriers preventing you from cooking for yourself or meeting nutritional needs?  No.    Are there any barriers preventing you from driving safely or obtaining transportation?  No.    Are there any barriers preventing you from using a telephone or calling for help?  No.    Are there any barriers preventing you from shopping?  No.    Are there any barriers preventing you from taking care of your own finances?  No.    Are there any barriers preventing you from managing your medications?  No.    Are there any barriers preventing you from showering, bathing or dressing yourself?  No.    Are you currently engaging in any exercise or physical activity?  Yes.     What is your perception of your health?  Good.      Health Maintenance Summary                HEPATITIS C SCREENING Overdue 1951     PAP SMEAR Overdue 6/24/1972     MAMMOGRAM Overdue 3/5/2020      Done 3/5/2019 MA-DIAGNOSTIC MAMMO-UNILAT     Patient has more history with this topic...    BONE DENSITY Next Due 8/29/2021      Done 8/29/2016     COLONOSCOPY Next Due 11/7/2028      Not specified 11/7/2018     IMM DTaP/Tdap/Td Vaccine Next Due 5/17/2029      Done 5/17/2019 Imm Admin: Tdap Vaccine     Patient has more history with this topic...          Patient Care Team:  Airam Ardon M.D. as PCP - General (Geriatrics)  [facility]  as Respiratory Therapist        Social History     Tobacco Use   • Smoking status: Never Smoker   • Smokeless tobacco: Never Used   Substance Use Topics   • Alcohol use: Yes     Comment: 2-3 drinks a week    • Drug use:  Yes     Comment: indica tablets for sleep     Family History   Problem Relation Age of Onset   • Cancer Mother         breast     She  has a past medical history of Asthma, Dry eyes, Frequent headaches, Gingivitis, History of hormone therapy, Hyperlipidemia, Knee pain, Overweight, Shoulder injury, and Sleep disorder.   Past Surgical History:   Procedure Laterality Date   • EYE SURGERY      right cataract remobal/lens placement       Exam:   There were no vitals taken for this visit. There is no height or weight on file to calculate BMI.    Hearing excellent.    Dentition good  Alert, oriented in no acute distress.  Eye contact is good, speech goal directed, affect calm    Assessment and Plan. The following treatment and monitoring plan is recommended:    70 y.o. female     1. Medicare annual wellness visit, initial  Preventive measures and chronic medical issues reviewed.    - Initial Annual Wellness Visit - Includes PPPS ()    2. Hypertriglyceridemia  Most recent triglyceride level was normal.  Continue fenofibrate 145 mg daily.    - Initial Annual Wellness Visit - Includes PPPS ()    3. Obstructive sleep apnea syndrome  Has been doing fine with CPAP.    - Initial Annual Wellness Visit - Includes PPPS ()    4. Mild intermittent asthma without complication  Currently asymptomatic.  Patient stated that only Advair has helped her, and she has been taking it only once in a while.    - Initial Annual Wellness Visit - Includes PPPS ()    5. Vaginal atrophy  Vaginal cream has been helping.    - Initial Annual Wellness Visit - Includes PPPS ()    6. Healthcare maintenance  Vaccinations are up to date.    - Initial Annual Wellness Visit - Includes PPPS ()      Services suggested: No services needed at this time  Health Care Screening: Age-appropriate preventive services recommended by USPTF and ACIP covered by Medicare were discussed today. Services ordered if indicated and agreed upon by the  patient.  Referrals offered: Community-based lifestyle interventions to reduce health risks and promote self-management and wellness, fall prevention, nutrition, physical activity, tobacco-use cessation, weight loss, and mental health services as per orders if indicated.    Discussion today about general wellness and lifestyle habits:    · Prevent falls and reduce trip hazards; Cautioned about securing or removing rugs.  · Have a working fire alarm and carbon monoxide detector;   · Engage in regular physical activity and social activities     Follow-up: No follow-ups on file.

## 2021-03-30 ENCOUNTER — TELEPHONE (OUTPATIENT)
Dept: SLEEP MEDICINE | Facility: MEDICAL CENTER | Age: 71
End: 2021-03-30

## 2021-03-30 NOTE — PROCEDURES
Interpretation:    This home sleep test was performed on 3/23/2021 using a [employer] NightOne recording device. The device has 3 sensors (effort belt, cannula and oximeter) and a built-in body position sensor that provide seven channels of data (body position, pressure flow, snore, respiratory effort, SpO2, pleth and pulse rate).  The effort belt utilizes respiratory inductive plethysmography technology.      The total recording time was 433.8 minutes, the time in bed was 433.8 minutes, and the monitoring time was 433.3 minutes.    The device recorded 0 central apneas, 5 obstructive apneas, 0 mixed apneas, 26 hypopneas, 31 apneas and hypopneas, and 0 RERAs.  The TELLY (respiratory event index which is a surrogate for the AHI) was 4.3 which is normal.  The OAI (obstructive apnea index) was 0.7.  The CLARENCE (the central apnea index) was 0.0.  The supine TELLY was not applicable as the patient did not sleep in the supine position.  Most of the respiratory events occurred in the right position.    The patient experienced 32 desaturations and the oxygen desaturation index was 4.5.  During sleep the average saturation was 90%, the da saturation was 83%, and the highest saturation was 97%.  The patient spent 42.4% of TIB with saturations less than 90%.      The mean heart rate during sleep was 50 bpm, the maximum heart rate during sleep was 77 bpm, and the minimum heart rate during sleep was 41 bpm.    The patient experienced 31 snoring episodes.  The percentage of snoring was 1.0%.        Assessment:    No significant sleep apnea - TELLY 4.3.  A home sleep study may underestimate the degree of sleep apnea as total sleep time is unknown and monitoring time is used instead.  Persistent nocturnal desaturation - da saturation 83% - less than 90% for 42.4% of the TIB  No significant snorin total snoring episodes/percentage of snoring 1.0%        Recommendation:    Clinical correlation is needed.  There is no  indication for positive airway pressure therapy.  If the suspicion of sleep apnea is high recommend an attended in lab study.  The patient may be a candidate for nocturnal oxygen supplementation.

## 2021-03-30 NOTE — TELEPHONE ENCOUNTER
----- Message from THAIS Ayala sent at 3/30/2021  1:53 PM PDT -----  Patient needs f/u visit to review; ongoing low oxygen levels using dental device but apneas are improved

## 2021-03-30 NOTE — TELEPHONE ENCOUNTER
Will reinvestigate with DME to find out what can be done about MDCR pt's with dental devices and o2     Pt has [facility] so I will start with them

## 2021-03-30 NOTE — TELEPHONE ENCOUNTER
----- Message from THAIS Ayala sent at 3/30/2021 12:04 PM PDT -----  HST with DENTAL DEVICE noted well controlled AHI but persistent low oxygen levels. What testing do we need to order to obtain supplemental O2 at night with dental device?

## 2021-03-30 NOTE — TELEPHONE ENCOUNTER
lvm to notify pt of message below. Advised she call back to schedule appt to go over results asap.

## 2021-04-01 NOTE — TELEPHONE ENCOUNTER
I have requested Fili reach out to his contacts through out [facility] to find out if there is any sort of process of getting pts on o2 when being treated with dental appliance. We both agreed that since Medicare pays for the dental device, there must be a process if it does not maintain sats but we can prove it resolves the AHI.   Current HSS shows resolution of AHI so he is going to look into any possible options

## 2021-04-02 ENCOUNTER — PATIENT MESSAGE (OUTPATIENT)
Dept: SLEEP MEDICINE | Facility: MEDICAL CENTER | Age: 71
End: 2021-04-02

## 2021-04-09 ENCOUNTER — TELEMEDICINE (OUTPATIENT)
Dept: SLEEP MEDICINE | Facility: MEDICAL CENTER | Age: 71
End: 2021-04-09
Payer: MEDICARE

## 2021-04-09 VITALS — BODY MASS INDEX: 27.42 KG/M2 | HEIGHT: 62 IN | WEIGHT: 149 LBS

## 2021-04-09 DIAGNOSIS — J45.991 COUGH VARIANT ASTHMA: ICD-10-CM

## 2021-04-09 DIAGNOSIS — G47.34 NOCTURNAL HYPOXIA: ICD-10-CM

## 2021-04-09 DIAGNOSIS — F51.01 PRIMARY INSOMNIA: ICD-10-CM

## 2021-04-09 DIAGNOSIS — G47.33 OBSTRUCTIVE SLEEP APNEA SYNDROME: Chronic | ICD-10-CM

## 2021-04-09 PROCEDURE — 99214 OFFICE O/P EST MOD 30 MIN: CPT | Mod: 95,CR | Performed by: NURSE PRACTITIONER

## 2021-04-09 ASSESSMENT — FIBROSIS 4 INDEX: FIB4 SCORE: 1.05

## 2021-04-09 NOTE — PROGRESS NOTES
Virtual Visit: Established Patient   This visit was conducted via Zoom using secure and encrypted videoconferencing technology. The patient was in a private location in the state [facility].    The patient's identity was confirmed and verbal consent was obtained for this virtual visit.  Given the importance of social distancing and other strategies recommended to reduce the risk of COVID-19 transmission, I am providing medical care to this patient via audio/video visit in place of an in person visit at the request of the patient.  Subjective:   CC:   Chief Complaint   Patient presents with   • Follow-Up     GÓMEZ-Last seen 05/07/2020       Audrey Aquino is a 70 y.o. female presenting for evaluation and management of GÓMEZ. 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 PMH includes asthma, migraine, primary insomnia, allergic rhinitis, mixed hyperlipidemia, diverticulosis, hypertryglyceridemia, never smoker.     Patient was originally diagnosed with GÓMEZ at [place], [place] in 2014 with an AHI of 11.7.  Since then she has been prescribed a mandibular device for treatment.  She has had repeat sleep studies to show effectiveness of her device. She opted to use autoCPAP in 10/2019 after she felt fatigued in spite of using the dental appliance. She underwent a repeat HSS with the dental appliance in March of this year. Compliance report from 3/10/21-4/8/21 was downloaded and reviewed with the patient which showed autoCPAP 5-15 cmH2O, 80% compliance, 5 hrs 7 min use (80% compliance), AHI of 0.4. She is tolerating the pressure and mask well, and has had great success with the dreamwear nasal mask. She goes to bed between 10:30 pm-12 am and wakes up at 7 am. She goes to bed each night wearing the CPAP, but around 4 am she wakes up and has trouble going back to sleep so she will use the dental appliance which allows her to fall asleep. She uses CBD to help with insomnia, and very sparingly Indica  "marijuana tablets which allow her to sleep through the night for at least 7 hrs. She does have a habit of staying up and watching TV which she know she needs to break. She does nap in the afternoon for about 10-20 min due to some fatigue. She would like to obtain a travel CPAP because she goes camping often. Sometimes when she gets an URI it exacerbates her asthma and she develops a significant productive cough; therefore she will be purchasing a pulse ox to have on hand at home to ensure she maintains an adequate O2 saturation during that time. She also uses Symbicort which is effective. She denies any new health problems or medications.        Sleep Study History:  HSS from 3/23/21 with dental appliance in place indicated no significant sleep apnea - TELLY 4.3. A home sleep study may underestimate the degree of sleep apnea as total sleep time is unknown and monitoring time is used instead.  Persistent nocturnal desaturation - da saturation 83% - less than 90% for 42.4% of the TIB. No significant snorin total snoring episodes/percentage of snoring 1.0%.     HSS from 20 indicated moderate to severe GÓMEZ with an overall a TELLY of 29/h with O2 da of 74%.  Patient was less than 90% for 33% of study time.  Supine position TELLY was 49/h.  Average heart rate is 50 bpm.    For reevaluation a HST with her mandibular device was completed in 2019 that showed an TELLY of 5.8 and low oxygenation of 82%.     She was originally diagnosed with GÓMEZ at [place] in [place] in  with an AHI of 11.7.    ROS   Denies any recent fevers or chills. No nausea or vomiting. No chest pains or shortness of breath.     Allergies   Allergen Reactions   • Codeine      \"Makes me sick\"   • Penicillins      Never taken but has family history of anaphylaxis death       Current medicines (including changes today)  Current Outpatient Medications   Medication Sig Dispense Refill   • budesonide-formoterol (SYMBICORT) 160-4.5 MCG/ACT Aerosol Inhale " "2 Puffs 2 Times a Day. 1 Each 2   • Estradiol 0.025 MG/24HR PATCH BIWEEKLY APPLY 1 PATCH TWICE WEEKLY AS DIRECTED 24 Patch 3   • fenofibrate (TRICOR) 145 MG Tab Take 1 Tab by mouth every day. 90 Tab 2   • estradiol (VAGIFEM) 10 MCG Tab insert 1 tablet vaginally once daily as directed 30 Tab 2   • Coenzyme Q10 (COQ10) 100 MG Cap Take  by mouth.     • Multiple Vitamins-Minerals (DRY EYE FORMULA) Cap Take  by mouth.     • CALCIUM-MAGNESIUM PO Take  by mouth.       No current facility-administered medications for this visit.       Patient Active Problem List    Diagnosis Date Noted   • Hypertriglyceridemia 04/27/2020   • Gingivitis 09/18/2019   • Foraminal stenosis of cervical region 06/03/2019   • Trigger ring finger of right hand 12/13/2018   • Cough variant asthma 10/16/2018   • Intractable migraine without aura and without status migrainosus 10/16/2018   • Primary insomnia 10/16/2018   • Bilateral patellofemoral syndrome 10/16/2018   • Hx of total hysterectomy 10/16/2018   • Vaginal atrophy 10/16/2018   • Obstructive sleep apnea syndrome 09/12/2014   • History of radial keratotomy 12/08/2010   • Acquired absence of other genital organ(s) 12/22/2008   • Mixed hyperlipidemia 09/21/2007   • Diverticulosis of colon 10/20/2006   • Allergic rhinitis 01/15/2003       Family History   Problem Relation Age of Onset   • Cancer Mother         breast       She  has a past medical history of Asthma, Dry eyes, Frequent headaches, Gingivitis, History of hormone therapy, Hyperlipidemia, Knee pain, Overweight, Shoulder injury, and Sleep disorder.  She  has a past surgical history that includes eye surgery.       Objective:   Ht 1.575 m (5' 2\")   Wt 67.6 kg (149 lb)   BMI 27.25 kg/m²     Physical Exam:  Constitutional: Alert, no distress, well-groomed.  Skin: No rashes in visible areas.  Eye: Round. Conjunctiva clear, lids normal. No icterus.   ENMT: Lips pink without lesions, good dentition, moist mucous membranes. Phonation " normal.  Neck: Moves freely without pain.  Respiratory: Unlabored respiratory effort, no cough or audible wheeze  Psych: Alert and oriented x3, normal affect and mood.       Assessment and Plan:   The following treatment plan was discussed:     1. Obstructive sleep apnea syndrome  - DME Mask and Supplies  - Overnight Oximetry; Future    2. Nocturnal hypoxia    3. Primary insomnia    4. BMI 27.0-27.9,adult    5. Cough variant asthma      Discussed the cardiovascular and neuropsychiatric risks of untreated GÓMEZ; including but not limited to: HTN, DM, MI, ASCVD, CVA, CHF, traffic accidents.     1. HSS from 3/23/21 with dental appliance in place indicated no significant sleep apnea - TELLY 4.3. A home sleep study may underestimate the degree of sleep apnea as total sleep time is unknown and monitoring time is used instead.  Persistent nocturnal desaturation - da saturation 83% - less than 90% for 42.4% of the TIB. No significant snorin total snoring episodes/percentage of snoring 1.0%.   Recommendation:  There is no indication for positive airway pressure therapy. If the suspicion of sleep apnea is high recommend an attended in lab study. The patient may be a candidate for nocturnal oxygen supplementation.     * Patient will continue using autoCPAP. Compliance report from 3/10/21-21 was downloaded and reviewed with the patient which showed autoCPAP 5-15 cmH2O, 80% compliance, 5 hrs 7 min use (80% compliance), AHI of 0.4. Continue autoCPAP. Patient is compliant and benefiting from autoCPAP therapy for management of GÓMEZ.     * DME order ([facility]) for mask (small dreamwear nasal mask with medium headgear or MOC) and supplies was provided today. Continue to clean mask and supplies weekly, and change supplies per insurance guidelines.     2. Nocturnal hypoxia: order OPO on autoCPAP 5-15 cmH2O and RA to r/o nocturnal hypoxia. Advised the patient to keep the CPAP on through the night of the study. Will message patient  with results when available. May need to complete a titration study if study confirms nocturnal hypoxia on CPAP therapy.   3. Sleep hygiene discussed. Recommend keeping a set sleep/wake schedule. Logging enough hours of sleep. Limiting/Avoiding naps. No caffeine after noon and no heavy meals in the evening.    Primary insomnia: currently using CBD or Indica marijuana tablets prn. May consider referral to Dr. Erwin for CBT-I.   4. Continue to stay active.   5. Cough variant asthma: stable and controlled with Symbicort. Continue to f/u with PCP for management.   6. Follow up with the appropriate healthcare practitioners for all other medical problems and issues.         Follow-up: Return in about 6 months (around 10/9/2021). or sooner if needed.     DARRIAN Paz.    This dictation was created using voice recognition software. The accuracy of the dictation is limited to the abilities of the software. I expect there may be some errors of grammar and possibly content.

## 2021-04-09 NOTE — PATIENT INSTRUCTIONS
Sleep Apnea  Sleep apnea affects breathing during sleep. It causes breathing to stop for a short time or to become shallow. It can also increase the risk of:  · Heart attack.  · Stroke.  · Being very overweight (obese).  · Diabetes.  · Heart failure.  · Irregular heartbeat.  The goal of treatment is to help you breathe normally again.  What are the causes?  There are three kinds of sleep apnea:  · Obstructive sleep apnea. This is caused by a blocked or collapsed airway.  · Central sleep apnea. This happens when the brain does not send the right signals to the muscles that control breathing.  · Mixed sleep apnea. This is a combination of obstructive and central sleep apnea.  The most common cause of this condition is a collapsed or blocked airway. This can happen if:  · Your throat muscles are too relaxed.  · Your tongue and tonsils are too large.  · You are overweight.  · Your airway is too small.  What increases the risk?  · Being overweight.  · Smoking.  · Having a small airway.  · Being older.  · Being male.  · Drinking alcohol.  · Taking medicines to calm yourself (sedatives or tranquilizers).  · Having family members with the condition.  What are the signs or symptoms?  · Trouble staying asleep.  · Being sleepy or tired during the day.  · Getting angry a lot.  · Loud snoring.  · Headaches in the morning.  · Not being able to focus your mind (concentrate).  · Forgetting things.  · Less interest in sex.  · Mood swings.  · Personality changes.  · Feelings of sadness (depression).  · Waking up a lot during the night to pee (urinate).  · Dry mouth.  · Sore throat.  How is this diagnosed?  · Your medical history.  · A physical exam.  · A test that is done when you are sleeping (sleep study). The test is most often done in a sleep lab but may also be done at home.  How is this treated?    · Sleeping on your side.  · Using a medicine to get rid of mucus in your nose (decongestant).  · Avoiding the use of alcohol,  medicines to help you relax, or certain pain medicines (narcotics).  · Losing weight, if needed.  · Changing your diet.  · Not smoking.  · Using a machine to open your airway while you sleep, such as:  ? An oral appliance. This is a mouthpiece that shifts your lower jaw forward.  ? A CPAP device. This device blows air through a mask when you breathe out (exhale).  ? An EPAP device. This has valves that you put in each nostril.  ? A BPAP device. This device blows air through a mask when you breathe in (inhale) and breathe out.  · Having surgery if other treatments do not work.  It is important to get treatment for sleep apnea. Without treatment, it can lead to:  · High blood pressure.  · Coronary artery disease.  · In men, not being able to have an erection (impotence).  · Reduced thinking ability.  Follow these instructions at home:  Lifestyle  · Make changes that your doctor recommends.  · Eat a healthy diet.  · Lose weight if needed.  · Avoid alcohol, medicines to help you relax, and some pain medicines.  · Do not use any products that contain nicotine or tobacco, such as cigarettes, e-cigarettes, and chewing tobacco. If you need help quitting, ask your doctor.  General instructions  · Take over-the-counter and prescription medicines only as told by your doctor.  · If you were given a machine to use while you sleep, use it only as told by your doctor.  · If you are having surgery, make sure to tell your doctor you have sleep apnea. You may need to bring your device with you.  · Keep all follow-up visits as told by your doctor. This is important.  Contact a doctor if:  · The machine that you were given to use during sleep bothers you or does not seem to be working.  · You do not get better.  · You get worse.  Get help right away if:  · Your chest hurts.  · You have trouble breathing in enough air.  · You have an uncomfortable feeling in your back, arms, or stomach.  · You have trouble talking.  · One side of your  body feels weak.  · A part of your face is hanging down.  These symptoms may be an emergency. Do not wait to see if the symptoms will go away. Get medical help right away. Call your local emergency services (911 in the [place] Do not drive yourself to the hospital.  Summary  · This condition affects breathing during sleep.  · The most common cause is a collapsed or blocked airway.  · The goal of treatment is to help you breathe normally while you sleep.  This information is not intended to replace advice given to you by your health care provider. Make sure you discuss any questions you have with your health care provider.  Document Released: 09/26/2009 Document Revised: 10/04/2019 Document Reviewed: 08/13/2019  [employer] Patient Education © 2020 [employer]    Insomnia  Insomnia is a sleep disorder that makes it difficult to fall asleep or stay asleep. Insomnia can cause fatigue, low energy, difficulty concentrating, mood swings, and poor performance at work or school.  There are three different ways to classify insomnia:  · Difficulty falling asleep.  · Difficulty staying asleep.  · Waking up too early in the morning.  Any type of insomnia can be long-term (chronic) or short-term (acute). Both are common. Short-term insomnia usually lasts for three months or less. Chronic insomnia occurs at least three times a week for longer than three months.  What are the causes?  Insomnia may be caused by another condition, situation, or substance, such as:  · Anxiety.  · Certain medicines.  · Gastroesophageal reflux disease (GERD) or other gastrointestinal conditions.  · Asthma or other breathing conditions.  · Restless legs syndrome, sleep apnea, or other sleep disorders.  · Chronic pain.  · Menopause.  · Stroke.  · Abuse of alcohol, tobacco, or illegal drugs.  · Mental health conditions, such as depression.  · Caffeine.  · Neurological disorders, such as Alzheimer's disease.  · An overactive thyroid  (hyperthyroidism).  Sometimes, the cause of insomnia may not be known.  What increases the risk?  Risk factors for insomnia include:  · Gender. Women are affected more often than men.  · Age. Insomnia is more common as you get older.  · Stress.  · Lack of exercise.  · Irregular work schedule or working night shifts.  · Traveling between different time zones.  · Certain medical and mental health conditions.  What are the signs or symptoms?  If you have insomnia, the main symptom is having trouble falling asleep or having trouble staying asleep. This may lead to other symptoms, such as:  · Feeling fatigued or having low energy.  · Feeling nervous about going to sleep.  · Not feeling rested in the morning.  · Having trouble concentrating.  · Feeling irritable, anxious, or depressed.  How is this diagnosed?  This condition may be diagnosed based on:  · Your symptoms and medical history. Your health care provider may ask about:  ? Your sleep habits.  ? Any medical conditions you have.  ? Your mental health.  · A physical exam.  How is this treated?  Treatment for insomnia depends on the cause. Treatment may focus on treating an underlying condition that is causing insomnia. Treatment may also include:  · Medicines to help you sleep.  · Counseling or therapy.  · Lifestyle adjustments to help you sleep better.  Follow these instructions at home:  Eating and drinking    · Limit or avoid alcohol, caffeinated beverages, and cigarettes, especially close to bedtime. These can disrupt your sleep.  · Do not eat a large meal or eat spicy foods right before bedtime. This can lead to digestive discomfort that can make it hard for you to sleep.  Sleep habits    · Keep a sleep diary to help you and your health care provider figure out what could be causing your insomnia. Write down:  ? When you sleep.  ? When you wake up during the night.  ? How well you sleep.  ? How rested you feel the next day.  ? Any side effects of medicines you  are taking.  ? What you eat and drink.  · Make your bedroom a dark, comfortable place where it is easy to fall asleep.  ? Put up shades or blackout curtains to block light from outside.  ? Use a white noise machine to block noise.  ? Keep the temperature cool.  · Limit screen use before bedtime. This includes:  ? Watching TV.  ? Using your smartphone, tablet, or computer.  · Stick to a routine that includes going to bed and waking up at the same times every day and night. This can help you fall asleep faster. Consider making a quiet activity, such as reading, part of your nighttime routine.  · Try to avoid taking naps during the day so that you sleep better at night.  · Get out of bed if you are still awake after 15 minutes of trying to sleep. Keep the lights down, but try reading or doing a quiet activity. When you feel sleepy, go back to bed.  General instructions  · Take over-the-counter and prescription medicines only as told by your health care provider.  · Exercise regularly, as told by your health care provider. Avoid exercise starting several hours before bedtime.  · Use relaxation techniques to manage stress. Ask your health care provider to suggest some techniques that may work well for you. These may include:  ? Breathing exercises.  ? Routines to release muscle tension.  ? Visualizing peaceful scenes.  · Make sure that you drive carefully. Avoid driving if you feel very sleepy.  · Keep all follow-up visits as told by your health care provider. This is important.  Contact a health care provider if:  · You are tired throughout the day.  · You have trouble in your daily routine due to sleepiness.  · You continue to have sleep problems, or your sleep problems get worse.  Get help right away if:  · You have serious thoughts about hurting yourself or someone else.  If you ever feel like you may hurt yourself or others, or have thoughts about taking your own life, get help right away. You can go to your nearest  emergency department or call:  · Your local emergency services (911 in the [place]  · A suicide crisis helpline, such as the National Suicide Prevention Lifeline at 1-852.769.2189. This is open 24 hours a day.  Summary  · Insomnia is a sleep disorder that makes it difficult to fall asleep or stay asleep.  · Insomnia can be long-term (chronic) or short-term (acute).  · Treatment for insomnia depends on the cause. Treatment may focus on treating an underlying condition that is causing insomnia.  · Keep a sleep diary to help you and your health care provider figure out what could be causing your insomnia.  This information is not intended to replace advice given to you by your health care provider. Make sure you discuss any questions you have with your health care provider.  Document Released: 12/15/2001 Document Revised: 11/30/2018 Document Reviewed: 09/27/2018  [employer] Patient Education © 2020 [employer]

## 2021-04-09 NOTE — TELEPHONE ENCOUNTER
Luca is still looking to into options for o2 and dental devices but this particular pt also utilizes her CPAP so we will follow the MDCR guidelines for qualifying o2 with CPAP and go from there.  We will start with an OPO on CPAP and room air for screening and potentially move the a titration study

## 2021-04-12 ENCOUNTER — HOSPITAL ENCOUNTER (OUTPATIENT)
Dept: RADIOLOGY | Facility: MEDICAL CENTER | Age: 71
End: 2021-04-12
Attending: FAMILY MEDICINE
Payer: MEDICARE

## 2021-04-12 DIAGNOSIS — Z12.31 ENCOUNTER FOR SCREENING MAMMOGRAM FOR BREAST CANCER: ICD-10-CM

## 2021-04-12 PROCEDURE — 77063 BREAST TOMOSYNTHESIS BI: CPT

## 2021-04-15 ENCOUNTER — HOME STUDY (OUTPATIENT)
Dept: SLEEP MEDICINE | Facility: MEDICAL CENTER | Age: 71
End: 2021-04-15
Attending: NURSE PRACTITIONER
Payer: MEDICARE

## 2021-04-15 DIAGNOSIS — G47.33 OBSTRUCTIVE SLEEP APNEA SYNDROME: Chronic | ICD-10-CM

## 2021-04-18 PROCEDURE — 94762 N-INVAS EAR/PLS OXIMTRY CONT: CPT | Performed by: FAMILY MEDICINE

## 2021-04-19 NOTE — PROCEDURES
Over Night Pulse Oximetry     Indication:To assess the efficacy of the current pressure .       Impression:   The study was done on CPAP 5-15 cm. The total analyzed time was 5 hrs 47 min. O2 Sat. da was 88% and mean O2 sat was 90 % and baseline O2 at 04%. O2 sat was below 88% for 0 min of the flow evaluation time. Oxygen Desaturation (>=3%) Index was 1.7/hr.      Recommendation:  Unremarkable OPO , continue the current therapy. Clinical correlation recommended.

## 2021-06-02 ENCOUNTER — OFFICE VISIT (OUTPATIENT)
Dept: PHYSICAL MEDICINE AND REHAB | Facility: MEDICAL CENTER | Age: 71
End: 2021-06-02
Payer: MEDICARE

## 2021-06-02 VITALS
HEIGHT: 62 IN | SYSTOLIC BLOOD PRESSURE: 110 MMHG | BODY MASS INDEX: 27.6 KG/M2 | WEIGHT: 150 LBS | TEMPERATURE: 98.2 F | HEART RATE: 72 BPM | DIASTOLIC BLOOD PRESSURE: 74 MMHG | OXYGEN SATURATION: 96 %

## 2021-06-02 DIAGNOSIS — M47.812 SPONDYLOSIS OF CERVICAL REGION WITHOUT MYELOPATHY OR RADICULOPATHY: ICD-10-CM

## 2021-06-02 DIAGNOSIS — G89.29 CHRONIC PAIN OF BOTH KNEES: ICD-10-CM

## 2021-06-02 DIAGNOSIS — M25.562 CHRONIC PAIN OF BOTH KNEES: ICD-10-CM

## 2021-06-02 DIAGNOSIS — M17.0 PRIMARY OSTEOARTHRITIS OF BOTH KNEES: ICD-10-CM

## 2021-06-02 DIAGNOSIS — M25.561 CHRONIC PAIN OF BOTH KNEES: ICD-10-CM

## 2021-06-02 PROCEDURE — 99213 OFFICE O/P EST LOW 20 MIN: CPT | Performed by: PHYSICAL MEDICINE & REHABILITATION

## 2021-06-02 ASSESSMENT — PATIENT HEALTH QUESTIONNAIRE - PHQ9: CLINICAL INTERPRETATION OF PHQ2 SCORE: 0

## 2021-06-02 ASSESSMENT — FIBROSIS 4 INDEX: FIB4 SCORE: 1.05

## 2021-06-02 ASSESSMENT — PAIN SCALES - GENERAL: PAINLEVEL: 2=MINIMAL-SLIGHT

## 2021-06-02 NOTE — PROGRESS NOTES
Follow up patient note  Interventional spine and sports physiatry, Physical medicine rehabilitation      Chief complaint:   Chief Complaint   Patient presents with   • Follow-Up     neck pain         HISTORY    Please see new patient note by Dr Estrada,  for more details.     HPI  Patient identification: Audrey Aquino ,  1950,   With Diagnoses of Spondylosis of cervical region without myelopathy or radiculopathy, Chronic pain of both knees, and Primary osteoarthritis of both knees were pertinent to this visit.     Chronic neck pain improving after physical therapy and with a . She has been working with mobility and stabilization exercises. Neck pain is overall well controlled. She still has some intermittent pain with prolonged neck flexion.     Chronic bilateral knee pain. Worse with stairs and hills especially walking downhill. She likes to go on hikes and this can be difficult. She is going on walks for 5-6 miles. She is working on strengthening exercises.       ROS Red Flags :   Fever, Chills, Sweats: Denies  Involuntary Weight Loss: Denies  Bowel/Bladder Incontinence: Denies  Saddle Anesthesia: Denies        PMHx:   Past Medical History:   Diagnosis Date   • Asthma     cough equivalent   • Dry eyes    • Frequent headaches    • Gingivitis    • History of hormone therapy    • Hyperlipidemia    • Knee pain    • Overweight    • Shoulder injury    • Sleep disorder        PSHx:   Past Surgical History:   Procedure Laterality Date   • EYE SURGERY      right cataract remobal/lens placement       Family history   Family History   Problem Relation Age of Onset   • Cancer Mother         breast         Medications:   Outpatient Medications Marked as Taking for the 21 encounter (Office Visit) with Ru Estrada M.D.   Medication Sig Dispense Refill   • budesonide-formoterol (SYMBICORT) 160-4.5 MCG/ACT Aerosol Inhale 2 Puffs 2 Times a Day. 1 Each 2   • Estradiol 0.025 MG/24HR PATCH BIWEEKLY  "APPLY 1 PATCH TWICE WEEKLY AS DIRECTED 24 Patch 3   • fenofibrate (TRICOR) 145 MG Tab Take 1 Tab by mouth every day. 90 Tab 2   • estradiol (VAGIFEM) 10 MCG Tab insert 1 tablet vaginally once daily as directed 30 Tab 2   • Coenzyme Q10 (COQ10) 100 MG Cap Take  by mouth.     • Multiple Vitamins-Minerals (DRY EYE FORMULA) Cap Take  by mouth.     • CALCIUM-MAGNESIUM PO Take  by mouth.          Current Outpatient Medications on File Prior to Visit   Medication Sig Dispense Refill   • budesonide-formoterol (SYMBICORT) 160-4.5 MCG/ACT Aerosol Inhale 2 Puffs 2 Times a Day. 1 Each 2   • Estradiol 0.025 MG/24HR PATCH BIWEEKLY APPLY 1 PATCH TWICE WEEKLY AS DIRECTED 24 Patch 3   • fenofibrate (TRICOR) 145 MG Tab Take 1 Tab by mouth every day. 90 Tab 2   • estradiol (VAGIFEM) 10 MCG Tab insert 1 tablet vaginally once daily as directed 30 Tab 2   • Coenzyme Q10 (COQ10) 100 MG Cap Take  by mouth.     • Multiple Vitamins-Minerals (DRY EYE FORMULA) Cap Take  by mouth.     • CALCIUM-MAGNESIUM PO Take  by mouth.       No current facility-administered medications on file prior to visit.         Allergies:   Allergies   Allergen Reactions   • Codeine      \"Makes me sick\"   • Penicillins      Never taken but has family history of anaphylaxis death       Social Hx:   Social History     Socioeconomic History   • Marital status:      Spouse name: Not on file   • Number of children: Not on file   • Years of education: Not on file   • Highest education level: Professional school degree (e.g., MD, DDS, DVM, FRANK)   Occupational History   • Not on file   Tobacco Use   • Smoking status: Never Smoker   • Smokeless tobacco: Never Used   Vaping Use   • Vaping Use: Never used   Substance and Sexual Activity   • Alcohol use: Yes     Comment: 2-3 drinks a week    • Drug use: Yes     Types: Marijuana     Comment: indica tablets for sleep   • Sexual activity: Not Currently   Other Topics Concern   •  Service No   • Blood Transfusions No " "  • Caffeine Concern No   • Occupational Exposure No   • Hobby Hazards Yes   • Sleep Concern Yes   • Stress Concern No   • Weight Concern Yes   • Special Diet No   • Back Care No   • Exercise Yes   • Bike Helmet No   • Seat Belt Yes   • Self-Exams Yes   Social History Narrative   • Not on file     Social Determinants of Health     Financial Resource Strain: Low Risk    • Difficulty of Paying Living Expenses: Not hard at all   Food Insecurity: No Food Insecurity   • Worried About Running Out of Food in the Last Year: Never true   • Ran Out of Food in the Last Year: Never true   Transportation Needs: No Transportation Needs   • Lack of Transportation (Medical): No   • Lack of Transportation (Non-Medical): No   Physical Activity: Sufficiently Active   • Days of Exercise per Week: 5 days   • Minutes of Exercise per Session: 30 min   Stress: No Stress Concern Present   • Feeling of Stress : Only a little   Social Connections: Moderately Integrated   • Frequency of Communication with Friends and Family: More than three times a week   • Frequency of Social Gatherings with Friends and Family: More than three times a week   • Attends Hinduism Services: More than 4 times per year   • Active Member of Clubs or Organizations: Yes   • Attends Club or Organization Meetings: More than 4 times per year   • Marital Status:    Intimate Partner Violence:    • Fear of Current or Ex-Partner:    • Emotionally Abused:    • Physically Abused:    • Sexually Abused:          EXAMINATION     Physical Exam:   Vitals: /74 (BP Location: Right arm, Patient Position: Sitting, BP Cuff Size: Adult)   Pulse 72   Temp 36.8 °C (98.2 °F) (Temporal)   Ht 1.575 m (5' 2\")   Wt 68 kg (150 lb)   SpO2 96%     Constitutional:   Body Habitus: Body mass index is 27.44 kg/m².  Cooperation: Fully cooperates with exam  Appearance: Well-groomed no disheveled    Respiratory-  breathing comfortable on room air, no audible wheezing  Cardiovascular- " capillary refills less than 2 seconds. No lower extremity edema is noted.   Psychiatric- alert and oriented ×3. Normal affect.    MSK and Neuro: -  BILATERAL  Knee:   Inspection no swelling, rashes or deformities,   Palpation minimal tenderness to palpation over the upper patellar saphenous nerve right worse than left.  No significant tenderness to palpation throughout the knee including the medial joint line, lateral joint line, quadriceps tendon, patellar tendon, patellar, medial patellar facets, lateral patellar facets, tibial tuberosity, fibular head.  Range of motion normal range of motion in flexion and extension  Special tests:   Varus stress tests: Negative  Valgus stress tests: Negative  Lockman's test: Negative  Anterior drawer: Negative  Posterior drawer: Negative  Nu's: negative              MEDICAL DECISION MAKING    DATA    Labs:   Lab Results   Component Value Date/Time    SODIUM 144 03/22/2021 06:29 AM    POTASSIUM 4.9 03/22/2021 06:29 AM    CHLORIDE 108 03/22/2021 06:29 AM    CO2 27 03/22/2021 06:29 AM    GLUCOSE 88 03/22/2021 06:29 AM    BUN 15 03/22/2021 06:29 AM    CREATININE 1.06 03/22/2021 06:29 AM        No results found for: PROTHROMBTM, INR     Lab Results   Component Value Date/Time    WBC 5.7 03/22/2021 06:29 AM    RBC 5.06 03/22/2021 06:29 AM    HEMOGLOBIN 14.7 03/22/2021 06:29 AM    HEMATOCRIT 44.8 03/22/2021 06:29 AM    MCV 88.5 03/22/2021 06:29 AM    MCH 29.1 03/22/2021 06:29 AM    MCHC 32.8 (L) 03/22/2021 06:29 AM    MPV 9.9 03/22/2021 06:29 AM    NEUTSPOLYS 45.60 03/22/2021 06:29 AM    LYMPHOCYTES 42.10 (H) 03/22/2021 06:29 AM    MONOCYTES 8.30 03/22/2021 06:29 AM    EOSINOPHILS 2.80 03/22/2021 06:29 AM    BASOPHILS 0.50 03/22/2021 06:29 AM        No results found for: HBA1C       Imaging:   I personally reviewed following images       MRI cervical spine 6/6/2019  Moderate right C3-4 neuroforaminal stenosis.  Moderate bilateral C5-6 neuroforaminal stenosis.  Facet arthropathy  is present right worse than left C3-4, C4-5, C5-6.     X-ray bilateral knees 2019  Minimal bilateral osteoarthritis of the knees.    I reviewed the following radiology reports                 Results for orders placed during the hospital encounter of 19    MR-CERVICAL SPINE-W/O    Impression  1.  Multilevel degenerative disc disease, uncinate and facet degeneration. There are varying degrees of neural foraminal narrowing at levels as specifically described above. No significant central canal narrowing.    2.  Loss of the normal cervical lordosis.                                        Results for orders placed during the hospital encounter of 19    DX-CERVICAL SPINE-4+ VIEWS    Impression  1.  There is degenerative disease and arthropathy predominantly at the C5-6 and C6-7 levels.  2.  The alignment is normal. There is no abnormal motion.                     Results for orders placed during the hospital encounter of 19    DX-KNEE 3 VIEWS RIGHT    Impression  Minimal tricompartmental osteoarthrosis bilaterally. No erosions.   Results for orders placed during the hospital encounter of 19    DX-KNEES-AP BILATERAL STANDING    Impression  Minimal tricompartmental osteoarthrosis bilaterally. No erosions.                           DIAGNOSIS   Visit Diagnoses     ICD-10-CM   1. Spondylosis of cervical region without myelopathy or radiculopathy  M47.812   2. Chronic pain of both knees  M25.561    M25.562    G89.29   3. Primary osteoarthritis of both knees  M17.0         ASSESSMENT and PLAN:     Audrey Aquino  1950 female      Audrey was seen today for follow-up.    Diagnoses and all orders for this visit:    Spondylosis of cervical region without myelopathy or radiculopathy    Chronic pain of both knees    Primary osteoarthritis of both knees        We discussed the injections and nerve blocks but decided against this given the patient's symptoms are relatively mild.  She only has pain with  walking downwards of high-level activities of downhill and stairs.  Functionally she was doing quite well from her back pain and knee pain.  I advised continuing with her home exercise program with physical therapy as well as with her .    If there are flares of pain the patient can use Tylenol 1000 mg 3 times daily and/or ibuprofen 800 mg 3 times daily with food during flares of pain for 2 to 4 weeks.    Follow up: PRN with me    Thank you for allowing me to participate in the care of this patient. If you have any questions please not hesitate to contact me.             Please note that this dictation was created using voice recognition software. I have made every reasonable attempt to correct obvious errors but there may be errors of grammar and content that I may have overlooked prior to finalization of this note.      Ru Estrada MD  Interventional Spine and Sports Physiatry  Physical Medicine and Rehabilitation  [facility]

## 2021-08-16 ENCOUNTER — PATIENT MESSAGE (OUTPATIENT)
Dept: SLEEP MEDICINE | Facility: MEDICAL CENTER | Age: 71
End: 2021-08-16

## 2021-08-16 ENCOUNTER — PATIENT MESSAGE (OUTPATIENT)
Dept: MEDICAL GROUP | Facility: MEDICAL CENTER | Age: 71
End: 2021-08-16

## 2021-08-16 DIAGNOSIS — G47.33 OBSTRUCTIVE SLEEP APNEA SYNDROME: ICD-10-CM

## 2021-08-16 DIAGNOSIS — R53.83 FATIGUE, UNSPECIFIED TYPE: ICD-10-CM

## 2021-08-16 NOTE — TELEPHONE ENCOUNTER
From: Audrey Aquino  To: Nurse Practitioner Florinda Fernandez  Sent: 8/16/2021 11:06 AM PDT  Subject: CPAP mask change    Please send in a referral to [facility] for me so I can go in and try out different masks. The nasal pillow I have seems to be slipping more and more which results in more air being blown through the hole and it wakes me up disrupting my sleep. I tried a friend's triangular mask that covers the nose and it might be worthwhile seeing if that gives me better sleep.  thank you.  Audrey Aquino

## 2021-08-18 ENCOUNTER — HOSPITAL ENCOUNTER (OUTPATIENT)
Dept: LAB | Facility: MEDICAL CENTER | Age: 71
End: 2021-08-18
Attending: FAMILY MEDICINE
Payer: MEDICARE

## 2021-08-18 DIAGNOSIS — R53.83 FATIGUE, UNSPECIFIED TYPE: ICD-10-CM

## 2021-08-18 LAB
ALBUMIN SERPL BCP-MCNC: 4.5 G/DL (ref 3.2–4.9)
ALBUMIN/GLOB SERPL: 2 G/DL
ALP SERPL-CCNC: 76 U/L (ref 30–99)
ALT SERPL-CCNC: 28 U/L (ref 2–50)
ANION GAP SERPL CALC-SCNC: 13 MMOL/L (ref 7–16)
AST SERPL-CCNC: 31 U/L (ref 12–45)
BASOPHILS # BLD AUTO: 0.4 % (ref 0–1.8)
BASOPHILS # BLD: 0.02 K/UL (ref 0–0.12)
BILIRUB SERPL-MCNC: 0.5 MG/DL (ref 0.1–1.5)
BUN SERPL-MCNC: 15 MG/DL (ref 8–22)
CALCIUM SERPL-MCNC: 10 MG/DL (ref 8.5–10.5)
CHLORIDE SERPL-SCNC: 105 MMOL/L (ref 96–112)
CO2 SERPL-SCNC: 24 MMOL/L (ref 20–33)
CREAT SERPL-MCNC: 0.84 MG/DL (ref 0.5–1.4)
EOSINOPHIL # BLD AUTO: 0.11 K/UL (ref 0–0.51)
EOSINOPHIL NFR BLD: 2.1 % (ref 0–6.9)
ERYTHROCYTE [DISTWIDTH] IN BLOOD BY AUTOMATED COUNT: 44.3 FL (ref 35.9–50)
FASTING STATUS PATIENT QL REPORTED: NORMAL
GLOBULIN SER CALC-MCNC: 2.3 G/DL (ref 1.9–3.5)
GLUCOSE SERPL-MCNC: 87 MG/DL (ref 65–99)
HCT VFR BLD AUTO: 42.9 % (ref 37–47)
HGB BLD-MCNC: 14.1 G/DL (ref 12–16)
IMM GRANULOCYTES # BLD AUTO: 0.02 K/UL (ref 0–0.11)
IMM GRANULOCYTES NFR BLD AUTO: 0.4 % (ref 0–0.9)
LYMPHOCYTES # BLD AUTO: 1.99 K/UL (ref 1–4.8)
LYMPHOCYTES NFR BLD: 37.8 % (ref 22–41)
MCH RBC QN AUTO: 29.1 PG (ref 27–33)
MCHC RBC AUTO-ENTMCNC: 32.9 G/DL (ref 33.6–35)
MCV RBC AUTO: 88.5 FL (ref 81.4–97.8)
MONOCYTES # BLD AUTO: 0.45 K/UL (ref 0–0.85)
MONOCYTES NFR BLD AUTO: 8.6 % (ref 0–13.4)
NEUTROPHILS # BLD AUTO: 2.67 K/UL (ref 2–7.15)
NEUTROPHILS NFR BLD: 50.7 % (ref 44–72)
NRBC # BLD AUTO: 0 K/UL
NRBC BLD-RTO: 0 /100 WBC
PLATELET # BLD AUTO: 313 K/UL (ref 164–446)
PMV BLD AUTO: 10.3 FL (ref 9–12.9)
POTASSIUM SERPL-SCNC: 4.2 MMOL/L (ref 3.6–5.5)
PROT SERPL-MCNC: 6.8 G/DL (ref 6–8.2)
RBC # BLD AUTO: 4.85 M/UL (ref 4.2–5.4)
SODIUM SERPL-SCNC: 142 MMOL/L (ref 135–145)
TSH SERPL DL<=0.005 MIU/L-ACNC: 2.45 UIU/ML (ref 0.38–5.33)
WBC # BLD AUTO: 5.3 K/UL (ref 4.8–10.8)

## 2021-08-18 PROCEDURE — 80053 COMPREHEN METABOLIC PANEL: CPT

## 2021-08-18 PROCEDURE — 84443 ASSAY THYROID STIM HORMONE: CPT

## 2021-08-18 PROCEDURE — 36415 COLL VENOUS BLD VENIPUNCTURE: CPT

## 2021-08-18 PROCEDURE — 85025 COMPLETE CBC W/AUTO DIFF WBC: CPT

## 2021-08-26 ENCOUNTER — OFFICE VISIT (OUTPATIENT)
Dept: URGENT CARE | Facility: PHYSICIAN GROUP | Age: 71
End: 2021-08-26
Payer: MEDICARE

## 2021-08-26 ENCOUNTER — HOSPITAL ENCOUNTER (OUTPATIENT)
Facility: MEDICAL CENTER | Age: 71
End: 2021-08-26
Attending: NURSE PRACTITIONER
Payer: MEDICARE

## 2021-08-26 VITALS
BODY MASS INDEX: 25.76 KG/M2 | OXYGEN SATURATION: 97 % | TEMPERATURE: 98.9 F | HEIGHT: 62 IN | RESPIRATION RATE: 16 BRPM | HEART RATE: 62 BPM | WEIGHT: 140 LBS | SYSTOLIC BLOOD PRESSURE: 98 MMHG | DIASTOLIC BLOOD PRESSURE: 60 MMHG

## 2021-08-26 DIAGNOSIS — J02.9 SORE THROAT: ICD-10-CM

## 2021-08-26 DIAGNOSIS — Z20.822 EXPOSURE TO COVID-19 VIRUS: Primary | ICD-10-CM

## 2021-08-26 DIAGNOSIS — J06.9 UPPER RESPIRATORY INFECTION, ACUTE: ICD-10-CM

## 2021-08-26 PROCEDURE — 99213 OFFICE O/P EST LOW 20 MIN: CPT | Mod: CS | Performed by: NURSE PRACTITIONER

## 2021-08-26 PROCEDURE — U0005 INFEC AGEN DETEC AMPLI PROBE: HCPCS

## 2021-08-26 PROCEDURE — U0003 INFECTIOUS AGENT DETECTION BY NUCLEIC ACID (DNA OR RNA); SEVERE ACUTE RESPIRATORY SYNDROME CORONAVIRUS 2 (SARS-COV-2) (CORONAVIRUS DISEASE [COVID-19]), AMPLIFIED PROBE TECHNIQUE, MAKING USE OF HIGH THROUGHPUT TECHNOLOGIES AS DESCRIBED BY CMS-2020-01-R: HCPCS

## 2021-08-26 ASSESSMENT — ENCOUNTER SYMPTOMS
SORE THROAT: 1
CHILLS: 0
FEVER: 0

## 2021-08-26 ASSESSMENT — FIBROSIS 4 INDEX: FIB4 SCORE: 1.31

## 2021-08-27 DIAGNOSIS — Z20.822 EXPOSURE TO COVID-19 VIRUS: ICD-10-CM

## 2021-08-27 DIAGNOSIS — J02.9 SORE THROAT: ICD-10-CM

## 2021-08-27 DIAGNOSIS — J06.9 UPPER RESPIRATORY INFECTION, ACUTE: ICD-10-CM

## 2021-08-27 NOTE — PROGRESS NOTES
Subjective     Audrey Aquino is a 71 y.o. female who presents with Coronavirus Screening (x2 days. sore throat, exposed to covid a week ago )    Past Medical History:   Diagnosis Date   • Asthma     cough equivalent   • Dry eyes    • Frequent headaches    • Gingivitis    • History of hormone therapy    • Hyperlipidemia    • Knee pain    • Overweight    • Shoulder injury    • Sleep disorder      Social History     Socioeconomic History   • Marital status:      Spouse name: Not on file   • Number of children: Not on file   • Years of education: Not on file   • Highest education level: Professional school degree (e.g., MD, DDS, DVM, FRANK)   Occupational History   • Not on file   Tobacco Use   • Smoking status: Never Smoker   • Smokeless tobacco: Never Used   Vaping Use   • Vaping Use: Never used   Substance and Sexual Activity   • Alcohol use: Yes     Comment: 2-3 drinks a week    • Drug use: Yes     Types: Marijuana     Comment: indica tablets for sleep   • Sexual activity: Not Currently   Other Topics Concern   •  Service No   • Blood Transfusions No   • Caffeine Concern No   • Occupational Exposure No   • Hobby Hazards Yes   • Sleep Concern Yes   • Stress Concern No   • Weight Concern Yes   • Special Diet No   • Back Care No   • Exercise Yes   • Bike Helmet No   • Seat Belt Yes   • Self-Exams Yes   Social History Narrative   • Not on file     Social Determinants of Health     Financial Resource Strain: Low Risk    • Difficulty of Paying Living Expenses: Not hard at all   Food Insecurity: No Food Insecurity   • Worried About Running Out of Food in the Last Year: Never true   • Ran Out of Food in the Last Year: Never true   Transportation Needs: No Transportation Needs   • Lack of Transportation (Medical): No   • Lack of Transportation (Non-Medical): No   Physical Activity: Sufficiently Active   • Days of Exercise per Week: 5 days   • Minutes of Exercise per Session: 30 min   Stress: No Stress  "Concern Present   • Feeling of Stress : Only a little   Social Connections: Moderately Integrated   • Frequency of Communication with Friends and Family: More than three times a week   • Frequency of Social Gatherings with Friends and Family: More than three times a week   • Attends Christianity Services: More than 4 times per year   • Active Member of Clubs or Organizations: Yes   • Attends Club or Organization Meetings: More than 4 times per year   • Marital Status:    Intimate Partner Violence:    • Fear of Current or Ex-Partner:    • Emotionally Abused:    • Physically Abused:    • Sexually Abused:      Family History   Problem Relation Age of Onset   • Cancer Mother         breast       Allergies: Codeine and Penicillins    This patient is a 71-year-old female who presents today with complaint of sore throat.  She is requesting to be tested for Covid as she was exposed to someone who had it a week ago.  Patient has been fully vaccinated.          URI   This is a new problem. The current episode started in the past 7 days. The problem has been unchanged. There has been no fever. Associated symptoms include a sore throat. She has tried nothing for the symptoms. The treatment provided no relief.       Review of Systems   Constitutional: Positive for malaise/fatigue. Negative for chills and fever.   HENT: Positive for sore throat.    All other systems reviewed and are negative.             Objective     BP (!) 98/60   Pulse 62   Temp 37.2 °C (98.9 °F) (Temporal)   Resp 16   Ht 1.575 m (5' 2\")   Wt 63.5 kg (140 lb)   SpO2 97%   BMI 25.61 kg/m²      Physical Exam  Vitals reviewed.   Constitutional:       Appearance: Normal appearance.   HENT:      Head: Normocephalic and atraumatic.      Right Ear: Tympanic membrane and ear canal normal.      Left Ear: Tympanic membrane, ear canal and external ear normal.      Nose: Nose normal.      Mouth/Throat:      Mouth: Mucous membranes are moist.   Eyes:      " Extraocular Movements: Extraocular movements intact.      Conjunctiva/sclera: Conjunctivae normal.      Pupils: Pupils are equal, round, and reactive to light.   Cardiovascular:      Rate and Rhythm: Normal rate and regular rhythm.      Heart sounds: Normal heart sounds.   Pulmonary:      Effort: Pulmonary effort is normal. No respiratory distress.      Breath sounds: Normal breath sounds. No stridor. No wheezing, rhonchi or rales.   Chest:      Chest wall: No tenderness.   Musculoskeletal:         General: Normal range of motion.      Cervical back: Normal range of motion and neck supple.   Skin:     General: Skin is warm and dry.   Neurological:      Mental Status: She is alert and oriented to person, place, and time.   Psychiatric:         Mood and Affect: Mood normal.         Behavior: Behavior normal.                             Assessment & Plan      URI  Sore throat    Covid nasal swab obtained  Self quarantine until results received  Written instructions given for self-care at home  Patient advised she will receive results via [employer]  Over-the-counter cough/cold medication of choice as needed  Strict ER precautions for severe shortness of breath, chest pain, or weakness  There are no diagnoses linked to this encounter.

## 2021-10-06 ENCOUNTER — APPOINTMENT (OUTPATIENT)
Dept: SLEEP MEDICINE | Facility: MEDICAL CENTER | Age: 71
End: 2021-10-06
Payer: COMMERCIAL

## 2021-10-10 ENCOUNTER — PATIENT MESSAGE (OUTPATIENT)
Dept: MEDICAL GROUP | Facility: MEDICAL CENTER | Age: 71
End: 2021-10-10

## 2021-10-10 DIAGNOSIS — Z11.52 ENCOUNTER FOR SCREENING FOR COVID-19: ICD-10-CM

## 2021-10-13 ENCOUNTER — HOSPITAL ENCOUNTER (OUTPATIENT)
Facility: MEDICAL CENTER | Age: 71
End: 2021-10-13
Attending: PHYSICIAN ASSISTANT
Payer: MEDICARE

## 2021-10-13 ENCOUNTER — OFFICE VISIT (OUTPATIENT)
Dept: URGENT CARE | Facility: PHYSICIAN GROUP | Age: 71
End: 2021-10-13
Payer: MEDICARE

## 2021-10-13 VITALS
DIASTOLIC BLOOD PRESSURE: 62 MMHG | OXYGEN SATURATION: 100 % | TEMPERATURE: 97.9 F | HEART RATE: 56 BPM | RESPIRATION RATE: 18 BRPM | HEIGHT: 62 IN | BODY MASS INDEX: 25.58 KG/M2 | WEIGHT: 139 LBS | SYSTOLIC BLOOD PRESSURE: 104 MMHG

## 2021-10-13 DIAGNOSIS — Z20.822 EXPOSURE TO CONFIRMED CASE OF COVID-19: ICD-10-CM

## 2021-10-13 PROCEDURE — U0003 INFECTIOUS AGENT DETECTION BY NUCLEIC ACID (DNA OR RNA); SEVERE ACUTE RESPIRATORY SYNDROME CORONAVIRUS 2 (SARS-COV-2) (CORONAVIRUS DISEASE [COVID-19]), AMPLIFIED PROBE TECHNIQUE, MAKING USE OF HIGH THROUGHPUT TECHNOLOGIES AS DESCRIBED BY CMS-2020-01-R: HCPCS

## 2021-10-13 PROCEDURE — U0005 INFEC AGEN DETEC AMPLI PROBE: HCPCS

## 2021-10-13 PROCEDURE — 99213 OFFICE O/P EST LOW 20 MIN: CPT | Mod: CS | Performed by: PHYSICIAN ASSISTANT

## 2021-10-13 ASSESSMENT — ENCOUNTER SYMPTOMS
CHILLS: 1
ABDOMINAL PAIN: 0
SORE THROAT: 1
SHORTNESS OF BREATH: 0
SPUTUM PRODUCTION: 1
NAUSEA: 0
HEADACHES: 1
VOMITING: 0
WHEEZING: 0
MYALGIAS: 1
FEVER: 0
DIARRHEA: 0
COUGH: 1

## 2021-10-13 ASSESSMENT — FIBROSIS 4 INDEX: FIB4 SCORE: 1.31

## 2021-10-13 NOTE — PROGRESS NOTES
Subjective:   Audrey Aquino is a 71 y.o. female who presents for Headache (sore throat, fatigue, runny nose, cough, body aches, chills, x3 days. )      HPI  71 y.o. female presents to urgent care with new problem to provider of URI symptoms including cough and sore throat onset 3 days ago after confirmed exposure to COVID-19.  She denies sick contacts in her home.  Denies fevers, chest pain, or shortness of breath.  Patient is vaccinated for COVID-19 and has also received a booster shot about 1 month ago.  Denies other associated aggravating or alleviating factors.     Review of Systems   Constitutional: Positive for chills and malaise/fatigue. Negative for fever.   HENT: Positive for congestion and sore throat.    Respiratory: Positive for cough and sputum production. Negative for shortness of breath and wheezing.    Cardiovascular: Negative for chest pain.   Gastrointestinal: Negative for abdominal pain, diarrhea, nausea and vomiting.   Musculoskeletal: Positive for myalgias.   Neurological: Positive for headaches.       Patient Active Problem List   Diagnosis   • Cough variant asthma   • Intractable migraine without aura and without status migrainosus   • Primary insomnia   • Bilateral patellofemoral syndrome   • Allergic rhinitis   • History of radial keratotomy   • Mixed hyperlipidemia   • Hx of total hysterectomy   • Acquired absence of other genital organ(s)   • Diverticulosis of colon   • Obstructive sleep apnea syndrome   • Vaginal atrophy   • Trigger ring finger of right hand   • Foraminal stenosis of cervical region   • Gingivitis   • Hypertriglyceridemia     Past Surgical History:   Procedure Laterality Date   • EYE SURGERY      right cataract remobal/lens placement     Social History     Tobacco Use   • Smoking status: Never Smoker   • Smokeless tobacco: Never Used   Vaping Use   • Vaping Use: Never used   Substance Use Topics   • Alcohol use: Yes     Comment: 2-3 drinks a week    • Drug use: Yes     " Types: Marijuana     Comment: indica tablets for sleep      Family History   Problem Relation Age of Onset   • Cancer Mother         breast      (Allergies, Medications, & Tobacco/Substance Use were reconciled by the Medical Assistant and reviewed by myself. The family history is prepopulated)     Objective:     /62   Pulse (!) 56   Temp 36.6 °C (97.9 °F) (Temporal)   Resp 18   Ht 1.575 m (5' 2\")   Wt 63 kg (139 lb)   SpO2 100%   BMI 25.42 kg/m²     Physical Exam  Vitals reviewed.   Constitutional:       General: She is not in acute distress.     Appearance: Normal appearance. She is not ill-appearing or diaphoretic.   HENT:      Head: Normocephalic and atraumatic.      Nose: Nose normal.      Mouth/Throat:      Mouth: Mucous membranes are moist.      Pharynx: No oropharyngeal exudate or posterior oropharyngeal erythema.   Eyes:      Conjunctiva/sclera: Conjunctivae normal.   Cardiovascular:      Rate and Rhythm: Normal rate and regular rhythm.      Heart sounds: Normal heart sounds. No murmur heard.   No friction rub. No gallop.    Pulmonary:      Effort: Pulmonary effort is normal. No respiratory distress.      Breath sounds: No rhonchi or rales.      Comments: Minimal upper respiratory wheezing  Musculoskeletal:         General: Normal range of motion.      Cervical back: Normal range of motion and neck supple.   Lymphadenopathy:      Cervical: No cervical adenopathy.   Skin:     General: Skin is warm and dry.      Findings: No rash.   Neurological:      General: No focal deficit present.      Mental Status: She is alert and oriented to person, place, and time.   Psychiatric:         Mood and Affect: Mood normal.         Behavior: Behavior normal.         Thought Content: Thought content normal.         Judgment: Judgment normal.         Assessment/Plan:     1. Exposure to confirmed case of COVID-19  COVID/SARS CoV-2 PCR     Recommend patient use previously prescribed albuterol inhaler for worsening " symptoms of wheezing.  Patient instructed to self-isolate/quarantine per CDC guidelines.  I will follow-up pending COVID-19 testing. Discussed with patient may obtain hard copy of results on [employer].   Advised patient symptoms are most likely viral in etiology. Increased fluids and rest. Discussed use of OTC cough and cold medication and Tylenol/Motrin for symptomatic relief.  Return for reevaluation or proceed to ED if symptoms persist or worsen. Supportive care, differential diagnoses, and indications for immediate follow-up discussed with patient. Patient should to proceed to ED for development of symptoms including but not limited to shortness of breath breath, difficulty breathing, or worsening symptoms not manageable at home.   Vital signs stable, patient in no acute respiratory distress.  COVID-19 discharge instructions and CDC guidelines provided to patient in AVS.      Differential diagnosis, natural history, supportive care, and indications for immediate follow-up discussed.    Advised the patient to follow-up with the primary care physician for recheck, reevaluation, and consideration of further management.  Patient verbalized understanding of treatment plan and has no further questions regarding care.   This patient is evaluated under [facility] isolation protocols in urgent care.  Out of an abundance of caution I am wearing a N95 mask, protective eye gear, and gloves through all interaction with patient.    I personally reviewed prior external notes and test results pertinent to today's visit.     Please note that this dictation was created using voice recognition software. I have made a reasonable attempt to correct obvious errors, but I expect that there are errors of grammar and possibly content that I did not discover before finalizing the note.    This note was electronically signed by Twyla Adan PA-C

## 2021-10-14 DIAGNOSIS — Z20.822 EXPOSURE TO CONFIRMED CASE OF COVID-19: ICD-10-CM

## 2021-11-02 DIAGNOSIS — E78.1 HYPERTRIGLYCERIDEMIA: ICD-10-CM

## 2021-11-03 RX ORDER — FENOFIBRATE 145 MG/1
TABLET, COATED ORAL
Qty: 90 TABLET | Refills: 3 | Status: SHIPPED | OUTPATIENT
Start: 2021-11-03 | End: 2022-10-31

## 2021-11-10 ENCOUNTER — OFFICE VISIT (OUTPATIENT)
Dept: SLEEP MEDICINE | Facility: MEDICAL CENTER | Age: 71
End: 2021-11-10
Payer: MEDICARE

## 2021-11-10 VITALS
HEART RATE: 58 BPM | HEIGHT: 62 IN | RESPIRATION RATE: 14 BRPM | SYSTOLIC BLOOD PRESSURE: 90 MMHG | DIASTOLIC BLOOD PRESSURE: 60 MMHG | WEIGHT: 140 LBS | TEMPERATURE: 97.6 F | BODY MASS INDEX: 25.76 KG/M2 | OXYGEN SATURATION: 98 %

## 2021-11-10 DIAGNOSIS — G47.34 NOCTURNAL HYPOXIA: ICD-10-CM

## 2021-11-10 DIAGNOSIS — G47.33 OBSTRUCTIVE SLEEP APNEA SYNDROME: Primary | ICD-10-CM

## 2021-11-10 PROCEDURE — 99213 OFFICE O/P EST LOW 20 MIN: CPT | Performed by: STUDENT IN AN ORGANIZED HEALTH CARE EDUCATION/TRAINING PROGRAM

## 2021-11-10 ASSESSMENT — PAIN SCALES - GENERAL: PAINLEVEL: NO PAIN

## 2021-11-10 ASSESSMENT — FIBROSIS 4 INDEX: FIB4 SCORE: 1.31

## 2021-11-10 NOTE — PROGRESS NOTES
[facility] Follow-up Visit    CC: Follow-up to discuss GÓMEZ management      HPI:  Audrey Aquino is a 71 y.o.female  with hyperlipidemia, obstructive sleep apnea on CPAP and history of migraines.  Presents sleep clinic today to follow-up for GÓMEZ management.    She continues to use her CPAP nightly.  States with using CPAP she wakes up feeling more refreshed and is more focused during the day.  States prior to starting CPAP she was fearful she was developing dementia as she was forgetful throughout the day and was difficult function at work.  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    She does have oral appliance which she uses on camping trips.  She has had it adjusted by Dr. Hamilton.  She is interested to know if adjustment is made a difference as she underwent previous home sleep testing with oral appliance which showed oxygen desaturation and then TELLY of 4.3.    DME provider: [facility]   Device: Airsense 10  Mask: resmed p30i nasal pillow   Aerophagia: No   Snoring: No   Dry mouth: No   Leak: No   Skin irritation: No   Chin strap: No     Sleep History  originally diagnosed with GÓMEZ at [place], [place] in 2014 with an AHI of 11.7.    Patient Active Problem List    Diagnosis Date Noted   • Hypertriglyceridemia 04/27/2020   • Gingivitis 09/18/2019   • Foraminal stenosis of cervical region 06/03/2019   • Trigger ring finger of right hand 12/13/2018   • Cough variant asthma 10/16/2018   • Intractable migraine without aura and without status migrainosus 10/16/2018   • Primary insomnia 10/16/2018   • Bilateral patellofemoral syndrome 10/16/2018   • Hx of total hysterectomy 10/16/2018   • Vaginal atrophy 10/16/2018   • Obstructive sleep apnea syndrome 09/12/2014   • History of radial keratotomy 12/08/2010   • Acquired absence of other genital organ(s) 12/22/2008   • Mixed hyperlipidemia 09/21/2007   • Diverticulosis of colon 10/20/2006   • Allergic rhinitis 01/15/2003       Past Medical History:   Diagnosis  Date   • Asthma     cough equivalent   • Dry eyes    • Frequent headaches    • Gingivitis    • History of hormone therapy    • Hyperlipidemia    • Knee pain    • Overweight    • Shoulder injury    • Sleep disorder         Past Surgical History:   Procedure Laterality Date   • EYE SURGERY      right cataract remobal/lens placement       Family History   Problem Relation Age of Onset   • Cancer Mother         breast       Social History     Socioeconomic History   • Marital status:      Spouse name: Not on file   • Number of children: Not on file   • Years of education: Not on file   • Highest education level: Professional school degree (e.g., MD, DDS, DVM, FRANK)   Occupational History   • Not on file   Tobacco Use   • Smoking status: Never Smoker   • Smokeless tobacco: Never Used   Vaping Use   • Vaping Use: Never used   Substance and Sexual Activity   • Alcohol use: Yes     Comment: 2-3 drinks a week    • Drug use: Yes     Types: Marijuana     Comment: indica tablets for sleep   • Sexual activity: Not Currently   Other Topics Concern   •  Service No   • Blood Transfusions No   • Caffeine Concern No   • Occupational Exposure No   • Hobby Hazards Yes   • Sleep Concern Yes   • Stress Concern No   • Weight Concern Yes   • Special Diet No   • Back Care No   • Exercise Yes   • Bike Helmet No   • Seat Belt Yes   • Self-Exams Yes   Social History Narrative   • Not on file     Social Determinants of Health     Financial Resource Strain: Low Risk    • Difficulty of Paying Living Expenses: Not hard at all   Food Insecurity: No Food Insecurity   • Worried About Running Out of Food in the Last Year: Never true   • Ran Out of Food in the Last Year: Never true   Transportation Needs: No Transportation Needs   • Lack of Transportation (Medical): No   • Lack of Transportation (Non-Medical): No   Physical Activity: Sufficiently Active   • Days of Exercise per Week: 5 days   • Minutes of Exercise per Session: 30 min    Stress: No Stress Concern Present   • Feeling of Stress : Only a little   Social Connections: Moderately Integrated   • Frequency of Communication with Friends and Family: More than three times a week   • Frequency of Social Gatherings with Friends and Family: More than three times a week   • Attends Alevism Services: More than 4 times per year   • Active Member of Clubs or Organizations: Yes   • Attends Club or Organization Meetings: More than 4 times per year   • Marital Status:    Intimate Partner Violence:    • Fear of Current or Ex-Partner: Not on file   • Emotionally Abused: Not on file   • Physically Abused: Not on file   • Sexually Abused: Not on file   Housing Stability: Low Risk    • Unable to Pay for Housing in the Last Year: No   • Number of Places Lived in the Last Year: 1   • Unstable Housing in the Last Year: No       Current Outpatient Medications   Medication Sig Dispense Refill   • fenofibrate (TRICOR) 145 MG Tab TAKE 1 TABLET DAILY 90 Tablet 3   • budesonide-formoterol (SYMBICORT) 160-4.5 MCG/ACT Aerosol Inhale 2 Puffs 2 Times a Day. 1 Each 2   • Estradiol 0.025 MG/24HR PATCH BIWEEKLY APPLY 1 PATCH TWICE WEEKLY AS DIRECTED 24 Patch 3   • estradiol (VAGIFEM) 10 MCG Tab insert 1 tablet vaginally once daily as directed 30 Tab 2   • Coenzyme Q10 (COQ10) 100 MG Cap Take  by mouth.     • Multiple Vitamins-Minerals (DRY EYE FORMULA) Cap Take  by mouth.     • CALCIUM-MAGNESIUM PO Take  by mouth.       No current facility-administered medications for this visit.        ALLERGIES: Codeine and Penicillins    ROS  Constitutional: Denies fever, chills, sweats,  weight loss, fatigue  Cardiovascular: Denies chest pain, tightness, palpitations, swelling in legs/feet  Respiratory: Denies shortness of breath, cough, sputum, wheezing, painful breathing   Sleep: per HPI  Gastrointestinal: Denies  difficulty swallowing, nausea, abdominal pain, diarrhea, constipation, heartburn.  Musculoskeletal: Denies  "painful joints, sore muscles,       PHYSICAL EXAM  BP (!) 90/60 (BP Location: Right arm, Patient Position: Sitting, BP Cuff Size: Adult)   Pulse (!) 58   Temp 36.4 °C (97.6 °F) (Temporal)   Resp 14   Ht 1.575 m (5' 2\")   Wt 63.5 kg (140 lb)   SpO2 98%   Breastfeeding No   BMI 25.61 kg/m²   Appearance: Well-nourished, well-developed, no acute distress  Eyes:  No scleral icterus , EOMI  ENMT: masked  Musculoskeletal:  Grossly normal; gait and station normal; digits and nails normal  Skin:  No rashes, petechiae, cyanosis  Neurologic: without focal signs; oriented to person, time, place, and purpose; judgement intact      Medical Decision Making   Assessment and Plan  Audrey Aquino is a 71 y.o.female  with hyperlipidemia, obstructive sleep apnea on CPAP and history of migraines.  Presents sleep clinic today to follow-up for GÓMEZ management.      The medical record was reviewed.    Obstructive sleep apnea  Compliance data reviewed showing 100% usage > 4hours in last 30  days. Average AHI 0.3 events/hour. 90-95% pressure 13.7 CWP. Pt continues to use and benefit from machine.   Auto CPAP 5-15 cmH2O    Has been noted to have nocturnal hypoxia with using oral appliance.  Appliance was recently adjusted and she would benefit from overnight pulse oximetry to reassess nocturnal hypoxia on oral appliance.    PLAN:   -Order placed for mask and supplies   -Order placed for OPO to be done with oral appliance  -We will plan to follow-up in 1 year or earlier if needed.  -Advised to reach out via MyChart with questions     Has been advised to continue the current  CPAP, clean equipment frequently, and get new mask and supplies as allowed by insurance and DME. Recommend an earlier appointment, if significant treatment barriers develop.    The risks of untreated sleep apnea were discussed with the patient at length. Patients with GÓMEZ are at increased risk of cardiovascular disease including coronary artery disease, systemic " arterial hypertension, pulmonary arterial hypertension, cardiac arrythmias, and stroke. The patient was advised to avoid driving a motor vehicle when drowsy.    Positive airway pressure will favorably impact many of the adverse conditions and effects provoked by GÓMEZ.    Have advised the patient to follow up with the appropriate healthcare practitioners for all other medical problems and issues.    Return in about 1 year (around 11/10/2022).    Please note portions of this record was created using voice recognition software. I have made every reasonable attempt to correct obvious errors, but I expect that there are errors of grammar and possibly content I did not discover before finalizing the note.

## 2021-11-10 NOTE — PATIENT INSTRUCTIONS
"CPAP and BPAP Information  CPAP and BPAP are methods of helping a person breathe with the use of air pressure. CPAP stands for \"continuous positive airway pressure.\" BPAP stands for \"bi-level positive airway pressure.\" In both methods, air is blown through your nose or mouth and into your air passages to help you breathe well.  CPAP and BPAP use different amounts of pressure to blow air. With CPAP, the amount of pressure stays the same while you breathe in and out. With BPAP, the amount of pressure is increased when you breathe in (inhale) so that you can take larger breaths. Your health care provider will recommend whether CPAP or BPAP would be more helpful for you.  Why are CPAP and BPAP treatments used?  CPAP or BPAP can be helpful if you have:  · Sleep apnea.  · Chronic obstructive pulmonary disease (COPD).  · Heart failure.  · Medical conditions that weaken the muscles of the chest including muscular dystrophy, or neurological diseases such as amyotrophic lateral sclerosis (ALS).  · Other problems that cause breathing to be weak, abnormal, or difficult.  CPAP is most commonly used for obstructive sleep apnea (GÓMEZ) to keep the airways from collapsing when the muscles relax during sleep.  How is CPAP or BPAP administered?  Both CPAP and BPAP are provided by a small machine with a flexible plastic tube that attaches to a plastic mask. You wear the mask. Air is blown through the mask into your nose or mouth. The amount of pressure that is used to blow the air can be adjusted on the machine. Your health care provider will determine the pressure setting that should be used based on your individual needs.  When should CPAP or BPAP be used?  In most cases, the mask only needs to be worn during sleep. Generally, the mask needs to be worn throughout the night and during any daytime naps. People with certain medical conditions may also need to wear the mask at other times when they are awake. Follow instructions from your " health care provider about when to use the machine.  What are some tips for using the mask?    · Because the mask needs to be snug, some people feel trapped or closed-in (claustrophobic) when first using the mask. If you feel this way, you may need to get used to the mask. One way to do this is by holding the mask loosely over your nose or mouth and then gradually applying the mask more snugly. You can also gradually increase the amount of time that you use the mask.  · Masks are available in various types and sizes. Some fit over your mouth and nose while others fit over just your nose. If your mask does not fit well, talk with your health care provider about getting a different one.  · If you are using a mask that fits over your nose and you tend to breathe through your mouth, a chin strap may be applied to help keep your mouth closed.  · The CPAP and BPAP machines have alarms that may sound if the mask comes off or develops a leak.  · If you have trouble with the mask, it is very important that you talk with your health care provider about finding a way to make the mask easier to tolerate. Do not stop using the mask. Stopping the use of the mask could have a negative impact on your health.  What are some tips for using the machine?  · Place your CPAP or BPAP machine on a secure table or stand near an electrical outlet.  · Know where the on/off switch is located on the machine.  · Follow instructions from your health care provider about how to set the pressure on your machine and when you should use it.  · Do not eat or drink while the CPAP or BPAP machine is on. Food or fluids could get pushed into your lungs by the pressure of the CPAP or BPAP.  · Do not smoke. Tobacco smoke residue can damage the machine.  · For home use, CPAP and BPAP machines can be rented or purchased through home health care companies. Many different brands of machines are available. Renting a machine before purchasing may help you find out  which particular machine works well for you.  · Keep the CPAP or BPAP machine and attachments clean. Ask your health care provider for specific instructions.  Get help right away if:  · You have redness or open areas around your nose or mouth where the mask fits.  · You have trouble using the CPAP or BPAP machine.  · You cannot tolerate wearing the CPAP or BPAP mask.  · You have pain, discomfort, and bloating in your abdomen.  Summary  · CPAP and BPAP are methods of helping a person breathe with the use of air pressure.  · Both CPAP and BPAP are provided by a small machine with a flexible plastic tube that attaches to a plastic mask.  · If you have trouble with the mask, it is very important that you talk with your health care provider about finding a way to make the mask easier to tolerate.  This information is not intended to replace advice given to you by your health care provider. Make sure you discuss any questions you have with your health care provider.  Document Released: 09/15/2005 Document Revised: 04/08/2020 Document Reviewed: 11/06/2017  Elsevier Patient Education © 2020 [employer]

## 2022-01-13 DIAGNOSIS — N95.2 POSTMENOPAUSE ATROPHIC VAGINITIS: ICD-10-CM

## 2022-01-13 DIAGNOSIS — F51.01 PRIMARY INSOMNIA: ICD-10-CM

## 2022-01-14 ENCOUNTER — HOME STUDY (OUTPATIENT)
Dept: SLEEP MEDICINE | Facility: MEDICAL CENTER | Age: 72
End: 2022-01-14
Attending: STUDENT IN AN ORGANIZED HEALTH CARE EDUCATION/TRAINING PROGRAM
Payer: MEDICARE

## 2022-01-14 DIAGNOSIS — G47.33 OSA (OBSTRUCTIVE SLEEP APNEA): ICD-10-CM

## 2022-01-14 PROCEDURE — G0399 HOME SLEEP TEST/TYPE 3 PORTA: HCPCS | Performed by: STUDENT IN AN ORGANIZED HEALTH CARE EDUCATION/TRAINING PROGRAM

## 2022-01-14 RX ORDER — ESTRADIOL 0.03 MG/D
FILM, EXTENDED RELEASE TRANSDERMAL
Qty: 24 PATCH | Refills: 0 | Status: SHIPPED | OUTPATIENT
Start: 2022-01-14 | End: 2022-04-08

## 2022-01-19 NOTE — PROCEDURES
DIAGNOSTIC HOME SLEEP TEST (HST) REPORT       PATIENT ID:  NAME:  Audrey Aquino  MRN:               [id number]  YOB: 1950  DATE OF STUDY: 1/14/2022      Impression:     This study shows evidence of:     1.  Study did not meet criteria for obstructive sleep apnea with  Respiratory Event Index (TELLY) of 1.0 per hour (less than 5 normal). These findings are based on the recording time (flow evaluation time). It is not possible with this device to determine a traditional apnea+hypopnea index (AHI) for total sleep time since EEG channels are not available.     2. Oxygenation O2 Sat. da was 87% and mean O2 sat was 91% and baseline O2 at 94 %. O2 sat was below 88% for 6 min of the flow evaluation time. Oxygen Desaturation (>= 4%) Index was  1.3/hr. AVG HR was 50 BPM.      TECHNICAL DESCRIPTION: [employer] apnea link air device used was a type-III home study device. Home sleep study recording included: Airflow recording by nasal pressure transducer; Respiratory Effort by 1 RIP Band; Oxygen Saturation and heart rate by finger oximetry. A position sensor and a snore channel was also used.    Scoring Criteria: A modification of the the AASM Manual for the Scoring of Sleep and Associated Events, 2012, was used.   Obstructive apnea was scored by cessation of airflow for at least 10 seconds with continuing respiratory effort.  Central apnea was scored by cessation of airflow for at least 10 seconds with no effort.  Hypopnea was scored by a 30% or more reduction in airflow for at least 10 seconds accompanied by an arterial oxygen desaturation of 3% or more.  (For Medicare patients, hypopneas were scored by a 30% or more reduction in airflow for at least 10 seconds accompanied by an arterial oxygen saturation of 4% or more, as required by their insurance, CMS.        General sleep summary: . Total recording time is 7 hours and 17 minutes and total flow evaluation time is 7 hours and 5 minutes. The patient  spent 1 hours and 46 minutes in the supine position and 5 hours and 14 minutes in the nonsupine position.    Respiratory events:    Apneas: Total 0 (Obstructive apnea index 0/hr, Central apnea index 0 /hr, mixed 0 /hour)  Hypopneas: Total 7 with a Hypopnea index of 1.0 /hr    Recommendations:    1.  Home sleep study did not indicate obstructive sleep apnea at this time.  Patient likely wearing home oral appliance.  Overall AHI of 1.0.  Normal is less than 5 bradycardia events an hour.    2.  Oxygen levels were decreased at times during study.  With minimal oxygen saturation of 87% and time at/below 88% saturation of 6 minutes.  In reviewing data more closely these lower oxygen readings for at times a position change likely secondary to movement which would not be due to difficulty breathing.  Average oxygen saturation at 91%.  Would not currently recommend supplemental oxygen based off of this home study as detailed data in general trend of the night does not suggest nocturnal hypoxia.     In general patients with sleep apnea are advised to avoid alcohol and sedatives and to not operate a motor vehicle while drowsy. In some cases alternative treatment options may prove effective in resolving sleep apnea in these options include upper airway surgery, the use of a dental orthotic or weight loss and positional therapy. Clinical correlation is required.         Wilson Bee MD

## 2022-03-30 ENCOUNTER — PATIENT MESSAGE (OUTPATIENT)
Dept: MEDICAL GROUP | Facility: MEDICAL CENTER | Age: 72
End: 2022-03-30
Payer: MEDICARE

## 2022-03-30 DIAGNOSIS — Z13.0 SCREENING FOR DEFICIENCY ANEMIA: ICD-10-CM

## 2022-03-30 DIAGNOSIS — E78.1 HYPERTRIGLYCERIDEMIA: ICD-10-CM

## 2022-03-30 DIAGNOSIS — J45.20 MILD INTERMITTENT ASTHMA WITHOUT COMPLICATION: ICD-10-CM

## 2022-03-30 DIAGNOSIS — G47.33 OBSTRUCTIVE SLEEP APNEA SYNDROME: ICD-10-CM

## 2022-03-31 ENCOUNTER — PATIENT MESSAGE (OUTPATIENT)
Dept: MEDICAL GROUP | Facility: MEDICAL CENTER | Age: 72
End: 2022-03-31

## 2022-03-31 ENCOUNTER — OFFICE VISIT (OUTPATIENT)
Dept: PHYSICAL MEDICINE AND REHAB | Facility: MEDICAL CENTER | Age: 72
End: 2022-03-31
Payer: MEDICARE

## 2022-03-31 VITALS
WEIGHT: 149 LBS | TEMPERATURE: 96.8 F | OXYGEN SATURATION: 99 % | SYSTOLIC BLOOD PRESSURE: 90 MMHG | HEART RATE: 59 BPM | BODY MASS INDEX: 27.42 KG/M2 | HEIGHT: 62 IN | DIASTOLIC BLOOD PRESSURE: 60 MMHG

## 2022-03-31 DIAGNOSIS — M54.42 ACUTE BILATERAL LOW BACK PAIN WITH LEFT-SIDED SCIATICA: ICD-10-CM

## 2022-03-31 DIAGNOSIS — R29.898 WEAKNESS OF LEFT LEG: ICD-10-CM

## 2022-03-31 DIAGNOSIS — Z78.9 IMPAIRED MOBILITY AND ADLS: ICD-10-CM

## 2022-03-31 DIAGNOSIS — Z78.0 ASYMPTOMATIC MENOPAUSAL STATE: ICD-10-CM

## 2022-03-31 DIAGNOSIS — M54.16 LUMBAR RADICULOPATHY: ICD-10-CM

## 2022-03-31 DIAGNOSIS — Z74.09 IMPAIRED MOBILITY AND ADLS: ICD-10-CM

## 2022-03-31 PROCEDURE — 99214 OFFICE O/P EST MOD 30 MIN: CPT | Performed by: PHYSICAL MEDICINE & REHABILITATION

## 2022-03-31 RX ORDER — GABAPENTIN 100 MG/1
100 CAPSULE ORAL 3 TIMES DAILY PRN
Qty: 90 CAPSULE | Refills: 5 | Status: SHIPPED | OUTPATIENT
Start: 2022-03-31 | End: 2022-04-27

## 2022-03-31 ASSESSMENT — PATIENT HEALTH QUESTIONNAIRE - PHQ9: CLINICAL INTERPRETATION OF PHQ2 SCORE: 0

## 2022-03-31 ASSESSMENT — FIBROSIS 4 INDEX: FIB4 SCORE: 1.31

## 2022-03-31 ASSESSMENT — PAIN SCALES - GENERAL: PAINLEVEL: 6=MODERATE PAIN

## 2022-03-31 NOTE — PROGRESS NOTES
Follow up patient note  Interventional spine and sports physiatry, Physical medicine rehabilitation      Chief complaint:   Chief Complaint   Patient presents with   • Follow-Up     Back pain         HISTORY    Please see new patient note by Dr Estrada,  for more details.     HPI  Patient identification: Audrey Aquion ,  1950,   With Diagnoses of Acute bilateral low back pain with left-sided sciatica, Lumbar radiculopathy, Weakness of left leg, and Impaired mobility and ADLs were pertinent to this visit.     2 months ago The patient lifted her grandson who is 20 pounds and felt a pp and acute low back pain which was severe in intensity and worse on the left leg and buttocks. Constant pain. 6/10 in intensity. This can also radiate to the posterior left buttocks. She has difficulty with ADLs including rising from a seated position.     She has been working with her physical therapist and her  and continues to have severe pain.     Medications tried include tylenol. She cannot take NSAIDs because of renal function.       ROS Red Flags :   Fever, Chills, Sweats: Denies  Involuntary Weight Loss: Denies  Bowel/Bladder Incontinence: Denies  Saddle Anesthesia: Denies        PMHx:   Past Medical History:   Diagnosis Date   • Asthma     cough equivalent   • Dry eyes    • Frequent headaches    • Gingivitis    • History of hormone therapy    • Hyperlipidemia    • Knee pain    • Overweight    • Shoulder injury    • Sleep disorder        PSHx:   Past Surgical History:   Procedure Laterality Date   • EYE SURGERY      right cataract remobal/lens placement       Family history   Family History   Problem Relation Age of Onset   • Cancer Mother         breast         Medications:   Outpatient Medications Marked as Taking for the 3/31/22 encounter (Office Visit) with Ru Estrada M.D.   Medication Sig Dispense Refill   • gabapentin (NEURONTIN) 100 MG Cap Take 1 Capsule by mouth 3 times a day as needed (pain). 90  "Capsule 5   • Estradiol 0.025 MG/24HR PATCH BIWEEKLY APPLY 1 PATCH TWICE WEEKLY AS DIRECTED 24 Patch 0   • fenofibrate (TRICOR) 145 MG Tab TAKE 1 TABLET DAILY 90 Tablet 3   • budesonide-formoterol (SYMBICORT) 160-4.5 MCG/ACT Aerosol Inhale 2 Puffs 2 Times a Day. 1 Each 2   • estradiol (VAGIFEM) 10 MCG Tab insert 1 tablet vaginally once daily as directed 30 Tab 2   • Coenzyme Q10 (COQ10) 100 MG Cap Take  by mouth.     • Multiple Vitamins-Minerals (DRY EYE FORMULA) Cap Take  by mouth.     • CALCIUM-MAGNESIUM PO Take  by mouth.          Current Outpatient Medications on File Prior to Visit   Medication Sig Dispense Refill   • Estradiol 0.025 MG/24HR PATCH BIWEEKLY APPLY 1 PATCH TWICE WEEKLY AS DIRECTED 24 Patch 0   • fenofibrate (TRICOR) 145 MG Tab TAKE 1 TABLET DAILY 90 Tablet 3   • budesonide-formoterol (SYMBICORT) 160-4.5 MCG/ACT Aerosol Inhale 2 Puffs 2 Times a Day. 1 Each 2   • estradiol (VAGIFEM) 10 MCG Tab insert 1 tablet vaginally once daily as directed 30 Tab 2   • Coenzyme Q10 (COQ10) 100 MG Cap Take  by mouth.     • Multiple Vitamins-Minerals (DRY EYE FORMULA) Cap Take  by mouth.     • CALCIUM-MAGNESIUM PO Take  by mouth.       No current facility-administered medications on file prior to visit.         Allergies:   Allergies   Allergen Reactions   • Penicillins      Never taken but has family history of anaphylaxis death   • Codeine      \"Makes me sick\"       Social Hx:   Social History     Socioeconomic History   • Marital status:      Spouse name: Not on file   • Number of children: Not on file   • Years of education: Not on file   • Highest education level: Professional school degree (e.g., MD, DDS, DVM, FRANK)   Occupational History   • Not on file   Tobacco Use   • Smoking status: Never Smoker   • Smokeless tobacco: Never Used   Vaping Use   • Vaping Use: Never used   Substance and Sexual Activity   • Alcohol use: Yes     Comment: 2-3 drinks a week    • Drug use: Yes     Types: Marijuana     " "Comment: indica tablets for sleep   • Sexual activity: Not Currently   Other Topics Concern   •  Service No   • Blood Transfusions No   • Caffeine Concern No   • Occupational Exposure No   • Hobby Hazards Yes   • Sleep Concern Yes   • Stress Concern No   • Weight Concern Yes   • Special Diet No   • Back Care No   • Exercise Yes   • Bike Helmet No   • Seat Belt Yes   • Self-Exams Yes   Social History Narrative   • Not on file     Social Determinants of Health     Financial Resource Strain: Not on file   Food Insecurity: Not on file   Transportation Needs: Not on file   Physical Activity: Not on file   Stress: Not on file   Social Connections: Not on file   Intimate Partner Violence: Not on file   Housing Stability: Not on file         EXAMINATION     Physical Exam:   Vitals: BP (!) 90/60 (BP Location: Right arm, Patient Position: Sitting, BP Cuff Size: Adult)   Pulse (!) 59   Temp 36 °C (96.8 °F) (Temporal)   Ht 1.575 m (5' 2\")   Wt 67.6 kg (149 lb)   SpO2 99%     Constitutional:   Body Habitus: Body mass index is 27.25 kg/m².  Cooperation: Fully cooperates with exam  Appearance: Well-groomed no disheveled    Respiratory-  breathing comfortable on room air, no audible wheezing  Cardiovascular- capillary refills less than 2 seconds. No lower extremity edema is noted.   Psychiatric- alert and oriented ×3. Normal affect.    MSK and Neuro: -      Thoracic/Lumbar Spine/Sacral Spine/Hips   full  active range of motion with flexion, lateral flexion, and rotation bilaterally.   There is full  active range of motion with lumbar extension.      Palpation:   No tenderness to palpation in midline at T1-T12 levels. No tenderness to palpation in the left and right of the midline T1-L5, NEGATIVE for tenderness to palpation to the para-midline region in the lower lumbar levels.  palpation over SI joint: negative bilaterally    palpation in hip or over the gluteus medius tendon insertion: negative bilaterally      Lumbar " spine Special tests  Neuro tension  Straight leg test negative right, positive left         Key points for the international standards for neurological classification of spinal cord injury (ISNCSCI) to light touch.     Dermatome R L                                      L2 2 2   L3 2 2   L4 2 2   L5 2 2   S1 2 2   S2 2 2         Motor Exam Lower Extremities    ? Myotome R L   Hip flexion L2 5 5   Knee extension L3 5 5   Ankle dorsiflexion L4 5 5   Toe extension L5 5 4   Ankle plantarflexion S1 5 5                   MEDICAL DECISION MAKING    DATA    Labs:   Lab Results   Component Value Date/Time    SODIUM 142 08/18/2021 10:28 AM    POTASSIUM 4.2 08/18/2021 10:28 AM    CHLORIDE 105 08/18/2021 10:28 AM    CO2 24 08/18/2021 10:28 AM    GLUCOSE 87 08/18/2021 10:28 AM    BUN 15 08/18/2021 10:28 AM    CREATININE 0.84 08/18/2021 10:28 AM        No results found for: PROTHROMBTM, INR     Lab Results   Component Value Date/Time    WBC 5.3 08/18/2021 10:28 AM    RBC 4.85 08/18/2021 10:28 AM    HEMOGLOBIN 14.1 08/18/2021 10:28 AM    HEMATOCRIT 42.9 08/18/2021 10:28 AM    MCV 88.5 08/18/2021 10:28 AM    MCH 29.1 08/18/2021 10:28 AM    MCHC 32.9 (L) 08/18/2021 10:28 AM    MPV 10.3 08/18/2021 10:28 AM    NEUTSPOLYS 50.70 08/18/2021 10:28 AM    LYMPHOCYTES 37.80 08/18/2021 10:28 AM    MONOCYTES 8.60 08/18/2021 10:28 AM    EOSINOPHILS 2.10 08/18/2021 10:28 AM    BASOPHILS 0.40 08/18/2021 10:28 AM        No results found for: HBA1C       Imaging:   I personally reviewed following images       MRI cervical spine 6/6/2019  Moderate right C3-4 neuroforaminal stenosis.  Moderate bilateral C5-6 neuroforaminal stenosis.  Facet arthropathy is present right worse than left C3-4, C4-5, C5-6.     X-ray bilateral knees 8/23/2019  Minimal bilateral osteoarthritis of the knees.    I reviewed the following radiology reports                 Results for orders placed during the hospital encounter of 06/06/19    MR-CERVICAL  SPINE-W/O    Impression  1.  Multilevel degenerative disc disease, uncinate and facet degeneration. There are varying degrees of neural foraminal narrowing at levels as specifically described above. No significant central canal narrowing.    2.  Loss of the normal cervical lordosis.                                        Results for orders placed during the hospital encounter of 19    DX-CERVICAL SPINE-4+ VIEWS    Impression  1.  There is degenerative disease and arthropathy predominantly at the C5-6 and C6-7 levels.  2.  The alignment is normal. There is no abnormal motion.                     Results for orders placed during the hospital encounter of 19    DX-KNEE 3 VIEWS RIGHT    Impression  Minimal tricompartmental osteoarthrosis bilaterally. No erosions.   Results for orders placed during the hospital encounter of 19    DX-KNEES-AP BILATERAL STANDING    Impression  Minimal tricompartmental osteoarthrosis bilaterally. No erosions.                           DIAGNOSIS   Visit Diagnoses     ICD-10-CM   1. Acute bilateral low back pain with left-sided sciatica  M54.42   2. Lumbar radiculopathy  M54.16   3. Weakness of left leg  R29.898   4. Impaired mobility and ADLs  Z74.09    Z78.9         ASSESSMENT and PLAN:     Audrey Aquino  1950 female      Audrey was seen today for follow-up.    Diagnoses and all orders for this visit:    Acute bilateral low back pain with left-sided sciatica  -     DX-LUMBAR SPINE-2 OR 3 VIEWS; Future  -     MR-LUMBAR SPINE-W/O; Future  -     gabapentin (NEURONTIN) 100 MG Cap; Take 1 Capsule by mouth 3 times a day as needed (pain).  -     Referral to Physical Therapy    Lumbar radiculopathy  -     DX-LUMBAR SPINE-2 OR 3 VIEWS; Future  -     MR-LUMBAR SPINE-W/O; Future  -     gabapentin (NEURONTIN) 100 MG Cap; Take 1 Capsule by mouth 3 times a day as needed (pain).  -     Referral to Physical Therapy    Weakness of left leg  -     DX-LUMBAR SPINE-2 OR 3 VIEWS;  Future  -     MR-LUMBAR SPINE-W/O; Future    Impaired mobility and ADLs  -     DX-LUMBAR SPINE-2 OR 3 VIEWS; Future  -     MR-LUMBAR SPINE-W/O; Future        Failed conservative treatments.     I would consider the patient for an epidural steroid injection depending on the results of the above.    Follow up: After the above imaging studies    Thank you for allowing me to participate in the care of this patient. If you have any questions please not hesitate to contact me.             Please note that this dictation was created using voice recognition software. I have made every reasonable attempt to correct obvious errors but there may be errors of grammar and content that I may have overlooked prior to finalization of this note.      Ru Estrada MD  Interventional Spine and Sports Physiatry  Physical Medicine and Rehabilitation  [facility]

## 2022-04-05 ENCOUNTER — PATIENT MESSAGE (OUTPATIENT)
Dept: MEDICAL GROUP | Facility: MEDICAL CENTER | Age: 72
End: 2022-04-05
Payer: MEDICARE

## 2022-04-06 ENCOUNTER — HOSPITAL ENCOUNTER (OUTPATIENT)
Dept: RADIOLOGY | Facility: MEDICAL CENTER | Age: 72
End: 2022-04-06
Attending: PHYSICAL MEDICINE & REHABILITATION
Payer: MEDICARE

## 2022-04-06 ENCOUNTER — HOSPITAL ENCOUNTER (OUTPATIENT)
Dept: LAB | Facility: MEDICAL CENTER | Age: 72
End: 2022-04-06
Attending: FAMILY MEDICINE
Payer: MEDICARE

## 2022-04-06 DIAGNOSIS — R29.898 WEAKNESS OF LEFT LEG: ICD-10-CM

## 2022-04-06 DIAGNOSIS — E78.1 HYPERTRIGLYCERIDEMIA: ICD-10-CM

## 2022-04-06 DIAGNOSIS — M54.16 LUMBAR RADICULOPATHY: ICD-10-CM

## 2022-04-06 DIAGNOSIS — Z74.09 IMPAIRED MOBILITY AND ADLS: ICD-10-CM

## 2022-04-06 DIAGNOSIS — J45.20 MILD INTERMITTENT ASTHMA WITHOUT COMPLICATION: ICD-10-CM

## 2022-04-06 DIAGNOSIS — M54.42 ACUTE BILATERAL LOW BACK PAIN WITH LEFT-SIDED SCIATICA: ICD-10-CM

## 2022-04-06 DIAGNOSIS — Z78.9 IMPAIRED MOBILITY AND ADLS: ICD-10-CM

## 2022-04-06 DIAGNOSIS — G47.33 OBSTRUCTIVE SLEEP APNEA SYNDROME: ICD-10-CM

## 2022-04-06 DIAGNOSIS — Z13.0 SCREENING FOR DEFICIENCY ANEMIA: ICD-10-CM

## 2022-04-06 LAB
ALBUMIN SERPL BCP-MCNC: 4.7 G/DL (ref 3.2–4.9)
ALBUMIN/GLOB SERPL: 2 G/DL
ALP SERPL-CCNC: 79 U/L (ref 30–99)
ALT SERPL-CCNC: 18 U/L (ref 2–50)
ANION GAP SERPL CALC-SCNC: 11 MMOL/L (ref 7–16)
AST SERPL-CCNC: 23 U/L (ref 12–45)
BASOPHILS # BLD AUTO: 0.4 % (ref 0–1.8)
BASOPHILS # BLD: 0.02 K/UL (ref 0–0.12)
BILIRUB SERPL-MCNC: 0.5 MG/DL (ref 0.1–1.5)
BUN SERPL-MCNC: 23 MG/DL (ref 8–22)
CALCIUM SERPL-MCNC: 9.6 MG/DL (ref 8.5–10.5)
CHLORIDE SERPL-SCNC: 103 MMOL/L (ref 96–112)
CHOLEST SERPL-MCNC: 195 MG/DL (ref 100–199)
CO2 SERPL-SCNC: 24 MMOL/L (ref 20–33)
CREAT SERPL-MCNC: 0.84 MG/DL (ref 0.5–1.4)
EOSINOPHIL # BLD AUTO: 0.08 K/UL (ref 0–0.51)
EOSINOPHIL NFR BLD: 1.7 % (ref 0–6.9)
ERYTHROCYTE [DISTWIDTH] IN BLOOD BY AUTOMATED COUNT: 42 FL (ref 35.9–50)
FASTING STATUS PATIENT QL REPORTED: NORMAL
GFR SERPLBLD CREATININE-BSD FMLA CKD-EPI: 74 ML/MIN/1.73 M 2
GLOBULIN SER CALC-MCNC: 2.3 G/DL (ref 1.9–3.5)
GLUCOSE SERPL-MCNC: 93 MG/DL (ref 65–99)
HCT VFR BLD AUTO: 42.7 % (ref 37–47)
HDLC SERPL-MCNC: 77 MG/DL
HGB BLD-MCNC: 14.3 G/DL (ref 12–16)
IMM GRANULOCYTES # BLD AUTO: 0.02 K/UL (ref 0–0.11)
IMM GRANULOCYTES NFR BLD AUTO: 0.4 % (ref 0–0.9)
LDLC SERPL CALC-MCNC: 104 MG/DL
LYMPHOCYTES # BLD AUTO: 1.78 K/UL (ref 1–4.8)
LYMPHOCYTES NFR BLD: 37.3 % (ref 22–41)
MCH RBC QN AUTO: 28.8 PG (ref 27–33)
MCHC RBC AUTO-ENTMCNC: 33.5 G/DL (ref 33.6–35)
MCV RBC AUTO: 85.9 FL (ref 81.4–97.8)
MONOCYTES # BLD AUTO: 0.42 K/UL (ref 0–0.85)
MONOCYTES NFR BLD AUTO: 8.8 % (ref 0–13.4)
NEUTROPHILS # BLD AUTO: 2.45 K/UL (ref 2–7.15)
NEUTROPHILS NFR BLD: 51.4 % (ref 44–72)
NRBC # BLD AUTO: 0 K/UL
NRBC BLD-RTO: 0 /100 WBC
PLATELET # BLD AUTO: 353 K/UL (ref 164–446)
PMV BLD AUTO: 9.8 FL (ref 9–12.9)
POTASSIUM SERPL-SCNC: 4.3 MMOL/L (ref 3.6–5.5)
PROT SERPL-MCNC: 7 G/DL (ref 6–8.2)
RBC # BLD AUTO: 4.97 M/UL (ref 4.2–5.4)
SODIUM SERPL-SCNC: 138 MMOL/L (ref 135–145)
TRIGL SERPL-MCNC: 68 MG/DL (ref 0–149)
TSH SERPL DL<=0.005 MIU/L-ACNC: 3.31 UIU/ML (ref 0.38–5.33)
WBC # BLD AUTO: 4.8 K/UL (ref 4.8–10.8)

## 2022-04-06 PROCEDURE — 72148 MRI LUMBAR SPINE W/O DYE: CPT | Mod: ME

## 2022-04-06 PROCEDURE — 84443 ASSAY THYROID STIM HORMONE: CPT

## 2022-04-06 PROCEDURE — 72100 X-RAY EXAM L-S SPINE 2/3 VWS: CPT

## 2022-04-06 PROCEDURE — 80053 COMPREHEN METABOLIC PANEL: CPT

## 2022-04-06 PROCEDURE — 85025 COMPLETE CBC W/AUTO DIFF WBC: CPT

## 2022-04-06 PROCEDURE — 36415 COLL VENOUS BLD VENIPUNCTURE: CPT

## 2022-04-06 PROCEDURE — 80061 LIPID PANEL: CPT

## 2022-04-07 ENCOUNTER — TELEMEDICINE (OUTPATIENT)
Dept: PHYSICAL MEDICINE AND REHAB | Facility: MEDICAL CENTER | Age: 72
End: 2022-04-07
Payer: MEDICARE

## 2022-04-07 VITALS
TEMPERATURE: 97.5 F | DIASTOLIC BLOOD PRESSURE: 60 MMHG | BODY MASS INDEX: 27.42 KG/M2 | WEIGHT: 149 LBS | SYSTOLIC BLOOD PRESSURE: 100 MMHG | HEART RATE: 56 BPM | HEIGHT: 62 IN

## 2022-04-07 DIAGNOSIS — Z78.9 IMPAIRED MOBILITY AND ADLS: ICD-10-CM

## 2022-04-07 DIAGNOSIS — Z74.09 IMPAIRED MOBILITY AND ADLS: ICD-10-CM

## 2022-04-07 DIAGNOSIS — M54.16 LUMBAR RADICULOPATHY: ICD-10-CM

## 2022-04-07 DIAGNOSIS — M17.0 PRIMARY OSTEOARTHRITIS OF BOTH KNEES: ICD-10-CM

## 2022-04-07 DIAGNOSIS — M48.061 SPINAL STENOSIS OF LUMBAR REGION WITHOUT NEUROGENIC CLAUDICATION: ICD-10-CM

## 2022-04-07 DIAGNOSIS — M54.42 ACUTE BILATERAL LOW BACK PAIN WITH LEFT-SIDED SCIATICA: ICD-10-CM

## 2022-04-07 DIAGNOSIS — F51.01 PRIMARY INSOMNIA: ICD-10-CM

## 2022-04-07 DIAGNOSIS — N95.2 POSTMENOPAUSE ATROPHIC VAGINITIS: ICD-10-CM

## 2022-04-07 PROCEDURE — 99214 OFFICE O/P EST MOD 30 MIN: CPT | Mod: 95 | Performed by: PHYSICAL MEDICINE & REHABILITATION

## 2022-04-07 ASSESSMENT — FIBROSIS 4 INDEX: FIB4 SCORE: 1.08

## 2022-04-07 ASSESSMENT — PAIN SCALES - GENERAL: PAINLEVEL: 6=MODERATE PAIN

## 2022-04-07 ASSESSMENT — PATIENT HEALTH QUESTIONNAIRE - PHQ9: CLINICAL INTERPRETATION OF PHQ2 SCORE: 0

## 2022-04-07 NOTE — PROGRESS NOTES
Follow up patient note  Interventional spine and sports physiatry, Physical medicine rehabilitation      Chief complaint:   Chief Complaint   Patient presents with   • Follow-Up     Back pain         HISTORY    Please see new patient note by Dr Estrada,  for more details.     HPI  Patient identification: Audrey Aquino ,  1950,   With Diagnoses of Lumbar radiculopathy, Acute bilateral low back pain with left-sided sciatica, Impaired mobility and ADLs, Primary osteoarthritis of both knees, and Spinal stenosis of lumbar region without neurogenic claudication were pertinent to this visit.       This encounter was conducted via secure encrypted technology using [employer] videoconferencing.   The patient was in a private location in the [facility]  Verbal consent was obtained. Patient's identity was verified.         Approximately 2 months ago the patient had felt a pop in her back when she was lifting her 20 pound grandson and initially had severe in intensity pain radiating down the left leg and buttocks.  Now this is a moderate pain.  Aching in quality.  She has some pain on the right side which is worse than the left.  She has difficulty with some ADLs including rising from a seated position.      She has been working with her physical therapist and her  and continues to have severe pain.     Medications tried include tylenol. She cannot take NSAIDs because of renal function.       ROS Red Flags :   Fever, Chills, Sweats: Denies  Involuntary Weight Loss: Denies  Bowel/Bladder Incontinence: Denies  Saddle Anesthesia: Denies        PMHx:   Past Medical History:   Diagnosis Date   • Asthma     cough equivalent   • Dry eyes    • Frequent headaches    • Gingivitis    • History of hormone therapy    • Hyperlipidemia    • Knee pain    • Overweight    • Shoulder injury    • Sleep disorder        PSHx:   Past Surgical History:   Procedure Laterality Date   • EYE SURGERY      right cataract remobal/lens placement  "      Family history   Family History   Problem Relation Age of Onset   • Cancer Mother         breast         Medications:   Outpatient Medications Marked as Taking for the 4/7/22 encounter (Telemedicine) with Ru Estrada M.D.   Medication Sig Dispense Refill   • gabapentin (NEURONTIN) 100 MG Cap Take 1 Capsule by mouth 3 times a day as needed (pain). 90 Capsule 5   • Estradiol 0.025 MG/24HR PATCH BIWEEKLY APPLY 1 PATCH TWICE WEEKLY AS DIRECTED 24 Patch 0   • fenofibrate (TRICOR) 145 MG Tab TAKE 1 TABLET DAILY 90 Tablet 3   • budesonide-formoterol (SYMBICORT) 160-4.5 MCG/ACT Aerosol Inhale 2 Puffs 2 Times a Day. 1 Each 2   • estradiol (VAGIFEM) 10 MCG Tab insert 1 tablet vaginally once daily as directed 30 Tab 2   • Coenzyme Q10 (COQ10) 100 MG Cap Take  by mouth.     • Multiple Vitamins-Minerals (DRY EYE FORMULA) Cap Take  by mouth.     • CALCIUM-MAGNESIUM PO Take  by mouth.          Current Outpatient Medications on File Prior to Visit   Medication Sig Dispense Refill   • gabapentin (NEURONTIN) 100 MG Cap Take 1 Capsule by mouth 3 times a day as needed (pain). 90 Capsule 5   • Estradiol 0.025 MG/24HR PATCH BIWEEKLY APPLY 1 PATCH TWICE WEEKLY AS DIRECTED 24 Patch 0   • fenofibrate (TRICOR) 145 MG Tab TAKE 1 TABLET DAILY 90 Tablet 3   • budesonide-formoterol (SYMBICORT) 160-4.5 MCG/ACT Aerosol Inhale 2 Puffs 2 Times a Day. 1 Each 2   • estradiol (VAGIFEM) 10 MCG Tab insert 1 tablet vaginally once daily as directed 30 Tab 2   • Coenzyme Q10 (COQ10) 100 MG Cap Take  by mouth.     • Multiple Vitamins-Minerals (DRY EYE FORMULA) Cap Take  by mouth.     • CALCIUM-MAGNESIUM PO Take  by mouth.       No current facility-administered medications on file prior to visit.         Allergies:   Allergies   Allergen Reactions   • Penicillins      Never taken but has family history of anaphylaxis death   • Codeine      \"Makes me sick\"       Social Hx:   Social History     Socioeconomic History   • Marital status:      " "Spouse name: Not on file   • Number of children: Not on file   • Years of education: Not on file   • Highest education level: Professional school degree (e.g., MD, DDS, DVM, FRANK)   Occupational History   • Not on file   Tobacco Use   • Smoking status: Never Smoker   • Smokeless tobacco: Never Used   Vaping Use   • Vaping Use: Never used   Substance and Sexual Activity   • Alcohol use: Yes     Comment: 2-3 drinks a week    • Drug use: Yes     Types: Marijuana     Comment: indica tablets for sleep   • Sexual activity: Not Currently   Other Topics Concern   •  Service No   • Blood Transfusions No   • Caffeine Concern No   • Occupational Exposure No   • Hobby Hazards Yes   • Sleep Concern Yes   • Stress Concern No   • Weight Concern Yes   • Special Diet No   • Back Care No   • Exercise Yes   • Bike Helmet No   • Seat Belt Yes   • Self-Exams Yes   Social History Narrative   • Not on file     Social Determinants of Health     Financial Resource Strain: Not on file   Food Insecurity: Not on file   Transportation Needs: Not on file   Physical Activity: Not on file   Stress: Not on file   Social Connections: Not on file   Intimate Partner Violence: Not on file   Housing Stability: Not on file         EXAMINATION     Physical Exam:   Vitals: /60 (BP Location: Left arm)   Pulse (!) 56   Temp 36.4 °C (97.5 °F)   Ht 1.575 m (5' 2\")   Wt 67.6 kg (149 lb)     Constitutional:   Body Habitus: Body mass index is 27.25 kg/m².  Cooperation: Fully cooperates with exam  Appearance: Well-groomed no disheveled    Respiratory-  breathing comfortable on room air, no audible wheezing  Psychiatric- alert and oriented ×3. Normal affect.    MSK and Neuro: -  Exam is limited as this is a telehealth visit.              MEDICAL DECISION MAKING    DATA    Labs:   Lab Results   Component Value Date/Time    SODIUM 138 04/06/2022 09:13 AM    POTASSIUM 4.3 04/06/2022 09:13 AM    CHLORIDE 103 04/06/2022 09:13 AM    CO2 24 04/06/2022 " 09:13 AM    GLUCOSE 93 04/06/2022 09:13 AM    BUN 23 (H) 04/06/2022 09:13 AM    CREATININE 0.84 04/06/2022 09:13 AM        No results found for: PROTHROMBTM, INR     Lab Results   Component Value Date/Time    WBC 4.8 04/06/2022 09:13 AM    RBC 4.97 04/06/2022 09:13 AM    HEMOGLOBIN 14.3 04/06/2022 09:13 AM    HEMATOCRIT 42.7 04/06/2022 09:13 AM    MCV 85.9 04/06/2022 09:13 AM    MCH 28.8 04/06/2022 09:13 AM    MCHC 33.5 (L) 04/06/2022 09:13 AM    MPV 9.8 04/06/2022 09:13 AM    NEUTSPOLYS 51.40 04/06/2022 09:13 AM    LYMPHOCYTES 37.30 04/06/2022 09:13 AM    MONOCYTES 8.80 04/06/2022 09:13 AM    EOSINOPHILS 1.70 04/06/2022 09:13 AM    BASOPHILS 0.40 04/06/2022 09:13 AM        No results found for: HBA1C       Imaging:   I personally reviewed following images       MRI cervical spine 6/6/2019  Moderate right C3-4 neuroforaminal stenosis.  Moderate bilateral C5-6 neuroforaminal stenosis.  Facet arthropathy is present right worse than left C3-4, C4-5, C5-6.     X-ray bilateral knees 8/23/2019  Minimal bilateral osteoarthritis of the knees.    I reviewed the following radiology reports    MRI lumbar spine 4/6/2022  IMPRESSION:   1.  Multilevel degenerative disc disease and facet degeneration. This results in moderate central canal narrowing at L3-4 and L4-5 and borderline central canal narrowing at L2-3.   2.  Varying degrees of neural foraminal narrowing and attenuation of lateral recesses as specifically described above.   3.  Mild anterior degenerative subluxation at L3-4 and L4-5.                     Results for orders placed during the hospital encounter of 06/06/19    MR-CERVICAL SPINE-W/O    Impression  1.  Multilevel degenerative disc disease, uncinate and facet degeneration. There are varying degrees of neural foraminal narrowing at levels as specifically described above. No significant central canal narrowing.    2.  Loss of the normal cervical lordosis.                                        Results for orders  placed during the hospital encounter of 19    DX-CERVICAL SPINE-4+ VIEWS    Impression  1.  There is degenerative disease and arthropathy predominantly at the C5-6 and C6-7 levels.  2.  The alignment is normal. There is no abnormal motion.                     Results for orders placed during the hospital encounter of 19    DX-KNEE 3 VIEWS RIGHT    Impression  Minimal tricompartmental osteoarthrosis bilaterally. No erosions.   Results for orders placed during the hospital encounter of 19    DX-KNEES-AP BILATERAL STANDING    Impression  Minimal tricompartmental osteoarthrosis bilaterally. No erosions.                           DIAGNOSIS   Visit Diagnoses     ICD-10-CM   1. Lumbar radiculopathy  M54.16   2. Acute bilateral low back pain with left-sided sciatica  M54.42   3. Impaired mobility and ADLs  Z74.09    Z78.9   4. Primary osteoarthritis of both knees  M17.0   5. Spinal stenosis of lumbar region without neurogenic claudication  M48.061         ASSESSMENT and PLAN:     Audrey Aquino  1950 female      Audrey was seen today for follow-up.    Diagnoses and all orders for this visit:    Lumbar radiculopathy    Acute bilateral low back pain with left-sided sciatica    Impaired mobility and ADLs    Primary osteoarthritis of both knees    Spinal stenosis of lumbar region without neurogenic claudication        We discussed the MRI of the lumbar spine and I reviewed the results with the patient.  We discussed options including an epidural steroid injection however we decided to proceed conservatively.  The patient will continue with physical therapy.  We discussed emergency precautions.    She can use gabapentin 100 mg 3 times daily as needed for pain    Follow up: As needed with me after the above conservative treatments    Thank you for allowing me to participate in the care of this patient. If you have any questions please not hesitate to contact me.             Please note that this dictation  was created using voice recognition software. I have made every reasonable attempt to correct obvious errors but there may be errors of grammar and content that I may have overlooked prior to finalization of this note.      Ru Estrada MD  Interventional Spine and Sports Physiatry  Physical Medicine and Rehabilitation  [facility]

## 2022-04-08 RX ORDER — ESTRADIOL 0.03 MG/D
FILM, EXTENDED RELEASE TRANSDERMAL
Qty: 24 PATCH | Refills: 3 | Status: SHIPPED | OUTPATIENT
Start: 2022-04-08

## 2022-04-13 ENCOUNTER — APPOINTMENT (OUTPATIENT)
Dept: PHYSICAL MEDICINE AND REHAB | Facility: MEDICAL CENTER | Age: 72
End: 2022-04-13
Payer: MEDICARE

## 2022-04-26 SDOH — ECONOMIC STABILITY: INCOME INSECURITY: HOW HARD IS IT FOR YOU TO PAY FOR THE VERY BASICS LIKE FOOD, HOUSING, MEDICAL CARE, AND HEATING?: NOT HARD AT ALL

## 2022-04-26 SDOH — ECONOMIC STABILITY: FOOD INSECURITY: WITHIN THE PAST 12 MONTHS, THE FOOD YOU BOUGHT JUST DIDN'T LAST AND YOU DIDN'T HAVE MONEY TO GET MORE.: NEVER TRUE

## 2022-04-26 SDOH — ECONOMIC STABILITY: HOUSING INSECURITY

## 2022-04-26 SDOH — ECONOMIC STABILITY: FOOD INSECURITY: WITHIN THE PAST 12 MONTHS, YOU WORRIED THAT YOUR FOOD WOULD RUN OUT BEFORE YOU GOT MONEY TO BUY MORE.: NEVER TRUE

## 2022-04-26 SDOH — ECONOMIC STABILITY: INCOME INSECURITY: IN THE LAST 12 MONTHS, WAS THERE A TIME WHEN YOU WERE NOT ABLE TO PAY THE MORTGAGE OR RENT ON TIME?: NO

## 2022-04-26 SDOH — HEALTH STABILITY: PHYSICAL HEALTH: ON AVERAGE, HOW MANY MINUTES DO YOU ENGAGE IN EXERCISE AT THIS LEVEL?: 40 MIN

## 2022-04-26 SDOH — HEALTH STABILITY: MENTAL HEALTH
STRESS IS WHEN SOMEONE FEELS TENSE, NERVOUS, ANXIOUS, OR CAN'T SLEEP AT NIGHT BECAUSE THEIR MIND IS TROUBLED. HOW STRESSED ARE YOU?: NOT AT ALL

## 2022-04-26 SDOH — HEALTH STABILITY: PHYSICAL HEALTH: ON AVERAGE, HOW MANY DAYS PER WEEK DO YOU ENGAGE IN MODERATE TO STRENUOUS EXERCISE (LIKE A BRISK WALK)?: 7 DAYS

## 2022-04-26 SDOH — ECONOMIC STABILITY: TRANSPORTATION INSECURITY
IN THE PAST 12 MONTHS, HAS LACK OF TRANSPORTATION KEPT YOU FROM MEETINGS, WORK, OR FROM GETTING THINGS NEEDED FOR DAILY LIVING?: NO

## 2022-04-26 ASSESSMENT — SOCIAL DETERMINANTS OF HEALTH (SDOH)
IN A TYPICAL WEEK, HOW MANY TIMES DO YOU TALK ON THE PHONE WITH FAMILY, FRIENDS, OR NEIGHBORS?: MORE THAN THREE TIMES A WEEK
HOW OFTEN DO YOU ATTEND CHURCH OR RELIGIOUS SERVICES?: MORE THAN 4 TIMES PER YEAR
HOW OFTEN DO YOU ATTEND CHURCH OR RELIGIOUS SERVICES?: MORE THAN 4 TIMES PER YEAR
HOW HARD IS IT FOR YOU TO PAY FOR THE VERY BASICS LIKE FOOD, HOUSING, MEDICAL CARE, AND HEATING?: NOT HARD AT ALL
HOW MANY DRINKS CONTAINING ALCOHOL DO YOU HAVE ON A TYPICAL DAY WHEN YOU ARE DRINKING: 1 OR 2
HOW OFTEN DO YOU HAVE A DRINK CONTAINING ALCOHOL: 2-3 TIMES A WEEK
HOW OFTEN DO YOU ATTENT MEETINGS OF THE CLUB OR ORGANIZATION YOU BELONG TO?: MORE THAN 4 TIMES PER YEAR
HOW OFTEN DO YOU ATTENT MEETINGS OF THE CLUB OR ORGANIZATION YOU BELONG TO?: MORE THAN 4 TIMES PER YEAR
DO YOU BELONG TO ANY CLUBS OR ORGANIZATIONS SUCH AS CHURCH GROUPS UNIONS, FRATERNAL OR ATHLETIC GROUPS, OR SCHOOL GROUPS?: YES
DO YOU BELONG TO ANY CLUBS OR ORGANIZATIONS SUCH AS CHURCH GROUPS UNIONS, FRATERNAL OR ATHLETIC GROUPS, OR SCHOOL GROUPS?: YES
HOW OFTEN DO YOU GET TOGETHER WITH FRIENDS OR RELATIVES?: MORE THAN THREE TIMES A WEEK
WITHIN THE PAST 12 MONTHS, YOU WORRIED THAT YOUR FOOD WOULD RUN OUT BEFORE YOU GOT THE MONEY TO BUY MORE: NEVER TRUE
HOW OFTEN DO YOU HAVE SIX OR MORE DRINKS ON ONE OCCASION: NEVER
IN A TYPICAL WEEK, HOW MANY TIMES DO YOU TALK ON THE PHONE WITH FAMILY, FRIENDS, OR NEIGHBORS?: MORE THAN THREE TIMES A WEEK
HOW OFTEN DO YOU GET TOGETHER WITH FRIENDS OR RELATIVES?: MORE THAN THREE TIMES A WEEK

## 2022-04-26 ASSESSMENT — LIFESTYLE VARIABLES
HOW OFTEN DO YOU HAVE SIX OR MORE DRINKS ON ONE OCCASION: NEVER
HOW OFTEN DO YOU HAVE A DRINK CONTAINING ALCOHOL: 2-3 TIMES A WEEK
HOW MANY STANDARD DRINKS CONTAINING ALCOHOL DO YOU HAVE ON A TYPICAL DAY: 1 OR 2

## 2022-04-27 ENCOUNTER — PATIENT MESSAGE (OUTPATIENT)
Dept: MEDICAL GROUP | Facility: MEDICAL CENTER | Age: 72
End: 2022-04-27

## 2022-04-27 ENCOUNTER — OFFICE VISIT (OUTPATIENT)
Dept: MEDICAL GROUP | Facility: MEDICAL CENTER | Age: 72
End: 2022-04-27
Payer: MEDICARE

## 2022-04-27 ENCOUNTER — HOSPITAL ENCOUNTER (OUTPATIENT)
Dept: RADIOLOGY | Facility: MEDICAL CENTER | Age: 72
End: 2022-04-27
Attending: FAMILY MEDICINE
Payer: MEDICARE

## 2022-04-27 VITALS
SYSTOLIC BLOOD PRESSURE: 100 MMHG | HEART RATE: 57 BPM | BODY MASS INDEX: 27.79 KG/M2 | DIASTOLIC BLOOD PRESSURE: 60 MMHG | HEIGHT: 62 IN | RESPIRATION RATE: 16 BRPM | OXYGEN SATURATION: 99 % | TEMPERATURE: 97.5 F | WEIGHT: 151 LBS

## 2022-04-27 DIAGNOSIS — Z00.00 MEDICARE ANNUAL WELLNESS VISIT, SUBSEQUENT: ICD-10-CM

## 2022-04-27 DIAGNOSIS — E78.2 MIXED HYPERLIPIDEMIA: ICD-10-CM

## 2022-04-27 DIAGNOSIS — Z78.0 ASYMPTOMATIC MENOPAUSAL STATE: ICD-10-CM

## 2022-04-27 DIAGNOSIS — N95.2 VAGINAL ATROPHY: ICD-10-CM

## 2022-04-27 DIAGNOSIS — J45.991 COUGH VARIANT ASTHMA: ICD-10-CM

## 2022-04-27 DIAGNOSIS — L90.9: ICD-10-CM

## 2022-04-27 DIAGNOSIS — G47.33 OBSTRUCTIVE SLEEP APNEA SYNDROME: Chronic | ICD-10-CM

## 2022-04-27 PROBLEM — M22.2X2 BILATERAL PATELLOFEMORAL SYNDROME: Status: RESOLVED | Noted: 2018-10-16 | Resolved: 2022-04-27

## 2022-04-27 PROBLEM — M48.02 FORAMINAL STENOSIS OF CERVICAL REGION: Status: RESOLVED | Noted: 2019-06-03 | Resolved: 2022-04-27

## 2022-04-27 PROBLEM — F51.01 PRIMARY INSOMNIA: Status: RESOLVED | Noted: 2018-10-16 | Resolved: 2022-04-27

## 2022-04-27 PROBLEM — M22.2X1 BILATERAL PATELLOFEMORAL SYNDROME: Status: RESOLVED | Noted: 2018-10-16 | Resolved: 2022-04-27

## 2022-04-27 PROBLEM — K05.10 GINGIVITIS: Status: RESOLVED | Noted: 2019-09-18 | Resolved: 2022-04-27

## 2022-04-27 PROBLEM — G43.019 INTRACTABLE MIGRAINE WITHOUT AURA AND WITHOUT STATUS MIGRAINOSUS: Status: RESOLVED | Noted: 2018-10-16 | Resolved: 2022-04-27

## 2022-04-27 PROBLEM — M65.341 TRIGGER RING FINGER OF RIGHT HAND: Status: RESOLVED | Noted: 2018-12-13 | Resolved: 2022-04-27

## 2022-04-27 PROCEDURE — 77080 DXA BONE DENSITY AXIAL: CPT

## 2022-04-27 PROCEDURE — G0439 PPPS, SUBSEQ VISIT: HCPCS | Performed by: FAMILY MEDICINE

## 2022-04-27 RX ORDER — TRETINOIN 0.5 MG/G
CREAM TOPICAL
Qty: 45 G | Refills: 3 | Status: SHIPPED | OUTPATIENT
Start: 2022-04-27 | End: 2022-10-13

## 2022-04-27 ASSESSMENT — ACTIVITIES OF DAILY LIVING (ADL): BATHING_REQUIRES_ASSISTANCE: 0

## 2022-04-27 ASSESSMENT — FIBROSIS 4 INDEX: FIB4 SCORE: 1.08

## 2022-04-27 ASSESSMENT — PATIENT HEALTH QUESTIONNAIRE - PHQ9: CLINICAL INTERPRETATION OF PHQ2 SCORE: 0

## 2022-04-27 ASSESSMENT — ENCOUNTER SYMPTOMS: GENERAL WELL-BEING: GOOD

## 2022-04-27 NOTE — PROGRESS NOTES
Chief Complaint   Patient presents with   • Annual Exam       HPI:  Audrey Aquino is a 71 y.o. here for Medicare Annual Wellness Visit     Patient Active Problem List    Diagnosis Date Noted   • Hypertriglyceridemia 04/27/2020   • Gingivitis 09/18/2019   • Foraminal stenosis of cervical region 06/03/2019   • Trigger ring finger of right hand 12/13/2018   • Cough variant asthma 10/16/2018   • Intractable migraine without aura and without status migrainosus 10/16/2018   • Primary insomnia 10/16/2018   • Bilateral patellofemoral syndrome 10/16/2018   • Hx of total hysterectomy 10/16/2018   • Vaginal atrophy 10/16/2018   • Obstructive sleep apnea syndrome 09/12/2014   • History of radial keratotomy 12/08/2010   • Acquired absence of other genital organ(s) 12/22/2008   • Mixed hyperlipidemia 09/21/2007   • Diverticulosis of colon 10/20/2006   • Allergic rhinitis 01/15/2003       Current Outpatient Medications   Medication Sig Dispense Refill   • Estradiol 0.025 MG/24HR PATCH BIWEEKLY APPLY 1 PATCH TWICE WEEKLY AS DIRECTED 24 Patch 3   • gabapentin (NEURONTIN) 100 MG Cap Take 1 Capsule by mouth 3 times a day as needed (pain). 90 Capsule 5   • fenofibrate (TRICOR) 145 MG Tab TAKE 1 TABLET DAILY 90 Tablet 3   • budesonide-formoterol (SYMBICORT) 160-4.5 MCG/ACT Aerosol Inhale 2 Puffs 2 Times a Day. 1 Each 2   • estradiol (VAGIFEM) 10 MCG Tab insert 1 tablet vaginally once daily as directed 30 Tab 2   • Coenzyme Q10 (COQ10) 100 MG Cap Take  by mouth.     • Multiple Vitamins-Minerals (DRY EYE FORMULA) Cap Take  by mouth.     • CALCIUM-MAGNESIUM PO Take  by mouth.       No current facility-administered medications for this visit.          Current supplements as per medication list.     Allergies: Penicillins and Codeine    Current social contact/activities:      She  reports that she has never smoked. She has never used smokeless tobacco. She reports current alcohol use. She reports current drug use. Drug:  Marijuana.  Counseling given: Not Answered      DPA/Advanced Directive:  Patient has Advanced Directive on file.     ROS:    Gait: Uses no assistive device  Ostomy: No  Other tubes: No  Amputations: No  Chronic oxygen use: No  Last eye exam: 1/05/22  Wears hearing aids: No   : Denies any urinary leakage during the last 6 months    Screening:    Depression Screening  Little interest or pleasure in doing things?  0 - not at all  Feeling down, depressed , or hopeless? 0 - not at all  Patient Health Questionnaire Score: 0     If depressive symptoms identified deferred to follow up visit unless specifically addressed in assessment and plan.    Interpretation of PHQ-9 Total Score   Score Severity   1-4 No Depression   5-9 Mild Depression   10-14 Moderate Depression   15-19 Moderately Severe Depression   20-27 Severe Depression    Screening for Cognitive Impairment  Three Minute Recall (daughter, heaven, mountain) 3/3    Travis clock face with all 12 numbers and set the hands to show 10 past 11.  Yes    Cognitive concerns identified deferred for follow up unless specifically addressed in assessment and plan.    Fall Risk Assessment  Has the patient had two or more falls in the last year or any fall with injury in the last year?  No    Safety Assessment  Throw rugs on floor.  No  Handrails on all stairs.  No  Good lighting in all hallways.  Yes  Difficulty hearing.  No  Patient counseled about all safety risks that were identified.    Functional Assessment ADLs  Are there any barriers preventing you from cooking for yourself or meeting nutritional needs?  No.    Are there any barriers preventing you from driving safely or obtaining transportation?  No.    Are there any barriers preventing you from using a telephone or calling for help?  No.    Are there any barriers preventing you from shopping?  No.    Are there any barriers preventing you from taking care of your own finances?  No.    Are there any barriers preventing you  from managing your medications?  No.    Are there any barriers preventing you from showering, bathing or dressing yourself?  No.    Are you currently engaging in any exercise or physical activity?  Yes.     What is your perception of your health?  Good.      Health Maintenance Summary          Overdue - HEPATITIS C SCREENING (Once) Overdue - never done    No completion history exists for this topic.          Scheduled - BONE DENSITY (Every 5 Years) Scheduled for 4/27/2022 08/29/2016  Done          Overdue - Annual Wellness Visit (Every 366 Days) Overdue since 3/30/2022    03/29/2021  Visit Dx: Medicare annual wellness visit, initial    03/29/2021  Initial Annual Wellness Visit - Includes PPPS ()          Overdue - MAMMOGRAM (Yearly) Overdue since 4/12/2022 04/12/2021  MA-SCREENING MAMMO BILAT W/TOMOSYNTHESIS W/CAD    03/05/2019  MA-DIAGNOSTIC DIGITAL MAMMO-UNILAT LEFT    02/20/2019  MA-SCREENING MAMMO BILAT W/TOMOSYNTHESIS W/CAD    04/24/2017  Done          COLORECTAL CANCER SCREENING (COLONOSCOPY - Every 10 Years) Next due on 11/7/2028 11/07/2018  COLONOSCOPY (Reason not specified)          IMM DTaP/Tdap/Td Vaccine (3 - Td or Tdap) Next due on 5/17/2029 05/17/2019  Imm Admin: Tdap Vaccine    07/27/2010  Imm Admin: Tdap Vaccine          IMM PNEUMOCOCCAL VACCINE: 65+ Years (Series Information) Completed    10/24/2017  Imm Admin: Pneumococcal polysaccharide vaccine (PPSV-23)    08/03/2016  Imm Admin: Pneumococcal Conjugate Vaccine (Prevnar/PCV-13)          IMM MENINGOCOCCAL VACCINE (MCV4) (Series Information) Aged Out    05/17/2019  Imm Admin: Meningococcal Conjugate Vaccine MCV4 (Menactra)          IMM ZOSTER VACCINES (Series Information) Completed    11/19/2019  Imm Admin: Zoster Vaccine Recombinant (RZV) (SHINGRIX)    09/18/2019  Imm Admin: Zoster Vaccine Recombinant (RZV) (SHINGRIX)    08/02/2011  Imm Admin: Zoster Vaccine Live (ZVL) (Zostavax) - HISTORICAL DATA          COVID-19 Vaccine (Series  Information) Completed    09/17/2021  Imm Admin: Moderna SARS-CoV-2 Vaccine    03/12/2021  Imm Admin: Moderna SARS-CoV-2 Vaccine    02/12/2021  Imm Admin: Moderna SARS-CoV-2 Vaccine          IMM INFLUENZA (Series Information) Completed    10/04/2021  Imm Admin: Influenza Vaccine Adult HD    10/07/2020  Imm Admin: Influenza Vaccine Quad Inj (Pf)    10/18/2019  Imm Admin: Influenza Vac Subunit Quad Inj (Pf)    10/16/2018  Imm Admin: Influenza Vaccine Adult HD    10/24/2017  Imm Admin: Influenza Vaccine Adult HD    Only the first 5 history entries have been loaded, but more history exists.          IMM HEP B VACCINE (Series Information) Aged Out    No completion history exists for this topic.          Discontinued - PAP SMEAR  Discontinued    No completion history exists for this topic.                Patient Care Team:  Airam Ardon M.D. as PCP - General (Geriatrics)  [facility]  as Respiratory Therapist        Social History     Tobacco Use   • Smoking status: Never Smoker   • Smokeless tobacco: Never Used   Vaping Use   • Vaping Use: Never used   Substance Use Topics   • Alcohol use: Yes     Comment: 2-3 drinks a week    • Drug use: Yes     Types: Marijuana     Comment: indica tablets for sleep     Family History   Problem Relation Age of Onset   • Cancer Mother         breast     She  has a past medical history of Asthma, Dry eyes, Frequent headaches, Gingivitis, History of hormone therapy, Hyperlipidemia, Knee pain, Overweight, Shoulder injury, and Sleep disorder.   Past Surgical History:   Procedure Laterality Date   • EYE SURGERY      right cataract remobal/lens placement       Exam:   There were no vitals taken for this visit. There is no height or weight on file to calculate BMI.    Hearing excellent.    Dentition good  Alert, oriented in no acute distress.  Eye contact is good, speech goal directed, affect calm    Assessment and Plan. The following treatment and monitoring plan is recommended:    71  y.o. female     1. Medicare annual wellness visit, subsequent  Preventive measures and chronic medical issues reviewed.    - Subsequent Annual Wellness Visit - Includes PPPS ()    2. Cough variant asthma  Stable.  Currently asymptomatic.  Uses Symbicort as needed    - Subsequent Annual Wellness Visit - Includes PPPS ()    3. Obstructive sleep apnea syndrome  Has been doing fine on CPAP.    - Subsequent Annual Wellness Visit - Includes PPPS ()    4. Mixed hyperlipidemia  Mainly hypertriglyceridemia.  She has been responding very well to fenofibrate.  No side effects.  - Subsequent Annual Wellness Visit - Includes PPPS ()    5. Vaginal atrophy  Estradiol patch has been helping.    - Subsequent Annual Wellness Visit - Includes PPPS ()    6. Spotting, atrophic skin  Has been using Retin-A cream on her face and has always been helping.    - tretinoin (RETIN-A) 0.05 % cream; Use once nightly  Dispense: 45 g; Refill: 3        Services suggested: No services needed at this time  Health Care Screening: Age-appropriate preventive services recommended by USPTF and ACIP covered by Medicare were discussed today. Services ordered if indicated and agreed upon by the patient.  Referrals offered: Community-based lifestyle interventions to reduce health risks and promote self-management and wellness, fall prevention, nutrition, physical activity, tobacco-use cessation, weight loss, and mental health services as per orders if indicated.    Discussion today about general wellness and lifestyle habits:    · Prevent falls and reduce trip hazards; Cautioned about securing or removing rugs.  · Have a working fire alarm and carbon monoxide detector;   · Engage in regular physical activity and social activities     Follow-up: No follow-ups on file.

## 2022-05-02 ENCOUNTER — TELEPHONE (OUTPATIENT)
Dept: MEDICAL GROUP | Facility: MEDICAL CENTER | Age: 72
End: 2022-05-02
Payer: MEDICARE

## 2022-05-02 NOTE — TELEPHONE ENCOUNTER
DOCUMENTATION OF PAR STATUS:    1. Name of Medication & Dose: tretinoin (RETIN-A) 0.05 % cream      2. Name of Prescription Coverage Company & phone #: Medicare    3. Date Prior Auth Submitted: 5/2/22    4. What information was given to obtain insurance decision? Provider, Insurance and pt information    5. Prior Auth Status? Pending    6. Patient Notified: N\A

## 2022-05-11 NOTE — TELEPHONE ENCOUNTER
FINAL PRIOR AUTHORIZATION STATUS:    1.  Name of Medication & Dose: tretinoin (RETIN-A) 0.05 % cream          2. Prior Auth Status: Denied.  Reason: pt doesn't qualify by insurance    3. Action Taken: Pharmacy Notified: yes Patient Notified: yes

## 2022-08-04 ENCOUNTER — TELEMEDICINE (OUTPATIENT)
Dept: MEDICAL GROUP | Facility: MEDICAL CENTER | Age: 72
End: 2022-08-04
Payer: MEDICARE

## 2022-08-04 DIAGNOSIS — J45.991 COUGH VARIANT ASTHMA: ICD-10-CM

## 2022-08-04 DIAGNOSIS — G47.00 INSOMNIA, UNSPECIFIED TYPE: ICD-10-CM

## 2022-08-04 DIAGNOSIS — E78.1 HYPERTRIGLYCERIDEMIA: ICD-10-CM

## 2022-08-04 PROCEDURE — 99214 OFFICE O/P EST MOD 30 MIN: CPT | Mod: 95 | Performed by: FAMILY MEDICINE

## 2022-08-04 RX ORDER — ZOLPIDEM TARTRATE 5 MG/1
5 TABLET ORAL NIGHTLY PRN
Qty: 30 TABLET | Refills: 0 | Status: SHIPPED | OUTPATIENT
Start: 2022-08-04 | End: 2022-09-03

## 2022-10-13 ENCOUNTER — OFFICE VISIT (OUTPATIENT)
Dept: SLEEP MEDICINE | Facility: MEDICAL CENTER | Age: 72
End: 2022-10-13
Payer: MEDICARE

## 2022-10-13 VITALS
OXYGEN SATURATION: 96 % | DIASTOLIC BLOOD PRESSURE: 62 MMHG | RESPIRATION RATE: 14 BRPM | HEIGHT: 62 IN | BODY MASS INDEX: 26.13 KG/M2 | HEART RATE: 60 BPM | WEIGHT: 142 LBS | SYSTOLIC BLOOD PRESSURE: 106 MMHG

## 2022-10-13 DIAGNOSIS — G47.33 OBSTRUCTIVE SLEEP APNEA SYNDROME: ICD-10-CM

## 2022-10-13 DIAGNOSIS — G47.33 OSA (OBSTRUCTIVE SLEEP APNEA): Primary | ICD-10-CM

## 2022-10-13 PROCEDURE — 99213 OFFICE O/P EST LOW 20 MIN: CPT | Performed by: STUDENT IN AN ORGANIZED HEALTH CARE EDUCATION/TRAINING PROGRAM

## 2022-10-13 ASSESSMENT — FIBROSIS 4 INDEX: FIB4 SCORE: 1.09

## 2022-10-13 NOTE — PROGRESS NOTES
[facility] Follow-up Visit    CC: Yearly follow-up for management of obstructive sleep apnea      HPI:  Audrey Aquino is a 72 y.o.female  with hyperlipidemia, migraines, and obstructive sleep apnea on CPAP.  Presents today to follow-up regarding management of obstructive sleep apnea.    She continues to use her CPAP regularly.  With camping and hiking she will switch to her oral appliance.  She has been finding recently over the last few months that she is waking up in the night.  This awakenings very brief but she does find it hard to get back to sleep with the CPAP machine.  Therefore she is changing to her oral appliance midway through the night to control her sleep apnea.  She is unsure what is awakening her.  She does not feel it is her pressures.  She feels the mask is fitting well.  She uses a nasal cushion and does feel if she lays on her side seal be broken.  This can wake her up at times.    She continues to find that her oral appliance is effective when she is not using her CPAP machine.  She did undergo a home sleep study at beginning of this year with oral appliance in place which showed its continuing to be effective.    DME provider: [facility]   Device: Airsense 10  Mask: resmed N 30 I  Aerophagia: No   Snoring: No   Dry mouth: No   Leak: No   Skin irritation: No   Chin strap: No     Patient Active Problem List    Diagnosis Date Noted    Hypertriglyceridemia 04/27/2020    Cough variant asthma 10/16/2018    Hx of total hysterectomy 10/16/2018    Vaginal atrophy 10/16/2018    Obstructive sleep apnea syndrome 09/12/2014    History of radial keratotomy 12/08/2010    Acquired absence of other genital organ(s) 12/22/2008    Mixed hyperlipidemia 09/21/2007    Diverticulosis of colon 10/20/2006    Allergic rhinitis 01/15/2003       Past Medical History:   Diagnosis Date    Asthma     cough equivalent    Dry eyes     Frequent headaches     Gingivitis     History of hormone therapy     Hyperlipidemia      Knee pain     Overweight     Shoulder injury     Sleep disorder         Past Surgical History:   Procedure Laterality Date    EYE SURGERY      right cataract remobal/lens placement       Family History   Problem Relation Age of Onset    Cancer Mother         breast       Social History     Socioeconomic History    Marital status:      Spouse name: Not on file    Number of children: Not on file    Years of education: Not on file    Highest education level: Professional school degree (e.g., MD, CLARK, DVM, FRANK)   Occupational History    Not on file   Tobacco Use    Smoking status: Never    Smokeless tobacco: Never   Vaping Use    Vaping Use: Never used   Substance and Sexual Activity    Alcohol use: Yes     Comment: 2-3 drinks a week     Drug use: Yes     Types: Marijuana     Comment: indica tablets for sleep    Sexual activity: Not Currently   Other Topics Concern     Service No    Blood Transfusions No    Caffeine Concern No    Occupational Exposure No    Hobby Hazards Yes    Sleep Concern Yes    Stress Concern No    Weight Concern Yes    Special Diet No    Back Care No    Exercise Yes    Bike Helmet No    Seat Belt Yes    Self-Exams Yes   Social History Narrative    Not on file     Social Determinants of Health     Financial Resource Strain: Low Risk     Difficulty of Paying Living Expenses: Not hard at all   Food Insecurity: No Food Insecurity    Worried About Running Out of Food in the Last Year: Never true    Ran Out of Food in the Last Year: Never true   Transportation Needs: No Transportation Needs    Lack of Transportation (Medical): No    Lack of Transportation (Non-Medical): No   Physical Activity: Sufficiently Active    Days of Exercise per Week: 7 days    Minutes of Exercise per Session: 40 min   Stress: No Stress Concern Present    Feeling of Stress : Not at all   Social Connections: Moderately Integrated    Frequency of Communication with Friends and Family: More than three times a week  "   Frequency of Social Gatherings with Friends and Family: More than three times a week    Attends [employer] Services: More than 4 times per year    Active Member of Clubs or Organizations: Yes    Attends Club or Organization Meetings: More than 4 times per year    Marital Status:    Intimate Partner Violence: Not on file   Housing Stability: Unknown    Unable to Pay for Housing in the Last Year: No    Number of Places Lived in the Last Year: Not on file    Unstable Housing in the Last Year: No       Current Outpatient Medications   Medication Sig Dispense Refill    Estradiol 0.025 MG/24HR PATCH BIWEEKLY APPLY 1 PATCH TWICE WEEKLY AS DIRECTED 24 Patch 3    fenofibrate (TRICOR) 145 MG Tab TAKE 1 TABLET DAILY 90 Tablet 3    budesonide-formoterol (SYMBICORT) 160-4.5 MCG/ACT Aerosol Inhale 2 Puffs 2 Times a Day. 1 Each 2    estradiol (VAGIFEM) 10 MCG Tab insert 1 tablet vaginally once daily as directed 30 Tab 2    Coenzyme Q10 (COQ10) 100 MG Cap Take  by mouth.      Multiple Vitamins-Minerals (DRY EYE FORMULA) Cap Take  by mouth.      CALCIUM-MAGNESIUM PO Take  by mouth.      tretinoin (RETIN-A) 0.05 % cream Use once nightly 45 g 3     No current facility-administered medications for this visit.        ALLERGIES: Penicillins and Codeine    ROS  Constitutional: Denies fevers, Denies weight changes  Ears/Nose/Throat/Mouth: Denies nasal congestion or sore throat   Cardiovascular: Denies chest pain  Respiratory: Denies shortness of breath, Denies cough  Gastrointestinal/Hepatic: Denies nausea, vomiting  Sleep: see HPI      PHYSICAL EXAM  /62 (BP Location: Left arm, Patient Position: Sitting, BP Cuff Size: Large adult)   Pulse 60   Resp 14   Ht 1.575 m (5' 2\")   Wt 64.4 kg (142 lb)   SpO2 96%   BMI 25.97 kg/m²   Appearance: Well-nourished, well-developed, no acute distress  Eyes:  No scleral icterus , EOMI  ENMT: masked  Musculoskeletal:  Grossly normal; gait and station normal; digits and nails " normal  Skin:  No rashes, petechiae, cyanosis  Neurologic: without focal signs; oriented to person, time, place, and purpose; judgement intact      Medical Decision Making   Assessment and Plan  Audrey Aquino is a 72 y.o.female  with hyperlipidemia, migraines, and obstructive sleep apnea on CPAP.  Presents today to follow-up regarding management of obstructive sleep apnea.      Obstructive sleep apnea  Compliance data reviewed showing 63% usage > 4hours in last 30  days. Average AHI 0.3 events/hour. 90-95% pressure 14.1 CWP. Pt continues to use and benefit from machine.   Current settings are auto CPAP 5 to 15 cm water.  On days when patient does not use her CPAP machine she does use her oral appliance.    She reports being interested in a travel CPAP machine.  She would like a order to purchase a travel CPAP machine.    Discussed that given her excellent control of her sleep apnea with her CPAP machine she may consider decreasing the pressure to see if this helps with leak and awakening at night.  We will lower maximum pressure from 15-13.      PLAN:   -Order placed for mask and supplies   -Order placed for travel CPAP machine  -Settings changed on CPAP machine to 5-13 cmH2O  -Advised to reach out via MyChart with questions     Has been advised to continue the current CPAP, clean equipment frequently, and get new mask and supplies as allowed by insurance and DME. Recommend an earlier appointment, if significant treatment barriers develop.    Patients with GÓMEZ are at increased risk of cardiovascular disease including coronary artery disease, systemic arterial hypertension, pulmonary arterial hypertension, cardiac arrythmias, and stroke. The patient was advised to avoid driving a motor vehicle when drowsy.    Positive airway pressure will favorably impact many of the adverse conditions and effects provoked by GÓMEZ.    Have advised the patient to follow up with the appropriate healthcare practitioners for all other  medical problems and issues.    Return in about 1 year (around 10/13/2023).      Please note portions of this record was created using voice recognition software. I have made every reasonable attempt to correct obvious errors, but I expect that there are errors of grammar and possibly content I did not discover before finalizing the note.

## 2022-10-30 DIAGNOSIS — E78.1 HYPERTRIGLYCERIDEMIA: ICD-10-CM

## 2022-10-31 RX ORDER — FENOFIBRATE 145 MG/1
TABLET, COATED ORAL
Qty: 90 TABLET | Refills: 3 | Status: SHIPPED | OUTPATIENT
Start: 2022-10-31

## 2022-11-07 ENCOUNTER — PATIENT MESSAGE (OUTPATIENT)
Dept: HEALTH INFORMATION MANAGEMENT | Facility: OTHER | Age: 72
End: 2022-11-07

## 2023-01-19 ENCOUNTER — HOSPITAL ENCOUNTER (OUTPATIENT)
Dept: RADIOLOGY | Facility: MEDICAL CENTER | Age: 73
End: 2023-01-19
Attending: FAMILY MEDICINE
Payer: MEDICARE

## 2023-01-19 DIAGNOSIS — Z12.31 VISIT FOR SCREENING MAMMOGRAM: ICD-10-CM

## 2023-01-19 PROCEDURE — 77063 BREAST TOMOSYNTHESIS BI: CPT

## 2023-06-19 ENCOUNTER — TELEPHONE (OUTPATIENT)
Dept: HEALTH INFORMATION MANAGEMENT | Facility: OTHER | Age: 73
End: 2023-06-19

## 2023-08-21 ENCOUNTER — APPOINTMENT (OUTPATIENT)
Dept: INTERNAL MEDICINE | Facility: IMAGING CENTER | Age: 73
End: 2023-08-21
Payer: MEDICARE

## 2023-08-22 ENCOUNTER — APPOINTMENT (OUTPATIENT)
Dept: INTERNAL MEDICINE | Facility: IMAGING CENTER | Age: 73
End: 2023-08-22
Payer: MEDICARE

## 2023-08-25 ENCOUNTER — HOME STUDY (OUTPATIENT)
Dept: SLEEP MEDICINE | Facility: MEDICAL CENTER | Age: 73
End: 2023-08-25
Attending: STUDENT IN AN ORGANIZED HEALTH CARE EDUCATION/TRAINING PROGRAM
Payer: MEDICARE

## 2023-08-25 DIAGNOSIS — G47.34 NOCTURNAL HYPOXIA: ICD-10-CM

## 2023-08-25 DIAGNOSIS — G47.33 OSA (OBSTRUCTIVE SLEEP APNEA): ICD-10-CM

## 2023-08-25 PROCEDURE — 95800 SLP STDY UNATTENDED: CPT | Performed by: STUDENT IN AN ORGANIZED HEALTH CARE EDUCATION/TRAINING PROGRAM

## 2023-09-05 NOTE — PROCEDURES
DIAGNOSTIC HOME SLEEP TEST (HST) REPORT WatchPAT      PATIENT ID:  NAME:  Audrey Aquino  MRN:               [id number]  YOB: 1950  DATE OF STUDY: 8/25/2023      Impression:     This study shows evidence of:      1. Mild obstructive sleep apnea with 4% PAT apnea hypopnea index(pAHI) of 7.1 per hour.  These findings are based on 7 channels recording of PAT signal with sleep staging, heart rate, pulse oximetry, actigraphy, body position, snoring and respiratory movement.     2. Oxygenation O2 Sat. mean O2 sat was 92%,  da was 81% (likely artifact),  and maximum O2 at 97 %. O2 sat was at or  below 88% for 0.3 min of evaluation time. Oxygen Desaturation (>=4%) Index was 5.6/hr. AVG HR was 56 BPM.      TECHNICAL DESCRIPTION: Patient underwent home sleep apnea testing with peripheral arterial tone signal (WatchPAT™). This is a Type IV portable monitor and device per Medicare. Monitoring was done with 7 channels recording of PAT signal with sleep staging, heart rate, pulse oximetry, actigraphy, body position, snoring and respiratory movement. Prior to using the device, the patient received verbal and written instructions for its application and was provided with the help desk phone number for additional telephonic instruction with 24-hour availability of qualified personnel to answer questions.    Respiratory events:    pRDI: Total 130 (REM index 31.1/hr. NREM index 13.7/hr, All Night 19.5/hr)    3% pAHIc: Total 5 ( All Night 0.8/hr)    4% pAHI: Total 47 (All Night 7.1/hr)    General sleep summary: . Total recording time is 7 hours and 17 minutes and total Sleep time is 6 hours and 46 minutes. The patient spent 162.8 minutes in the supine position and 244 minutes in the nonsupine position.      Recommendations:    1.  Study completed with new oral appliance.  With oral appliance therapy mild obstructive sleep apnea was seen.  No significant nocturnal hypoxia was seen while using oral appliance  therapy.  2.   In general patients with sleep apnea are advised to avoid alcohol and sedatives and to not operate a motor vehicle while drowsy. Clinical correlation is required.         Wilson Bee MD

## 2023-10-04 ENCOUNTER — HOSPITAL ENCOUNTER (OUTPATIENT)
Dept: LAB | Facility: MEDICAL CENTER | Age: 73
End: 2023-10-04
Attending: FAMILY MEDICINE
Payer: MEDICARE

## 2023-10-04 LAB
25(OH)D3 SERPL-MCNC: 47 NG/ML (ref 30–100)
ALBUMIN SERPL BCP-MCNC: 4.6 G/DL (ref 3.2–4.9)
ALBUMIN/GLOB SERPL: 2 G/DL
ALP SERPL-CCNC: 90 U/L (ref 30–99)
ALT SERPL-CCNC: 30 U/L (ref 2–50)
ANION GAP SERPL CALC-SCNC: 12 MMOL/L (ref 7–16)
AST SERPL-CCNC: 34 U/L (ref 12–45)
BASOPHILS # BLD AUTO: 0.4 % (ref 0–1.8)
BASOPHILS # BLD: 0.03 K/UL (ref 0–0.12)
BILIRUB SERPL-MCNC: 0.4 MG/DL (ref 0.1–1.5)
BUN SERPL-MCNC: 19 MG/DL (ref 8–22)
CALCIUM ALBUM COR SERPL-MCNC: 9.4 MG/DL (ref 8.5–10.5)
CALCIUM SERPL-MCNC: 9.9 MG/DL (ref 8.5–10.5)
CHLORIDE SERPL-SCNC: 105 MMOL/L (ref 96–112)
CHOLEST SERPL-MCNC: 178 MG/DL (ref 100–199)
CO2 SERPL-SCNC: 25 MMOL/L (ref 20–33)
CREAT SERPL-MCNC: 0.8 MG/DL (ref 0.5–1.4)
CREAT UR-MCNC: 15.22 MG/DL
EOSINOPHIL # BLD AUTO: 0.14 K/UL (ref 0–0.51)
EOSINOPHIL NFR BLD: 2 % (ref 0–6.9)
ERYTHROCYTE [DISTWIDTH] IN BLOOD BY AUTOMATED COUNT: 42.5 FL (ref 35.9–50)
EST. AVERAGE GLUCOSE BLD GHB EST-MCNC: 111 MG/DL
ESTRADIOL SERPL-MCNC: 24.3 PG/ML
FASTING STATUS PATIENT QL REPORTED: NORMAL
GFR SERPLBLD CREATININE-BSD FMLA CKD-EPI: 78 ML/MIN/1.73 M 2
GLOBULIN SER CALC-MCNC: 2.3 G/DL (ref 1.9–3.5)
GLUCOSE SERPL-MCNC: 79 MG/DL (ref 65–99)
HBA1C MFR BLD: 5.5 % (ref 4–5.6)
HCT VFR BLD AUTO: 44.9 % (ref 37–47)
HDLC SERPL-MCNC: 48 MG/DL
HGB BLD-MCNC: 14.6 G/DL (ref 12–16)
IMM GRANULOCYTES # BLD AUTO: 0.03 K/UL (ref 0–0.11)
IMM GRANULOCYTES NFR BLD AUTO: 0.4 % (ref 0–0.9)
LDLC SERPL CALC-MCNC: 91 MG/DL
LYMPHOCYTES # BLD AUTO: 2.15 K/UL (ref 1–4.8)
LYMPHOCYTES NFR BLD: 31.4 % (ref 22–41)
MCH RBC QN AUTO: 28.4 PG (ref 27–33)
MCHC RBC AUTO-ENTMCNC: 32.5 G/DL (ref 32.2–35.5)
MCV RBC AUTO: 87.4 FL (ref 81.4–97.8)
MICROALBUMIN UR-MCNC: <1.2 MG/DL
MICROALBUMIN/CREAT UR: NORMAL MG/G (ref 0–30)
MONOCYTES # BLD AUTO: 0.46 K/UL (ref 0–0.85)
MONOCYTES NFR BLD AUTO: 6.7 % (ref 0–13.4)
NEUTROPHILS # BLD AUTO: 4.04 K/UL (ref 1.82–7.42)
NEUTROPHILS NFR BLD: 59.1 % (ref 44–72)
NRBC # BLD AUTO: 0 K/UL
NRBC BLD-RTO: 0 /100 WBC (ref 0–0.2)
PLATELET # BLD AUTO: 402 K/UL (ref 164–446)
PMV BLD AUTO: 9.8 FL (ref 9–12.9)
POTASSIUM SERPL-SCNC: 4.3 MMOL/L (ref 3.6–5.5)
PROT SERPL-MCNC: 6.9 G/DL (ref 6–8.2)
RBC # BLD AUTO: 5.14 M/UL (ref 4.2–5.4)
SODIUM SERPL-SCNC: 142 MMOL/L (ref 135–145)
T4 FREE SERPL-MCNC: 1.16 NG/DL (ref 0.93–1.7)
TRIGL SERPL-MCNC: 194 MG/DL (ref 0–149)
TSH SERPL DL<=0.005 MIU/L-ACNC: 2.03 UIU/ML (ref 0.38–5.33)
URATE SERPL-MCNC: 4.6 MG/DL (ref 1.9–8.2)
WBC # BLD AUTO: 6.9 K/UL (ref 4.8–10.8)

## 2023-10-04 PROCEDURE — 80061 LIPID PANEL: CPT

## 2023-10-04 PROCEDURE — 82570 ASSAY OF URINE CREATININE: CPT

## 2023-10-04 PROCEDURE — 80053 COMPREHEN METABOLIC PANEL: CPT

## 2023-10-04 PROCEDURE — 36415 COLL VENOUS BLD VENIPUNCTURE: CPT

## 2023-10-04 PROCEDURE — 84550 ASSAY OF BLOOD/URIC ACID: CPT

## 2023-10-04 PROCEDURE — 84439 ASSAY OF FREE THYROXINE: CPT

## 2023-10-04 PROCEDURE — 82043 UR ALBUMIN QUANTITATIVE: CPT

## 2023-10-04 PROCEDURE — 84443 ASSAY THYROID STIM HORMONE: CPT

## 2023-10-04 PROCEDURE — 82306 VITAMIN D 25 HYDROXY: CPT

## 2023-10-04 PROCEDURE — 82670 ASSAY OF TOTAL ESTRADIOL: CPT

## 2023-10-04 PROCEDURE — 83036 HEMOGLOBIN GLYCOSYLATED A1C: CPT | Mod: GA

## 2023-10-04 PROCEDURE — 85025 COMPLETE CBC W/AUTO DIFF WBC: CPT

## 2023-10-11 ENCOUNTER — OFFICE VISIT (OUTPATIENT)
Dept: SLEEP MEDICINE | Facility: MEDICAL CENTER | Age: 73
End: 2023-10-11
Attending: STUDENT IN AN ORGANIZED HEALTH CARE EDUCATION/TRAINING PROGRAM
Payer: MEDICARE

## 2023-10-11 VITALS
HEART RATE: 65 BPM | WEIGHT: 155 LBS | RESPIRATION RATE: 14 BRPM | SYSTOLIC BLOOD PRESSURE: 102 MMHG | HEIGHT: 62 IN | DIASTOLIC BLOOD PRESSURE: 60 MMHG | OXYGEN SATURATION: 96 % | BODY MASS INDEX: 28.52 KG/M2

## 2023-10-11 DIAGNOSIS — G47.33 OSA (OBSTRUCTIVE SLEEP APNEA): Primary | ICD-10-CM

## 2023-10-11 PROCEDURE — 3078F DIAST BP <80 MM HG: CPT | Performed by: STUDENT IN AN ORGANIZED HEALTH CARE EDUCATION/TRAINING PROGRAM

## 2023-10-11 PROCEDURE — 99212 OFFICE O/P EST SF 10 MIN: CPT | Performed by: STUDENT IN AN ORGANIZED HEALTH CARE EDUCATION/TRAINING PROGRAM

## 2023-10-11 PROCEDURE — 3074F SYST BP LT 130 MM HG: CPT | Performed by: STUDENT IN AN ORGANIZED HEALTH CARE EDUCATION/TRAINING PROGRAM

## 2023-10-11 PROCEDURE — 99213 OFFICE O/P EST LOW 20 MIN: CPT | Performed by: STUDENT IN AN ORGANIZED HEALTH CARE EDUCATION/TRAINING PROGRAM

## 2023-10-11 ASSESSMENT — FIBROSIS 4 INDEX: FIB4 SCORE: 1.11

## 2023-10-11 NOTE — PROGRESS NOTES
[facility] Follow-up Visit    CC: Presents today to follow-up regarding management of obstructive sleep apnea      HPI:  Audrey Aquino is a 73 y.o.female  with migraines, hyperlipidemia and obstructive sleep apnea on CPAP who presents today to follow-up regarding management of obstructive sleep apnea.    She continues to use her CPAP machine regularly.  Overall finds nasal pillows comfortable.  She finds the pressure comfortable as well.  Currently has no acute complaints regarding her CPAP machine.    She does follow with Dr Hamilton who is managing her oral appliance.  She uses oral appliance with traveling.  She underwent a home sleep study on 10/4/2023 through his office.  Study indicated a 4% AHI of 2.1.  The study was done on her previous Rinku device.  She has a new oral appliance made and underwent a home sleep study in August through our office which showed a 4% AHI of 7.1.  She is continue to follow with dentistry regarding managing her oral appliance.    DME provider: [facility]   Device: Airsense 10   Mask: nasal pillow   Aerophagia: No   Snoring: No   Dry mouth: Yes  Leak: No   Skin irritation: No   Chin strap: No       Patient Active Problem List    Diagnosis Date Noted    Hypertriglyceridemia 04/27/2020    Cough variant asthma 10/16/2018    Hx of total hysterectomy 10/16/2018    Vaginal atrophy 10/16/2018    Obstructive sleep apnea syndrome 09/12/2014    History of radial keratotomy 12/08/2010    Acquired absence of other genital organ(s) 12/22/2008    Mixed hyperlipidemia 09/21/2007    Diverticulosis of colon 10/20/2006    Allergic rhinitis 01/15/2003       Past Medical History:   Diagnosis Date    Asthma     cough equivalent    Dry eyes     Frequent headaches     Gingivitis     History of hormone therapy     Hyperlipidemia     Knee pain     Overweight     Shoulder injury     Sleep disorder         Past Surgical History:   Procedure Laterality Date    EYE SURGERY      right cataract  remobal/lens placement       Family History   Problem Relation Age of Onset    Cancer Mother         breast       Social History     Socioeconomic History    Marital status:      Spouse name: Not on file    Number of children: Not on file    Years of education: Not on file    Highest education level: Professional school degree (e.g., MD, DDS, DVM, FRANK)   Occupational History    Not on file   Tobacco Use    Smoking status: Never    Smokeless tobacco: Never   Vaping Use    Vaping Use: Never used   Substance and Sexual Activity    Alcohol use: Yes     Comment: 2-3 drinks a week     Drug use: Yes     Types: Marijuana     Comment: indica tablets for sleep    Sexual activity: Not Currently   Other Topics Concern     Service No    Blood Transfusions No    Caffeine Concern No    Occupational Exposure No    Hobby Hazards Yes    Sleep Concern Yes    Stress Concern No    Weight Concern Yes    Special Diet No    Back Care No    Exercise Yes    Bike Helmet No    Seat Belt Yes    Self-Exams Yes   Social History Narrative    Not on file     Social Determinants of Health     Financial Resource Strain: Low Risk  (4/26/2022)    Overall Financial Resource Strain (CARDIA)     Difficulty of Paying Living Expenses: Not hard at all   Food Insecurity: No Food Insecurity (4/26/2022)    Hunger Vital Sign     Worried About Running Out of Food in the Last Year: Never true     Ran Out of Food in the Last Year: Never true   Transportation Needs: No Transportation Needs (4/26/2022)    PRAPARE - Transportation     Lack of Transportation (Medical): No     Lack of Transportation (Non-Medical): No   Physical Activity: Sufficiently Active (4/26/2022)    Exercise Vital Sign     Days of Exercise per Week: 7 days     Minutes of Exercise per Session: 40 min   Stress: No Stress Concern Present (4/26/2022)    [employer] [place] [employer] - Occupational Stress Questionnaire     Feeling of Stress : Not at all   Social Connections:  "Moderately Integrated (4/26/2022)    Social Connection and Isolation Panel [NHANES]     Frequency of Communication with Friends and Family: More than three times a week     Frequency of Social Gatherings with Friends and Family: More than three times a week     Attends Pent[employer]alism Services: More than 4 times per year     Active Member of Clubs or Organizations: Yes     Attends Club or Organization Meetings: More than 4 times per year     Marital Status:    Intimate Partner Violence: Not on file   Housing Stability: Unknown (4/26/2022)    Housing Stability Vital Sign     Unable to Pay for Housing in the Last Year: No     Number of Places Lived in the Last Year: Not on file     Unstable Housing in the Last Year: No       Current Outpatient Medications   Medication Sig Dispense Refill    fenofibrate (TRICOR) 145 MG Tab TAKE 1 TABLET DAILY 90 Tablet 3    Estradiol 0.025 MG/24HR PATCH BIWEEKLY APPLY 1 PATCH TWICE WEEKLY AS DIRECTED 24 Patch 3    budesonide-formoterol (SYMBICORT) 160-4.5 MCG/ACT Aerosol Inhale 2 Puffs 2 Times a Day. 1 Each 2    Coenzyme Q10 (COQ10) 100 MG Cap Take  by mouth.      Multiple Vitamins-Minerals (DRY EYE FORMULA) Cap Take  by mouth.      CALCIUM-MAGNESIUM PO Take  by mouth.       No current facility-administered medications for this visit.        ALLERGIES: Penicillins and Codeine    ROS  Constitutional: Denies fevers, Denies weight changes  Ears/Nose/Throat/Mouth: Denies nasal congestion or sore throat   Cardiovascular: Denies chest pain  Respiratory: Denies shortness of breath, Denies cough  Gastrointestinal/Hepatic: Denies nausea, vomiting  Sleep: see HPI      PHYSICAL EXAM  /60 (BP Location: Left arm, Patient Position: Sitting, BP Cuff Size: Large adult)   Pulse 65   Resp 14   Ht 1.575 m (5' 2\")   Wt 70.3 kg (155 lb)   SpO2 96%   BMI 28.35 kg/m²   Appearance: Well-nourished, well-developed, no acute distress  Eyes:  No scleral icterus , EOMI  Musculoskeletal:  Grossly " normal; gait and station normal; digits and nails normal  Skin:  No rashes, petechiae, cyanosis  Neurologic: without focal signs; oriented to person, time, place, and purpose; judgement intact      Medical Decision Making   Assessment and Plan  Audrey Aquino is a 73 y.o.female  with migraines, hyperlipidemia and obstructive sleep apnea on CPAP who presents today to follow-up regarding management of obstructive sleep apnea.    The medical record was reviewed.    Obstructive sleep apnea  Diagnostic and titration nocturnal polysomnogram's, home sleep apnea tests, continuous nocturnal oximetry results, multiple sleep latency tests, and compliance reports reviewed.  Compliance data reviewed showing 73% usage > 4hours in last 30  days. Average AHI 0.3 events/hour. Pt continues to use and benefit from machine.      Current Settings auto CPAP 5-13    Reviewed both of her recent home sleep studies.  The one done in August was on a new oral appliance and the one from a week ago was done on her previous oral appliance.  Both studies indicate improvement from baseline regarding sleep apnea.  Neither study indicated nocturnal hypoxia.  Discussed that they both seem to be treating her sleep apnea relatively well.  Her old device appears to have better control on her sleep apnea and a new device.  Advised that in either case she is getting benefit from oral appliance use as an alternative to her CPAP when traveling.    PLAN:   -Order placed for mask and supplies   -May continue using oral appliance with traveling  -Advised to reach out via MyChart with questions     Has been advised to continue the current CPAP, clean equipment frequently, and get new mask and supplies as allowed by insurance and DME. Recommend an earlier appointment, if significant treatment barriers develop.    Patients with GÓMEZ are at increased risk of cardiovascular disease including coronary artery disease, systemic arterial hypertension, pulmonary  arterial hypertension, cardiac arrythmias, and stroke. The patient was advised to avoid driving a motor vehicle when drowsy.    Positive airway pressure will favorably impact many of the adverse conditions and effects provoked by GÓMEZ.    Have advised the patient to follow up with the appropriate healthcare practitioners for all other medical problems and issues.    Return in about 1 year (around 10/11/2024).      Please note portions of this record was created using voice recognition software. I have made every reasonable attempt to correct obvious errors, but I expect that there are errors of grammar and possibly content I did not discover before finalizing the note.

## 2023-11-20 DIAGNOSIS — Z01.812 PRE-PROCEDURE LAB EXAM: ICD-10-CM

## 2023-11-20 NOTE — PROGRESS NOTES
Request received to review order/ protocol for coronary CTA. Scan approved. Upon review of available medical records, the following orders have been placed:    Order placed for outpatient, non fasting creatinine blood draw to check kidney function within 30 days prior to contrast administration for coronary CTA if determined medically necessary by CT staff. Instructions for timing of blood test to be given by scheduling/ CT facility team.    This CT study may be scheduled through [facility] Scheduling at , option 2.

## 2023-11-27 ENCOUNTER — HOSPITAL ENCOUNTER (OUTPATIENT)
Dept: RADIOLOGY | Facility: MEDICAL CENTER | Age: 73
End: 2023-11-27
Attending: FAMILY MEDICINE
Payer: MEDICARE

## 2023-11-27 VITALS
RESPIRATION RATE: 16 BRPM | OXYGEN SATURATION: 95 % | DIASTOLIC BLOOD PRESSURE: 57 MMHG | HEART RATE: 52 BPM | SYSTOLIC BLOOD PRESSURE: 115 MMHG

## 2023-11-27 DIAGNOSIS — E78.1 PURE HYPERGLYCERIDEMIA: ICD-10-CM

## 2023-11-27 DIAGNOSIS — Z82.49 FAMILY HISTORY OF ISCHEMIC HEART DISEASE: ICD-10-CM

## 2023-11-27 PROCEDURE — A9270 NON-COVERED ITEM OR SERVICE: HCPCS | Performed by: RADIOLOGY

## 2023-11-27 PROCEDURE — 700102 HCHG RX REV CODE 250 W/ 637 OVERRIDE(OP): Performed by: RADIOLOGY

## 2023-11-27 PROCEDURE — 75574 CT ANGIO HRT W/3D IMAGE: CPT

## 2023-11-27 PROCEDURE — 700117 HCHG RX CONTRAST REV CODE 255: Performed by: FAMILY MEDICINE

## 2023-11-27 RX ORDER — NITROGLYCERIN 0.4 MG/1
0.4 TABLET SUBLINGUAL ONCE
Status: COMPLETED | OUTPATIENT
Start: 2023-11-27 | End: 2023-11-27

## 2023-11-27 RX ORDER — ACETAMINOPHEN 325 MG/1
650 TABLET ORAL ONCE
Status: COMPLETED | OUTPATIENT
Start: 2023-11-27 | End: 2023-11-27

## 2023-11-27 RX ADMIN — NITROGLYCERIN 0.4 MG: 0.4 TABLET, ORALLY DISINTEGRATING SUBLINGUAL at 15:48

## 2023-11-27 RX ADMIN — ACETAMINOPHEN 650 MG: 325 TABLET ORAL at 16:08

## 2023-11-27 RX ADMIN — IOHEXOL 100 ML: 350 INJECTION, SOLUTION INTRAVENOUS at 14:15

## 2023-12-05 ENCOUNTER — HOSPITAL ENCOUNTER (OUTPATIENT)
Dept: LAB | Facility: MEDICAL CENTER | Age: 73
End: 2023-12-05
Attending: FAMILY MEDICINE
Payer: MEDICARE

## 2023-12-05 PROCEDURE — 36415 COLL VENOUS BLD VENIPUNCTURE: CPT

## 2023-12-05 PROCEDURE — 83704 LIPOPROTEIN BLD QUAN PART: CPT

## 2023-12-05 PROCEDURE — 80061 LIPID PANEL: CPT | Mod: XU

## 2023-12-09 LAB
CHOLEST SERPL-MCNC: 197 MG/DL
HDL PARTICAL NO Q4363: >41 UMOL/L
HDL SIZE Q4361: 8.7 NM
HDLC SERPL-MCNC: 65 MG/DL (ref 40–59)
HLD.LARGE SERPL-SCNC: 5.3 UMOL/L
L VLDL PART NO Q4357: <1.5 NMOL/L
LDL SERPL QN: 21.2 NM
LDL SERPL-SCNC: 1192 NMOL/L
LDL SMALL SERPL-SCNC: 404 NMOL/L
LDLC SERPL CALC-MCNC: 114 MG/DL
PATHOLOGY STUDY: ABNORMAL
TRIGL SERPL-MCNC: 92 MG/DL (ref 30–149)
VLDL SIZE Q4362: 46.8 NM

## 2024-01-17 ENCOUNTER — TELEPHONE (OUTPATIENT)
Dept: CARDIOLOGY | Facility: MEDICAL CENTER | Age: 74
End: 2024-01-17
Payer: MEDICARE

## 2024-01-17 NOTE — TELEPHONE ENCOUNTER
Did patient answer the phone? YES    Was a voice mail left? NO     Has patient had labs, imaging, or cardiac testing outside [facility]? NO    Where has patient had labs, imaging, or cardiac testing done?     Did [facility] fax for records request? NO     Fax number used to request records

## 2024-01-29 ENCOUNTER — OFFICE VISIT (OUTPATIENT)
Dept: CARDIOLOGY | Facility: MEDICAL CENTER | Age: 74
End: 2024-01-29
Attending: INTERNAL MEDICINE
Payer: MEDICARE

## 2024-01-29 VITALS
BODY MASS INDEX: 25.76 KG/M2 | OXYGEN SATURATION: 97 % | SYSTOLIC BLOOD PRESSURE: 98 MMHG | WEIGHT: 140 LBS | RESPIRATION RATE: 16 BRPM | DIASTOLIC BLOOD PRESSURE: 60 MMHG | HEIGHT: 62 IN | HEART RATE: 65 BPM

## 2024-01-29 DIAGNOSIS — I25.10 CORONARY ARTERY DISEASE INVOLVING NATIVE CORONARY ARTERY OF NATIVE HEART WITHOUT ANGINA PECTORIS: ICD-10-CM

## 2024-01-29 DIAGNOSIS — E78.1 HYPERTRIGLYCERIDEMIA: ICD-10-CM

## 2024-01-29 DIAGNOSIS — E78.2 MIXED HYPERLIPIDEMIA: ICD-10-CM

## 2024-01-29 PROBLEM — R93.1 AGATSTON CAC SCORE 200-399: Status: ACTIVE | Noted: 2024-01-29

## 2024-01-29 PROBLEM — R93.1 AGATSTON CAC SCORE 200-399: Status: RESOLVED | Noted: 2024-01-29 | Resolved: 2024-01-29

## 2024-01-29 LAB — EKG IMPRESSION: NORMAL

## 2024-01-29 PROCEDURE — 99212 OFFICE O/P EST SF 10 MIN: CPT | Performed by: INTERNAL MEDICINE

## 2024-01-29 PROCEDURE — 3074F SYST BP LT 130 MM HG: CPT | Performed by: INTERNAL MEDICINE

## 2024-01-29 PROCEDURE — 93005 ELECTROCARDIOGRAM TRACING: CPT | Performed by: INTERNAL MEDICINE

## 2024-01-29 PROCEDURE — 93010 ELECTROCARDIOGRAM REPORT: CPT | Performed by: INTERNAL MEDICINE

## 2024-01-29 PROCEDURE — 3078F DIAST BP <80 MM HG: CPT | Performed by: INTERNAL MEDICINE

## 2024-01-29 PROCEDURE — 99204 OFFICE O/P NEW MOD 45 MIN: CPT | Performed by: INTERNAL MEDICINE

## 2024-01-29 RX ORDER — ROSUVASTATIN CALCIUM 5 MG/1
5 TABLET, COATED ORAL
COMMUNITY
Start: 2024-03-07 | End: 2024-03-07

## 2024-01-29 RX ORDER — SEMAGLUTIDE 1.34 MG/ML
INJECTION, SOLUTION SUBCUTANEOUS
COMMUNITY
Start: 2023-11-29

## 2024-01-29 RX ORDER — ASPIRIN 81 MG/1
81 TABLET ORAL DAILY
COMMUNITY

## 2024-01-29 ASSESSMENT — ENCOUNTER SYMPTOMS
EYES NEGATIVE: 1
HEADACHES: 0
COUGH: 0
MUSCULOSKELETAL NEGATIVE: 1
DOUBLE VISION: 0
DIZZINESS: 0
NERVOUS/ANXIOUS: 0
SHORTNESS OF BREATH: 0
WEIGHT LOSS: 0
NEUROLOGICAL NEGATIVE: 1
VOMITING: 0
FEVER: 0
PALPITATIONS: 0
NAUSEA: 0
ABDOMINAL PAIN: 0
BRUISES/BLEEDS EASILY: 0
GASTROINTESTINAL NEGATIVE: 1
RESPIRATORY NEGATIVE: 1
DEPRESSION: 0
CHILLS: 0
CONSTITUTIONAL NEGATIVE: 1
CARDIOVASCULAR NEGATIVE: 1
CLAUDICATION: 0
PSYCHIATRIC NEGATIVE: 1
WEAKNESS: 0
MYALGIAS: 0
BLURRED VISION: 0
FOCAL WEAKNESS: 0

## 2024-01-29 ASSESSMENT — FIBROSIS 4 INDEX: FIB4 SCORE: 1.11

## 2024-03-06 ENCOUNTER — HOSPITAL ENCOUNTER (OUTPATIENT)
Dept: LAB | Facility: MEDICAL CENTER | Age: 74
End: 2024-03-06
Attending: FAMILY MEDICINE
Payer: MEDICARE

## 2024-03-06 LAB
ALBUMIN SERPL BCP-MCNC: 4.5 G/DL (ref 3.2–4.9)
ALBUMIN/GLOB SERPL: 1.8 G/DL
ALP SERPL-CCNC: 95 U/L (ref 30–99)
ALT SERPL-CCNC: 13 U/L (ref 2–50)
ANION GAP SERPL CALC-SCNC: 17 MMOL/L (ref 7–16)
AST SERPL-CCNC: 25 U/L (ref 12–45)
BILIRUB SERPL-MCNC: 0.6 MG/DL (ref 0.1–1.5)
BUN SERPL-MCNC: 21 MG/DL (ref 8–22)
CALCIUM ALBUM COR SERPL-MCNC: 9.6 MG/DL (ref 8.5–10.5)
CALCIUM SERPL-MCNC: 10 MG/DL (ref 8.5–10.5)
CHLORIDE SERPL-SCNC: 107 MMOL/L (ref 96–112)
CHOLEST SERPL-MCNC: 160 MG/DL (ref 100–199)
CO2 SERPL-SCNC: 20 MMOL/L (ref 20–33)
CREAT SERPL-MCNC: 0.86 MG/DL (ref 0.5–1.4)
GFR SERPLBLD CREATININE-BSD FMLA CKD-EPI: 71 ML/MIN/1.73 M 2
GLOBULIN SER CALC-MCNC: 2.5 G/DL (ref 1.9–3.5)
GLUCOSE SERPL-MCNC: 66 MG/DL (ref 65–99)
HDLC SERPL-MCNC: 55 MG/DL
LDLC SERPL CALC-MCNC: 93 MG/DL
POTASSIUM SERPL-SCNC: 4.9 MMOL/L (ref 3.6–5.5)
PROT SERPL-MCNC: 7 G/DL (ref 6–8.2)
SODIUM SERPL-SCNC: 144 MMOL/L (ref 135–145)
TRIGL SERPL-MCNC: 62 MG/DL (ref 0–149)

## 2024-03-06 PROCEDURE — 36415 COLL VENOUS BLD VENIPUNCTURE: CPT

## 2024-03-06 PROCEDURE — 80053 COMPREHEN METABOLIC PANEL: CPT

## 2024-03-06 PROCEDURE — 80061 LIPID PANEL: CPT

## 2024-03-07 ENCOUNTER — PATIENT MESSAGE (OUTPATIENT)
Dept: CARDIOLOGY | Facility: MEDICAL CENTER | Age: 74
End: 2024-03-07
Payer: MEDICARE

## 2024-03-07 DIAGNOSIS — E78.1 HYPERTRIGLYCERIDEMIA: ICD-10-CM

## 2024-03-07 DIAGNOSIS — E78.2 MIXED HYPERLIPIDEMIA: ICD-10-CM

## 2024-03-07 DIAGNOSIS — I25.10 CORONARY ARTERY DISEASE INVOLVING NATIVE CORONARY ARTERY OF NATIVE HEART WITHOUT ANGINA PECTORIS: ICD-10-CM

## 2024-03-07 RX ORDER — ROSUVASTATIN CALCIUM 5 MG/1
5 TABLET, COATED ORAL
Qty: 90 TABLET | Refills: 1 | Status: SHIPPED | OUTPATIENT
Start: 2024-03-07 | End: 2024-03-07 | Stop reason: SDUPTHER

## 2024-03-07 RX ORDER — ROSUVASTATIN CALCIUM 5 MG/1
5 TABLET, COATED ORAL
Qty: 90 TABLET | Refills: 1 | Status: SHIPPED | OUTPATIENT
Start: 2024-03-07

## 2024-03-10 ENCOUNTER — PATIENT MESSAGE (OUTPATIENT)
Dept: CARDIOLOGY | Facility: MEDICAL CENTER | Age: 74
End: 2024-03-10
Payer: MEDICARE

## 2024-05-14 ENCOUNTER — APPOINTMENT (OUTPATIENT)
Dept: CARDIOLOGY | Facility: MEDICAL CENTER | Age: 74
End: 2024-05-14
Attending: INTERNAL MEDICINE
Payer: MEDICARE

## 2024-07-18 NOTE — TELEPHONE ENCOUNTER
From: Audrey Aquino  To: Airam Ardon M.D.  Sent: 2020 1:27 PM PST  Subject: Prescription Question    I'm sorry but the pharmacy says that they do not have your rx for the inhaler.   can it be called in ? - the tech said that that is better for them and more efficient than an electronic rx - it seems that there can be a long delay with the electronic rx   Audrey rogers      ----- Message -----    From:Airam Ardon M.D.   Sent:2020 12:01 PM PST   To:Audrey Aquino   Subject:RE: Prescription Question    That is fine make an appointment after you get the vaccine      ----- Message -----   From:Audrey Aquino   Sent:2020 11:58 AM PST   To:Airam Ardon M.D.   Subject:Prescription Question    thank you. Happy Solstice season.  When should I schedule another telemedicine visit with you?  do you want to have a visit before the end of the year?   I want to wait to come in to the office until after I get the covid vaccine.      ----- Message -----   From:Airam Ardon M.D.   Sent:2020 11:55 AM PST   To:Audrey Aquino   Subject:RE: Prescription Question    Symbicort sent to the pharmacy      ----- Message -----   From:Audrey Aquino   Sent:2020 10:29 AM PST   To:Airam Ardon M.D.   Subject:Prescription Question    As you can see I have a neg Covid test. However, I have some kind of pretty severe respiratory illness , presumably aother virus, and my cough-equivalent asthma has kicked in making it really hard to sleep and function. I have been using the ADVAIR inhaler but the one I have is significantly . Unfortunately, without a pre-auth, this is not covered by my pharmacy insurance.  Would you call in Symbicort - I am assuming the stronger strength - to [facility]? I will pick it up today.   (I usually use [facility] but I don't want to go into the building).   thank you.  
place, and time.   Psychiatric:         Mood and Affect: Mood normal.         Behavior: Behavior normal.         Thought Content: Thought content normal.         Judgment: Judgment normal.                   Assessment and Plan:  Brooke was seen today for urinary frequency.    Diagnoses and all orders for this visit:    Urinary frequency  -     POCT Urinalysis no Micro    Dysuria    Urinary tract infection with hematuria, site unspecified  -     Culture, Urine; Future  -     Culture, Urine    Other orders  -     nitrofurantoin, macrocrystal-monohydrate, (MACROBID) 100 MG capsule; Take 1 capsule by mouth 2 times daily for 7 days    Plan: Urine dip is positive.  Will send CNS.  Start Macrobid.  Warned of potential side effects.  Probiotic.  Push fluids.  Follow-up with PCP.  Notify us if not improving.    Return for fu pcp.    Seen By:  Raudel Sanchez MD      *Document was created using voice recognition software.  Note was reviewed however may contain grammatical errors.

## 2024-08-13 ENCOUNTER — HOSPITAL ENCOUNTER (OUTPATIENT)
Dept: LAB | Facility: MEDICAL CENTER | Age: 74
End: 2024-08-13
Attending: FAMILY MEDICINE
Payer: MEDICARE

## 2024-08-13 LAB
ALBUMIN SERPL BCP-MCNC: 4.1 G/DL (ref 3.2–4.9)
ALBUMIN/GLOB SERPL: 2 G/DL
ALP SERPL-CCNC: 98 U/L (ref 30–99)
ALT SERPL-CCNC: 15 U/L (ref 2–50)
ANION GAP SERPL CALC-SCNC: 12 MMOL/L (ref 7–16)
AST SERPL-CCNC: 26 U/L (ref 12–45)
BASOPHILS # BLD AUTO: 0.7 % (ref 0–1.8)
BASOPHILS # BLD: 0.03 K/UL (ref 0–0.12)
BILIRUB SERPL-MCNC: 0.4 MG/DL (ref 0.1–1.5)
BUN SERPL-MCNC: 17 MG/DL (ref 8–22)
CALCIUM ALBUM COR SERPL-MCNC: 9 MG/DL (ref 8.5–10.5)
CALCIUM SERPL-MCNC: 9.1 MG/DL (ref 8.5–10.5)
CHLORIDE SERPL-SCNC: 103 MMOL/L (ref 96–112)
CHOLEST SERPL-MCNC: 154 MG/DL (ref 100–199)
CO2 SERPL-SCNC: 22 MMOL/L (ref 20–33)
CREAT SERPL-MCNC: 0.82 MG/DL (ref 0.5–1.4)
EOSINOPHIL # BLD AUTO: 0.08 K/UL (ref 0–0.51)
EOSINOPHIL NFR BLD: 1.9 % (ref 0–6.9)
ERYTHROCYTE [DISTWIDTH] IN BLOOD BY AUTOMATED COUNT: 43.8 FL (ref 35.9–50)
FASTING STATUS PATIENT QL REPORTED: NORMAL
GFR SERPLBLD CREATININE-BSD FMLA CKD-EPI: 75 ML/MIN/1.73 M 2
GLOBULIN SER CALC-MCNC: 2.1 G/DL (ref 1.9–3.5)
GLUCOSE SERPL-MCNC: 86 MG/DL (ref 65–99)
HCT VFR BLD AUTO: 39.9 % (ref 37–47)
HDLC SERPL-MCNC: 63 MG/DL
HGB BLD-MCNC: 13.2 G/DL (ref 12–16)
IMM GRANULOCYTES # BLD AUTO: 0.02 K/UL (ref 0–0.11)
IMM GRANULOCYTES NFR BLD AUTO: 0.5 % (ref 0–0.9)
LDLC SERPL CALC-MCNC: 78 MG/DL
LYMPHOCYTES # BLD AUTO: 1.37 K/UL (ref 1–4.8)
LYMPHOCYTES NFR BLD: 31.7 % (ref 22–41)
MCH RBC QN AUTO: 29.3 PG (ref 27–33)
MCHC RBC AUTO-ENTMCNC: 33.1 G/DL (ref 32.2–35.5)
MCV RBC AUTO: 88.7 FL (ref 81.4–97.8)
MONOCYTES # BLD AUTO: 0.35 K/UL (ref 0–0.85)
MONOCYTES NFR BLD AUTO: 8.1 % (ref 0–13.4)
NEUTROPHILS # BLD AUTO: 2.47 K/UL (ref 1.82–7.42)
NEUTROPHILS NFR BLD: 57.1 % (ref 44–72)
NRBC # BLD AUTO: 0 K/UL
NRBC BLD-RTO: 0 /100 WBC (ref 0–0.2)
PLATELET # BLD AUTO: 328 K/UL (ref 164–446)
PMV BLD AUTO: 10 FL (ref 9–12.9)
POTASSIUM SERPL-SCNC: 3.9 MMOL/L (ref 3.6–5.5)
PROT SERPL-MCNC: 6.2 G/DL (ref 6–8.2)
RBC # BLD AUTO: 4.5 M/UL (ref 4.2–5.4)
SODIUM SERPL-SCNC: 137 MMOL/L (ref 135–145)
TRIGL SERPL-MCNC: 64 MG/DL (ref 0–149)
WBC # BLD AUTO: 4.3 K/UL (ref 4.8–10.8)

## 2024-08-13 PROCEDURE — 80061 LIPID PANEL: CPT

## 2024-08-13 PROCEDURE — 36415 COLL VENOUS BLD VENIPUNCTURE: CPT

## 2024-08-13 PROCEDURE — 83695 ASSAY OF LIPOPROTEIN(A): CPT

## 2024-08-13 PROCEDURE — 85025 COMPLETE CBC W/AUTO DIFF WBC: CPT

## 2024-08-13 PROCEDURE — 80053 COMPREHEN METABOLIC PANEL: CPT

## 2024-08-15 LAB — LPA SERPL-MCNC: 64 MG/DL

## 2024-08-18 ENCOUNTER — PATIENT MESSAGE (OUTPATIENT)
Dept: CARDIOLOGY | Facility: MEDICAL CENTER | Age: 74
End: 2024-08-18
Payer: MEDICARE

## 2024-08-30 ENCOUNTER — TELEPHONE (OUTPATIENT)
Dept: CARDIOLOGY | Facility: MEDICAL CENTER | Age: 74
End: 2024-08-30
Payer: MEDICARE

## 2024-08-30 ENCOUNTER — PATIENT MESSAGE (OUTPATIENT)
Dept: CARDIOLOGY | Facility: MEDICAL CENTER | Age: 74
End: 2024-08-30
Payer: MEDICARE

## 2024-08-31 NOTE — PROGRESS NOTES
LDL improvement, however, not at target level with elevated Lp(a)  and inability to take rosuvastatin more than every 3 days noted. Please refer to [facility]. Thank you.  LAMONT

## 2024-09-20 NOTE — PROGRESS NOTES
Chief Complaint   Patient presents with    Hyperlipidemia     Fv Dx:  Mixed hyperlipidemia    New Patient       Subjective     Audrey Aquino is a 73 y.o. female who presents today as a consult from [facility] for positive CT coronary calcium study.     Thank you for allowing me to evaluate Dr. Kenia, who as you know is a 73 year old female with dyslipidemia,lifelong nonsmoker, family history of coronary artery disease with her father. She underwent CT coronary calcium study which was positive as described. She is clinically doing well. She denies chest pain, shortness of breath, palpitations, nausea/vomiting or diaphoresis. She keeps active with various activities. 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    Past Medical History:   Diagnosis Date    Asthma     cough equivalent    CAD (coronary artery disease)     Dry eyes     Frequent headaches     Gingivitis     History of hormone therapy     Hyperlipidemia     Knee pain     Overweight     Shoulder injury     Sleep disorder      Past Surgical History:   Procedure Laterality Date    EYE SURGERY      right cataract remobal/lens placement     Family History   Problem Relation Age of Onset    Cancer Mother         breast    Heart Attack Father     Heart Disease Father      Social History     Socioeconomic History    Marital status:      Spouse name: Not on file    Number of children: Not on file    Years of education: Not on file    Highest education level: Professional school degree (e.g., MD, DDS, DVM, FRANK)   Occupational History    Not on file   Tobacco Use    Smoking status: Never    Smokeless tobacco: Never   Vaping Use    Vaping Use: Never used   Substance and Sexual Activity    Alcohol use: Yes     Comment: 2-3 drinks a week     Drug use: Yes     Types: Marijuana     Comment: indica tablets for sleep    Sexual activity: Not Currently   Other Topics Concern     Service No    Blood Transfusions No    Caffeine Concern No    Occupational  Exposure No    Hobby Hazards Yes    Sleep Concern Yes    Stress Concern No    Weight Concern Yes    Special Diet No    Back Care No    Exercise Yes    Bike Helmet No    Seat Belt Yes    Self-Exams Yes   Social History Narrative    Not on file     Social Determinants of Health     Financial Resource Strain: Low Risk  (4/26/2022)    Overall Financial Resource Strain (CARDIA)     Difficulty of Paying Living Expenses: Not hard at all   Food Insecurity: No Food Insecurity (4/26/2022)    Hunger Vital Sign     Worried About Running Out of Food in the Last Year: Never true     Ran Out of Food in the Last Year: Never true   Transportation Needs: No Transportation Needs (4/26/2022)    PRAPARE - Transportation     Lack of Transportation (Medical): No     Lack of Transportation (Non-Medical): No   Physical Activity: Sufficiently Active (4/26/2022)    Exercise Vital Sign     Days of Exercise per Week: 7 days     Minutes of Exercise per Session: 40 min   Stress: No Stress Concern Present (4/26/2022)    [place] [employer] - Occupational Stress Questionnaire     Feeling of Stress : Not at all   Social Connections: Moderately Integrated (4/26/2022)    Social Connection and Isolation Panel [NHANES]     Frequency of Communication with Friends and Family: More than three times a week     Frequency of Social Gatherings with Friends and Family: More than three times a week     Attends [employer] Services: More than 4 times per year     Active Member of Clubs or Organizations: Yes     Attends Club or Organization Meetings: More than 4 times per year     Marital Status:    Intimate Partner Violence: Not on file   Housing Stability: Unknown (4/26/2022)    Housing Stability Vital Sign     Unable to Pay for Housing in the Last Year: No     Number of Places Lived in the Last Year: Not on file     Unstable Housing in the Last Year: No     Allergies   Allergen Reactions    Penicillins      Never taken but has family  "history of anaphylaxis death    Codeine      \"Makes me sick\"     (Medications reviewed.)  Outpatient Encounter Medications as of 1/29/2024   Medication Sig Dispense Refill    rosuvastatin (CRESTOR) 5 MG Tab       OZEMPIC, 1 MG/DOSE, 4 MG/3ML Solution Pen-injector       aspirin 81 MG EC tablet Take 81 mg by mouth every day.      fenofibrate (TRICOR) 145 MG Tab TAKE 1 TABLET DAILY 90 Tablet 3    Estradiol 0.025 MG/24HR PATCH BIWEEKLY APPLY 1 PATCH TWICE WEEKLY AS DIRECTED 24 Patch 3    budesonide-formoterol (SYMBICORT) 160-4.5 MCG/ACT Aerosol Inhale 2 Puffs 2 Times a Day. 1 Each 2    Coenzyme Q10 (COQ10) 100 MG Cap Take  by mouth.      Multiple Vitamins-Minerals (DRY EYE FORMULA) Cap Take  by mouth.      CALCIUM-MAGNESIUM PO Take  by mouth.       No facility-administered encounter medications on file as of 1/29/2024.     Review of Systems   Constitutional: Negative.  Negative for chills, fever, malaise/fatigue and weight loss.   HENT: Negative.  Negative for hearing loss.    Eyes: Negative.  Negative for blurred vision and double vision.   Respiratory: Negative.  Negative for cough and shortness of breath.    Cardiovascular: Negative.  Negative for chest pain, palpitations, claudication and leg swelling.   Gastrointestinal: Negative.  Negative for abdominal pain, nausea and vomiting.   Genitourinary: Negative.  Negative for dysuria and urgency.   Musculoskeletal: Negative.  Negative for joint pain and myalgias.   Skin: Negative.  Negative for itching and rash.   Neurological: Negative.  Negative for dizziness, focal weakness, weakness and headaches.   Endo/Heme/Allergies: Negative.  Does not bruise/bleed easily.   Psychiatric/Behavioral: Negative.  Negative for depression. The patient is not nervous/anxious.               Objective     BP 98/60 (BP Location: Left arm, Patient Position: Sitting, BP Cuff Size: Adult)   Pulse 65   Resp 16   Ht 1.562 m (5' 1.5\")   Wt 63.5 kg (140 lb)   SpO2 97%   BMI 26.02 kg/m² "     Physical Exam  Constitutional:       Appearance: Normal appearance. She is well-developed and normal weight.   HENT:      Head: Normocephalic and atraumatic.   Neck:      Vascular: No JVD.   Cardiovascular:      Rate and Rhythm: Normal rate and regular rhythm.      Heart sounds: Normal heart sounds.   Pulmonary:      Effort: Pulmonary effort is normal.      Breath sounds: Normal breath sounds.   Abdominal:      General: Bowel sounds are normal.      Palpations: Abdomen is soft.      Comments: No hepatosplenomegaly.   Musculoskeletal:         General: Normal range of motion.   Lymphadenopathy:      Cervical: No cervical adenopathy.   Skin:     General: Skin is warm and dry.   Neurological:      Mental Status: She is alert and oriented to person, place, and time.            CARDIAC STUDIES/PROCEDURES:    CT CARDIAC SCORING (11/27/23)  Coronary Calcium Score:  LMA - 0.0  LCX - 0.0  LAD - 72.1  RCA - 136.9  PDA - 0.0  Total Calcium Score: 209.0  Percentile: Calcium score is above the 50th percentile for the patient's age and sex.  Coronary CTA:  Left main: Patent.  LAD and Diagonals: A small focal calcified plaque in the proximal LAD. No significant stenosis.  LCX and Obtuse Marginals: No plaque or stenosis.  RCA and Posterior Descending: Calcified plaques in the RCA. No significant stenosis.  Right dominant circulation.  Cardiac Structure and Morphology:  Within normal limits. No chamber dilatation or evident septal defect.  CT Chest:  Minimal bibasilar atelectasis. No pleural space process is seen. There is no abnormal volume of pericardial fluid.  3D angiographic/MIP images of the vasculature confirm the vascular findings as described above.  IMPRESSION:  1. Calcified plaque in the LAD and RCA. No significant coronary stenosis.  2. Calcium Score of 100-399 AND <75th percentile:  (study result reviewed)     EKG was ordered for CT coronary calcium study, performed on (01/29/24) was reviewed: EKG, personally  interpreted shows sinus rhythm.    Laboratory results of (12/05/23) were reviewed. Cholesterol profile of 197/92/65/91 mg/dL noted.     Assessment & Plan     1. Coronary artery disease involving native coronary artery of native heart without angina pectoris        2. Mixed hyperlipidemia        3. Hypertriglyceridemia  EKG          Medical Decision Making: Today's Assessment/Status/Plan:        Coronary artery disease: She remains clinically doing well. I will continue with current medical care including rosuvastatin. We will discontinue aspirin.   Hyperlipidemia: She is doing well on statin therapy without myalgia symptoms.    We will follow up in 3 months.    Thank you for this consult.                    - c/w atorvastatin

## 2024-10-01 ENCOUNTER — PATIENT MESSAGE (OUTPATIENT)
Dept: CARDIOLOGY | Facility: MEDICAL CENTER | Age: 74
End: 2024-10-01
Payer: MEDICARE

## 2024-10-01 DIAGNOSIS — E78.2 MIXED HYPERLIPIDEMIA: ICD-10-CM

## 2024-10-02 ENCOUNTER — TELEPHONE (OUTPATIENT)
Dept: CARDIOLOGY | Facility: MEDICAL CENTER | Age: 74
End: 2024-10-02
Payer: MEDICARE

## 2024-10-07 ENCOUNTER — TELEPHONE (OUTPATIENT)
Dept: HEALTH INFORMATION MANAGEMENT | Facility: OTHER | Age: 74
End: 2024-10-07
Payer: MEDICARE

## 2024-10-15 ENCOUNTER — HOSPITAL ENCOUNTER (OUTPATIENT)
Dept: RADIOLOGY | Facility: MEDICAL CENTER | Age: 74
End: 2024-10-15
Attending: FAMILY MEDICINE
Payer: MEDICARE

## 2024-10-15 DIAGNOSIS — Z12.31 VISIT FOR SCREENING MAMMOGRAM: ICD-10-CM

## 2024-10-15 PROCEDURE — 77067 SCR MAMMO BI INCL CAD: CPT

## 2024-11-25 ENCOUNTER — OFFICE VISIT (OUTPATIENT)
Dept: SLEEP MEDICINE | Facility: MEDICAL CENTER | Age: 74
End: 2024-11-25
Attending: STUDENT IN AN ORGANIZED HEALTH CARE EDUCATION/TRAINING PROGRAM
Payer: MEDICARE

## 2024-11-25 VITALS
WEIGHT: 130 LBS | BODY MASS INDEX: 23.92 KG/M2 | RESPIRATION RATE: 16 BRPM | SYSTOLIC BLOOD PRESSURE: 106 MMHG | HEART RATE: 64 BPM | DIASTOLIC BLOOD PRESSURE: 62 MMHG | OXYGEN SATURATION: 98 % | HEIGHT: 62 IN

## 2024-11-25 DIAGNOSIS — G47.33 OSA (OBSTRUCTIVE SLEEP APNEA): Primary | ICD-10-CM

## 2024-11-25 PROCEDURE — 99213 OFFICE O/P EST LOW 20 MIN: CPT | Performed by: STUDENT IN AN ORGANIZED HEALTH CARE EDUCATION/TRAINING PROGRAM

## 2024-11-25 PROCEDURE — 3074F SYST BP LT 130 MM HG: CPT | Performed by: STUDENT IN AN ORGANIZED HEALTH CARE EDUCATION/TRAINING PROGRAM

## 2024-11-25 PROCEDURE — 3078F DIAST BP <80 MM HG: CPT | Performed by: STUDENT IN AN ORGANIZED HEALTH CARE EDUCATION/TRAINING PROGRAM

## 2024-11-25 PROCEDURE — 99211 OFF/OP EST MAY X REQ PHY/QHP: CPT | Performed by: STUDENT IN AN ORGANIZED HEALTH CARE EDUCATION/TRAINING PROGRAM

## 2024-11-25 ASSESSMENT — PATIENT HEALTH QUESTIONNAIRE - PHQ9: CLINICAL INTERPRETATION OF PHQ2 SCORE: 0

## 2024-11-25 ASSESSMENT — FIBROSIS 4 INDEX: FIB4 SCORE: 1.49

## 2024-11-25 NOTE — PROGRESS NOTES
[facility] Follow-up Visit    CC: Yearly follow-up for management of obstructive sleep apnea      HPI:  Audrey Aquino is a 74 y.o.female  with hypertriglyceridemia, allergic rhinitis, and obstructive sleep apnea on CPAP.  Presents today to follow-up regarding management of obstructive sleep apnea.    She continues to use her CPAP machine regularly.  On nights when she does not use her CPAP she will often use her oral appliance.  She does travel a lot and camp in remote locations.  She is interested in potentially getting a CPAP with a backup battery system.  Currently finds her mask and pressures comfortable.  Has no acute complaints at this time    DME provider: [employer]   Device: Airsense 10   Mask: nasal pillow   Aerophagia: No   Snoring: No   Dry mouth: Yes  Leak: No   Skin irritation: No   Chin strap: No        Patient Active Problem List    Diagnosis Date Noted    Coronary artery disease involving native coronary artery without angina pectoris 01/29/2024    Hypertriglyceridemia 04/27/2020    Cough variant asthma 10/16/2018    Hx of total hysterectomy 10/16/2018    Vaginal atrophy 10/16/2018    Obstructive sleep apnea syndrome 09/12/2014    History of radial keratotomy 12/08/2010    Acquired absence of other genital organ(s) 12/22/2008    Mixed hyperlipidemia 09/21/2007    Diverticulosis of colon 10/20/2006    Allergic rhinitis 01/15/2003       Past Medical History:   Diagnosis Date    Asthma     cough equivalent    CAD (coronary artery disease)     Dry eyes     Frequent headaches     Gingivitis     History of hormone therapy     Hyperlipidemia     Knee pain     Overweight     Shoulder injury     Sleep disorder         Past Surgical History:   Procedure Laterality Date    EYE SURGERY      right cataract remobal/lens placement       Family History   Problem Relation Age of Onset    Cancer Mother         breast    Heart Attack Father     Heart Disease Father        Social History     Socioeconomic  History    Marital status:      Spouse name: Not on file    Number of children: Not on file    Years of education: Not on file    Highest education level: Professional school degree (e.g., MD, DDS, DVM, FRANK)   Occupational History    Not on file   Tobacco Use    Smoking status: Never    Smokeless tobacco: Never   Vaping Use    Vaping status: Never Used   Substance and Sexual Activity    Alcohol use: Yes     Comment: 2-3 drinks a week     Drug use: Yes     Types: Marijuana     Comment: indica tablets for sleep    Sexual activity: Not Currently   Other Topics Concern     Service No    Blood Transfusions No    Caffeine Concern No    Occupational Exposure No    Hobby Hazards Yes    Sleep Concern Yes    Stress Concern No    Weight Concern Yes    Special Diet No    Back Care No    Exercise Yes    Bike Helmet No    Seat Belt Yes    Self-Exams Yes   Social History Narrative    Not on file     Social Drivers of Health     Financial Resource Strain: Low Risk  (4/26/2022)    Overall Financial Resource Strain (CARDIA)     Difficulty of Paying Living Expenses: Not hard at all   Food Insecurity: No Food Insecurity (4/26/2022)    Hunger Vital Sign     Worried About Running Out of Food in the Last Year: Never true     Ran Out of Food in the Last Year: Never true   Transportation Needs: No Transportation Needs (4/26/2022)    PRAPARE - Transportation     Lack of Transportation (Medical): No     Lack of Transportation (Non-Medical): No   Physical Activity: Sufficiently Active (4/26/2022)    Exercise Vital Sign     Days of Exercise per Week: 7 days     Minutes of Exercise per Session: 40 min   Stress: No Stress Concern Present (4/26/2022)    [place] of [employer]ational [employer] - Occupational Stress Questionnaire     Feeling of Stress : Not at all   Social Connections: Moderately Integrated (4/26/2022)    Social Connection and Isolation Panel [NHANES]     Frequency of Communication with Friends and Family: More than  "three times a week     Frequency of Social Gatherings with Friends and Family: More than three times a week     Attends Yarsanism Services: More than 4 times per year     Active Member of Clubs or Organizations: Yes     Attends Club or Organization Meetings: More than 4 times per year     Marital Status:    Intimate Partner Violence: Not on file   Housing Stability: Unknown (4/26/2022)    Housing Stability Vital Sign     Unable to Pay for Housing in the Last Year: No     Number of Places Lived in the Last Year: Not on file     Unstable Housing in the Last Year: No       Current Outpatient Medications   Medication Sig Dispense Refill    rosuvastatin (CRESTOR) 5 MG Tab Take 1 Tablet by mouth every 48 hours. 90 Tablet 1    OZEMPIC, 1 MG/DOSE, 4 MG/3ML Solution Pen-injector       fenofibrate (TRICOR) 145 MG Tab TAKE 1 TABLET DAILY 90 Tablet 3    Estradiol 0.025 MG/24HR PATCH BIWEEKLY APPLY 1 PATCH TWICE WEEKLY AS DIRECTED 24 Patch 3    budesonide-formoterol (SYMBICORT) 160-4.5 MCG/ACT Aerosol Inhale 2 Puffs 2 Times a Day. 1 Each 2    Coenzyme Q10 (COQ10) 100 MG Cap Take  by mouth.      Multiple Vitamins-Minerals (DRY EYE FORMULA) Cap Take  by mouth.      CALCIUM-MAGNESIUM PO Take  by mouth.      aspirin 81 MG EC tablet Take 81 mg by mouth every day.       No current facility-administered medications for this visit.        ALLERGIES: Penicillins and Codeine    ROS  Constitutional: Denies fevers, Denies weight changes  Ears/Nose/Throat/Mouth: Denies nasal congestion or sore throat   Cardiovascular: Denies chest pain  Respiratory: Denies shortness of breath, Denies cough  Gastrointestinal/Hepatic: Denies nausea, vomiting  Sleep: see HPI      PHYSICAL EXAM  /62 (BP Location: Left arm, Patient Position: Sitting, BP Cuff Size: Adult)   Pulse 64   Resp 16   Ht 1.575 m (5' 2\")   Wt 59 kg (130 lb)   SpO2 98%   BMI 23.78 kg/m²   Appearance: Well-nourished, well-developed, no acute distress  Eyes:  No scleral " icterus , EOMI  Musculoskeletal:  Grossly normal; gait and station normal; digits and nails normal  Skin:  No rashes, petechiae, cyanosis  Neurologic: without focal signs; oriented to person, time, place, and purpose; judgement intact      Medical Decision Making   Assessment and Plan  Audrey Aquino is a 74 y.o.female  with hypertriglyceridemia, allergic rhinitis, and obstructive sleep apnea on CPAP.  Presents today to follow-up regarding management of obstructive sleep apnea.    The medical record was reviewed.    Obstructive sleep apnea  Diagnostic and titration nocturnal polysomnogram's, home sleep apnea tests, continuous nocturnal oximetry results, multiple sleep latency tests, and compliance reports reviewed.  Compliance data reviewed showing 63% usage > 4hours in last 30  days. Average AHI 0.4 events/hour. Pt continues to use and benefit from machine.      Current Settings auto CPAP 5-13 cmH2O    Questions regarding overnight oximetry.  Discussed home overnight oximeter's.  May consider [facility].    PLAN:   -Order placed for mask and supplies   -Prescription printed for patient regarding travel CPAP  -Advised to reach out via MyChart with questions     Has been advised to continue the current CPAP, clean equipment frequently, and get new mask and supplies as allowed by insurance and DME. Recommend an earlier appointment, if significant treatment barriers develop.    Patients with GÓMEZ are at increased risk of cardiovascular disease including coronary artery disease, systemic arterial hypertension, pulmonary arterial hypertension, cardiac arrythmias, and stroke. They are advised to avoid driving a motor vehicle when drowsy..    Return in about 1 year (around 11/25/2025).      Please note portions of this record was created using voice recognition software. I have made every reasonable attempt to correct obvious errors, but I expect that there are errors of grammar and possibly content I did not discover before  finalizing the note.

## 2025-01-31 ENCOUNTER — OFFICE VISIT (OUTPATIENT)
Dept: CARDIOLOGY | Facility: MEDICAL CENTER | Age: 75
End: 2025-01-31
Attending: INTERNAL MEDICINE
Payer: MEDICARE

## 2025-01-31 VITALS
HEART RATE: 53 BPM | DIASTOLIC BLOOD PRESSURE: 59 MMHG | WEIGHT: 130 LBS | SYSTOLIC BLOOD PRESSURE: 107 MMHG | BODY MASS INDEX: 23.92 KG/M2 | HEIGHT: 62 IN

## 2025-01-31 DIAGNOSIS — E78.2 MIXED HYPERLIPIDEMIA: ICD-10-CM

## 2025-01-31 DIAGNOSIS — I25.10 CORONARY ARTERY DISEASE INVOLVING NATIVE CORONARY ARTERY OF NATIVE HEART WITHOUT ANGINA PECTORIS: ICD-10-CM

## 2025-01-31 DIAGNOSIS — E78.1 HYPERTRIGLYCERIDEMIA: ICD-10-CM

## 2025-01-31 DIAGNOSIS — E78.41 HIGH SERUM LIPOPROTEIN(A): ICD-10-CM

## 2025-01-31 PROCEDURE — 99212 OFFICE O/P EST SF 10 MIN: CPT

## 2025-01-31 RX ORDER — ASPIRIN 81 MG/1
81 TABLET, CHEWABLE ORAL DAILY
Qty: 100 TABLET | Refills: 11 | Status: SHIPPED | OUTPATIENT
Start: 2025-01-31

## 2025-01-31 RX ORDER — EZETIMIBE 10 MG/1
10 TABLET ORAL DAILY
Qty: 90 TABLET | Refills: 3 | Status: SHIPPED | OUTPATIENT
Start: 2025-01-31

## 2025-01-31 RX ORDER — ROSUVASTATIN CALCIUM 5 MG/1
5 TABLET, COATED ORAL
Qty: 45 TABLET | Refills: 3 | Status: SHIPPED | OUTPATIENT
Start: 2025-01-31

## 2025-01-31 ASSESSMENT — FIBROSIS 4 INDEX: FIB4 SCORE: 1.49

## 2025-01-31 NOTE — PROGRESS NOTES
[facility]ILY L[facility]ID [facility]INIC - INITIAL VISIT   01/31/25     Audrey Aquino has been referred for evaluation and management of dyslipidemia  Referral Source: Aamir Bacon M.D.   Date First Established in Clinic:     Subjective      CURRENT MED MGMT  Current Lipid Lowering Meds:   Statin: rosuvastatin 5 mg 2x per week  Non-Statin: Fenofibrate  Supplements: None  Current adverse drug reactions/side effects? Yes - mild myalgas  Adherence? yes    LIFESTYLE MGMT  Change in weight: 30 lb down since starting   Exercise habits: walks or swims 40-45 min,   Dietary patterns: lots of protein; low calories  Etoh: see sochx  Barriers to care/SDOH: none  Tobacco:  reports that she has never smoked. She has never used smokeless tobacco.     PERTINENT HLD PMHX  Age at Initial Diagnosis of Dyslipidemia: 40s - first high trigs  Baseline Lipids Prior to Treatment:   Lab Results   Component Value Date/Time    CHOLSTRLTOT 154 08/13/2024 09:18 AM    LDL 78 08/13/2024 09:18 AM    HDL 63 08/13/2024 09:18 AM    TRIGLYCERIDE 64 08/13/2024 09:18 AM       History of ASCVD: None  Other Established (non-atherosclerotic) Vascular Disease, if Present: None  Secondary causes of dyslipidemia:  Endocrine/Hypothyroidism:  no  Liver disease: none reported   Renal disease/nephrotic syndrome:  none reported  Dietary-induced (ketogenic, lean mass hyper-responder)? no  Medications: Estrogen  Previously Attempted Interventions for Lipids - including outcome  Statin: lova, simva, atorva    Outcome: lifestyle limited muscle pain  Non-Statin: none  Outcome: not applicable    OTHER CARDIOVASCULAR RISK FACTORS  Antithrombotic therapy: none - easy bruising  Blood pressure: normal always  Dysglycemia: started on ozempic a year ago for weight loss - down 30 pounds    Patient Active Problem List   Diagnosis    Cough variant asthma    Allergic rhinitis    History of radial keratotomy    Mixed hyperlipidemia    Hx of total hysterectomy    Acquired absence of other  "genital organ(s)    Diverticulosis of colon    Obstructive sleep apnea syndrome    Vaginal atrophy    Hypertriglyceridemia    Coronary artery disease involving native coronary artery without angina pectoris    High serum lipoprotein(a)      has a past surgical history that includes eye surgery.    Current Outpatient Medications on File Prior to Visit   Medication Sig Dispense Refill    OZEMPIC, 1 MG/DOSE, 4 MG/3ML Solution Pen-injector       fenofibrate (TRICOR) 145 MG Tab TAKE 1 TABLET DAILY 90 Tablet 3    Estradiol 0.025 MG/24HR PATCH BIWEEKLY APPLY 1 PATCH TWICE WEEKLY AS DIRECTED 24 Patch 3    budesonide-formoterol (SYMBICORT) 160-4.5 MCG/ACT Aerosol Inhale 2 Puffs 2 Times a Day. 1 Each 2    Coenzyme Q10 (COQ10) 100 MG Cap Take  by mouth.      Multiple Vitamins-Minerals (DRY EYE FORMULA) Cap Take  by mouth.      CALCIUM-MAGNESIUM PO Take  by mouth.       No current facility-administered medications on file prior to visit.      Allergies   Allergen Reactions    Penicillins      Never taken but has family history of anaphylaxis death    Codeine      \"Makes me sick\"      Family History   Problem Relation Age of Onset    Cancer Mother         breast    Heart Attack Father     Heart Disease Father      Social History     Tobacco Use    Smoking status: Never    Smokeless tobacco: Never   Vaping Use    Vaping status: Never Used   Substance Use Topics    Alcohol use: Yes     Comment: 2-3 drinks a week     Drug use: Yes     Types: Marijuana     Comment: indica tablets for sleep         Objective    Vitals:    01/31/25 1021   BP: 107/59   BP Location: Left arm   Patient Position: Sitting   BP Cuff Size: Adult   Pulse: (!) 53   Weight: 59 kg (130 lb)   Height: 1.575 m (5' 2\")      BMI Readings from Last 1 Encounters:   01/31/25 23.78 kg/m²      Wt Readings from Last 3 Encounters:   01/31/25 59 kg (130 lb)   11/25/24 59 kg (130 lb)   01/29/24 63.5 kg (140 lb)     BP Readings from Last 5 Encounters:   01/31/25 107/59 " "  11/25/24 106/62   01/29/24 98/60   11/27/23 115/57   10/11/23 102/60     Physical Exam  Vitals reviewed.   Constitutional:       General: She is not in acute distress.     Appearance: She is not diaphoretic.   HENT:      Head: Normocephalic and atraumatic.   Eyes:      General: No scleral icterus.     Conjunctiva/sclera: Conjunctivae normal.   Neck:      Vascular: No carotid bruit.   Cardiovascular:      Rate and Rhythm: Normal rate and regular rhythm.      Heart sounds: Normal heart sounds. No murmur heard.  Pulmonary:      Effort: Pulmonary effort is normal. No respiratory distress.      Breath sounds: Normal breath sounds. No wheezing or rales.   Musculoskeletal:      Right lower leg: No edema.      Left lower leg: No edema.   Skin:     Coloration: Skin is not pale.   Neurological:      General: No focal deficit present.      Mental Status: She is alert and oriented to person, place, and time.      Cranial Nerves: No cranial nerve deficit.      Coordination: Coordination normal.      Gait: Gait is intact. Gait normal.   Psychiatric:         Mood and Affect: Mood and affect normal.         Behavior: Behavior normal.       DATA REVIEW:  Most Recent Lipid Panel:   Lab Results   Component Value Date/Time    CHOLSTRLTOT 154 08/13/2024 09:18 AM    LDL 78 08/13/2024 09:18 AM    HDL 63 08/13/2024 09:18 AM    TRIGLYCERIDE 64 08/13/2024 09:18 AM     Lab Results   Component Value Date/Time    LDL 78 08/13/2024 09:18 AM    LDL 93 03/06/2024 09:47 AM    LDL 91 10/04/2023 11:52 AM     (H) 04/06/2022 09:13 AM     (H) 03/22/2021 06:29 AM      Lab Results   Component Value Date/Time    LIPOPROTA 64 (H) 08/13/2024 09:18 AM      No results found for: \"APOB\"   No results found for: \"CRPHIGHSEN\"    Other Pertinent Blood Work:   Lab Results   Component Value Date    SODIUM 137 08/13/2024    POTASSIUM 3.9 08/13/2024    CHLORIDE 103 08/13/2024    CO2 22 08/13/2024    ANION 12.0 08/13/2024    GLUCOSE 86 08/13/2024    BUN " 17 08/13/2024    CREATININE 0.82 08/13/2024    CALCIUM 9.1 08/13/2024    ASTSGOT 26 08/13/2024    ALTSGPT 15 08/13/2024    ALKPHOSPHAT 98 08/13/2024    TBILIRUBIN 0.4 08/13/2024    ALBUMIN 4.1 08/13/2024    AGRATIO 2.0 08/13/2024    TSHULTRASEN 2.030 10/04/2023       VASCULAR IMAGING:    Coronary CTA nov 2023  CAC Score: 209  Mild plaque LAD and RCA          ASSESSMENT AND PLAN  1. Coronary artery disease involving native coronary artery of native heart without angina pectoris  aspirin (ASA) 81 MG Chew Tab chewable tablet    rosuvastatin (CRESTOR) 5 MG Tab    ezetimibe (ZETIA) 10 MG Tab    US-TOTAL VASCULAR SCREENING W/CIMT (S/P)    CBC WITHOUT DIFFERENTIAL      2. Mixed hyperlipidemia  aspirin (ASA) 81 MG Chew Tab chewable tablet    rosuvastatin (CRESTOR) 5 MG Tab    ezetimibe (ZETIA) 10 MG Tab    US-TOTAL VASCULAR SCREENING W/CIMT (S/P)    Lipid Profile    APOLIPOPROTEIN B    Lipoprotein (a)    Comp Metabolic Panel    CRP HIGH SENSITIVE (CARDIAC)      3. High serum lipoprotein(a)  aspirin (ASA) 81 MG Chew Tab chewable tablet    ezetimibe (ZETIA) 10 MG Tab      4. Hypertriglyceridemia  aspirin (ASA) 81 MG Chew Tab chewable tablet    rosuvastatin (CRESTOR) 5 MG Tab    ezetimibe (ZETIA) 10 MG Tab        Patient Type, check all that apply: High risk primary prevention    Major ASCVD events: None, but does have subclinical coronary artery disease    Established (non-atherosclerotic) Vascular Disease: None    Secondary causes/contributors: None    Evidence of genetic dyslipidemia: No   FH genotyping: NO    ACC/AHA Indication for Statin Therapy:  Established ASCVD: Indication for High intensity statin     ASCVD risk calculations  The 10-year ASCVD risk score (Aisha RODRIGUEZ, et al., 2019) is: 8.9%, but this is obviously an underestimation given her CAC score    Other Significant Risk Markers:  High-risk conditions: None   Risk-enhancers: Lp(a) >50mg/dL (>125 nmol/L)    Goal LDL-C and ApolipoproteinB/non-HDL-C:  LDL-C <70  mg/dl  apoB <70 mg/dl  At goals? no    LIFESTYLE INTERVENTIONS  TOBACCO:  reports that she has never smoked. She has never used smokeless tobacco.   - continued complete avoidance of all tobacco products   PHYSICAL ACTIVITY:  Continue excellent exercise habits with continued focus on resistance training in addition to aerobic exercise  NUTRITION: Maintain 30 pound weight loss.  Keep up her protein intake  ETOH: limit to 1 or less standard drinks/day  WT MGMT: maintain current healthy weight -reasonable to continue Ozempic at present    LIPID-LOWERING MEDICATION MANAGEMENT:     Statin Therapy:   Patient is been unable to tolerate multiple other statins in the past including atorvastatin simvastatin and pravastatin  She is able to tolerate rosuvastatin 5 mg 2 days a week, but has not been able to tolerate higher doses  -Continue rosuvastatin 5 mg 2 days a week -would consider this max tolerated statin dose    Non-Statin Meds:   -Continue fenofibrate  -Add ezetimibe 10 mg a day  -Consider addition of bempedoic acid depending upon results    PCSK9 Inhibitor strategy indications: Not currently indicated but given ASCVD would consider if LDL remains above target despite maximum tolerated oral therapy    Recommended Supplements: None     BLOOD PRESSURE MANAGEMENT:  ACC/AHA goal <130/80  Patient has been normotensive  -Continue to follow blood pressure in the office    GLYCEMIC STATUS: Normal  -Recheck fasting glucose    ANTITHROMBOTIC THERAPY -especially if she is going to continue on HRT I think the benefit of aspirin outweighs the risk.  Using shared decision making we decided to start with  -Aspirin 81 mg 3 days a week    OTHER    # GÓMEZ -continue oral appliance and CPAP.  Defer further management to PCP and pulmonary/sleep medicine    # HRT -discussed the risk of atherothrombosis in a patient at this age with established atherosclerosis.  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      Studies Ordered at Todays Visit: CIMT and vascular screen -follow serially  Blood Work To Be Obtained Prior to Next Visit: as noted above  Follow-Up: 8 weeks    Michael J Bloch, M.D.  [facility], Board-certified clinical lipidologist   [facility]   [facility]   [phone]      CC:  SARAH Rodriguez Jake H, M.D.

## 2025-03-05 ENCOUNTER — HOSPITAL ENCOUNTER (OUTPATIENT)
Dept: RADIOLOGY | Facility: MEDICAL CENTER | Age: 75
End: 2025-03-05
Attending: INTERNAL MEDICINE
Payer: COMMERCIAL

## 2025-03-05 DIAGNOSIS — I25.10 CORONARY ARTERY DISEASE INVOLVING NATIVE CORONARY ARTERY OF NATIVE HEART WITHOUT ANGINA PECTORIS: ICD-10-CM

## 2025-03-05 DIAGNOSIS — E78.2 MIXED HYPERLIPIDEMIA: ICD-10-CM

## 2025-03-05 PROCEDURE — 93998 UNLISTD NONINVAS VASC DX STD: CPT

## 2025-03-05 PROCEDURE — 306723 US-TOTAL VASCULAR SCREENING (S/P)

## 2025-03-06 ENCOUNTER — HOSPITAL ENCOUNTER (OUTPATIENT)
Dept: RADIOLOGY | Facility: MEDICAL CENTER | Age: 75
End: 2025-03-06
Attending: PHYSICAL MEDICINE & REHABILITATION
Payer: MEDICARE

## 2025-03-06 ENCOUNTER — APPOINTMENT (OUTPATIENT)
Dept: PHYSICAL MEDICINE AND REHAB | Facility: MEDICAL CENTER | Age: 75
End: 2025-03-06
Payer: MEDICARE

## 2025-03-06 ENCOUNTER — RESULTS FOLLOW-UP (OUTPATIENT)
Dept: CARDIOLOGY | Facility: MEDICAL CENTER | Age: 75
End: 2025-03-06

## 2025-03-06 VITALS
OXYGEN SATURATION: 95 % | HEIGHT: 62 IN | TEMPERATURE: 97.6 F | DIASTOLIC BLOOD PRESSURE: 64 MMHG | WEIGHT: 133.8 LBS | HEART RATE: 56 BPM | BODY MASS INDEX: 24.62 KG/M2 | SYSTOLIC BLOOD PRESSURE: 108 MMHG

## 2025-03-06 DIAGNOSIS — M54.2 CHRONIC NECK PAIN: ICD-10-CM

## 2025-03-06 DIAGNOSIS — M25.511 ACUTE PAIN OF RIGHT SHOULDER: ICD-10-CM

## 2025-03-06 DIAGNOSIS — S46.011A TRAUMATIC TEAR OF RIGHT ROTATOR CUFF, UNSPECIFIED TEAR EXTENT, INITIAL ENCOUNTER: ICD-10-CM

## 2025-03-06 DIAGNOSIS — M54.50 CHRONIC BILATERAL LOW BACK PAIN WITHOUT SCIATICA: ICD-10-CM

## 2025-03-06 DIAGNOSIS — G89.29 CHRONIC BILATERAL LOW BACK PAIN WITHOUT SCIATICA: ICD-10-CM

## 2025-03-06 DIAGNOSIS — G89.29 CHRONIC NECK PAIN: ICD-10-CM

## 2025-03-06 PROCEDURE — 73221 MRI JOINT UPR EXTREM W/O DYE: CPT | Mod: RT

## 2025-03-06 PROCEDURE — 76882 US LMTD JT/FCL EVL NVASC XTR: CPT | Performed by: PHYSICAL MEDICINE & REHABILITATION

## 2025-03-06 PROCEDURE — 3074F SYST BP LT 130 MM HG: CPT | Performed by: PHYSICAL MEDICINE & REHABILITATION

## 2025-03-06 PROCEDURE — 1125F AMNT PAIN NOTED PAIN PRSNT: CPT | Performed by: PHYSICAL MEDICINE & REHABILITATION

## 2025-03-06 PROCEDURE — 73030 X-RAY EXAM OF SHOULDER: CPT | Mod: RT

## 2025-03-06 PROCEDURE — 3078F DIAST BP <80 MM HG: CPT | Performed by: PHYSICAL MEDICINE & REHABILITATION

## 2025-03-06 PROCEDURE — 99214 OFFICE O/P EST MOD 30 MIN: CPT | Mod: 25 | Performed by: PHYSICAL MEDICINE & REHABILITATION

## 2025-03-06 RX ORDER — TRAMADOL HYDROCHLORIDE 50 MG/1
50 TABLET ORAL EVERY 6 HOURS PRN
Qty: 56 TABLET | Refills: 0 | Status: SHIPPED | OUTPATIENT
Start: 2025-03-06 | End: 2025-03-20

## 2025-03-06 ASSESSMENT — FIBROSIS 4 INDEX: FIB4 SCORE: 1.49

## 2025-03-06 ASSESSMENT — PATIENT HEALTH QUESTIONNAIRE - PHQ9: CLINICAL INTERPRETATION OF PHQ2 SCORE: 0

## 2025-03-06 ASSESSMENT — PAIN SCALES - GENERAL: PAINLEVEL_OUTOF10: 5=MODERATE PAIN

## 2025-03-06 NOTE — LETTER
March 6, 2025         Patient: Audrey Aquino   YOB: 1950   Date of Visit: 3/6/2025           To Whom it May Concern:    Audrey Aquino was seen in my clinic on 3/6/2025.   Advised not to ski due to Shoulder Injury until further notice.      If you have any questions or concerns, please don't hesitate to call.        Sincerely,           Ru Estrada M.D.  Electronically Signed

## 2025-03-06 NOTE — PROGRESS NOTES
Follow up patient note  Interventional spine and Pain  Physiatry (physical medicine and  Rehabilitation)       Chief complaint:   Chief Complaint   Patient presents with    Follow-Up     Shoulder pain          HISTORY    Please see new patient note by Dr Estrada,  for more details.     HPI  Patient identification: Audrey Aquino ,  1950,   With Diagnoses of Acute pain of right shoulder, Traumatic tear of right rotator cuff, unspecified tear extent, initial encounter, Chronic bilateral low back pain without sciatica, and Chronic neck pain were pertinent to this visit.       Verbal consent was obtained for Demarcus copilot : Yes      History of Present Illness  The patient is a 74-year-old female presenting for a follow-up visit. Her primary complaint today is acute on chronic right shoulder pain, localized to the anterior, lateral, and posterior aspects. The pain is rated at 6 out of 10 in intensity and is exacerbated by overhead movements or shoulder use. The pain is non-radiating. She reports significant improvement in her neck and low back pain, which are now stable and cause only mild discomfort. The neck and low back are not major concerns today; the primary issue is the right shoulder pain.    The current episode of shoulder pain began 8 days ago following a skiing incident in which her pole became lodged in ice, resulting in an audible pop in her right shoulder. This incident represents the fifth injury to the same shoulder. Although the pain was less severe this morning, she continues to experience limited functionality and is unable to apply pressure on the shoulder. She is seeking physical therapy or other necessary interventions. She has a history of multiple shoulder injuries due to previous rock climbing activities. Historically, she has not sought treatment for these injuries, opting instead to wait for spontaneous resolution. She reports difficulty in raising her arm and pronating it, with  pronation causing significant discomfort. Additionally, she experiences pain in the medial shoulder and deltoid area, which prevents her from sleeping on the affected side. She has been managing the pain with ice for the first 3 days and is currently using a heating pad, which provides some relief. She is unable to take NSAIDs due to gastrointestinal side effects. Acetaminophen has been effective in managing her pain, but she found tramadol, prescribed 8 years ago, to be ineffective. She has been using CBD products for pain management and as a sleep aid. She has not tried topical diclofenac gel. She is requesting a letter to excuse her from skiing until next year.    Supplemental Information  The patient underwent a vascular ultrasound yesterday.    ALLERGIES  The patient can not take NSAIDs due to stomach issues.    MEDICATIONS  - Current: Tylenol  - Current: CBD products  - Past: Tramadol           ROS Red Flags :   Fever, Chills, Sweats: Denies  Involuntary Weight Loss: Denies  Bowel/Bladder Incontinence: Denies  Saddle Anesthesia: Denies        PMHx:   Past Medical History:   Diagnosis Date    Asthma     cough equivalent    CAD (coronary artery disease)     Dry eyes     Frequent headaches     Gingivitis     History of hormone therapy     Hyperlipidemia     Knee pain     Overweight     Shoulder injury     Sleep disorder        PSHx:   Past Surgical History:   Procedure Laterality Date    EYE SURGERY      right cataract remobal/lens placement       Family history   Family History   Problem Relation Age of Onset    Cancer Mother         breast    Heart Attack Father     Heart Disease Father          Medications:   Outpatient Medications Marked as Taking for the 3/6/25 encounter (Office Visit) with Ru Estrada M.D.   Medication Sig Dispense Refill    diclofenac sodium (VOLTAREN) 1 % Gel Apply 3 g topically 4 times a day as needed (shoulder pain). 100 g 3    traMADol (ULTRAM) 50 MG Tab Take 1 Tablet by mouth  "every 6 hours as needed for Severe Pain for up to 14 days. 56 Tablet 0    aspirin (ASA) 81 MG Chew Tab chewable tablet Chew 1 Tablet every day. 3 days per week 100 Tablet 11    ezetimibe (ZETIA) 10 MG Tab Take 1 Tablet by mouth every day. 90 Tablet 3    OZEMPIC, 1 MG/DOSE, 4 MG/3ML Solution Pen-injector       fenofibrate (TRICOR) 145 MG Tab TAKE 1 TABLET DAILY 90 Tablet 3    Estradiol 0.025 MG/24HR PATCH BIWEEKLY APPLY 1 PATCH TWICE WEEKLY AS DIRECTED 24 Patch 3    budesonide-formoterol (SYMBICORT) 160-4.5 MCG/ACT Aerosol Inhale 2 Puffs 2 Times a Day. 1 Each 2    Multiple Vitamins-Minerals (DRY EYE FORMULA) Cap Take  by mouth.      CALCIUM-MAGNESIUM PO Take  by mouth.          Current Outpatient Medications on File Prior to Visit   Medication Sig Dispense Refill    aspirin (ASA) 81 MG Chew Tab chewable tablet Chew 1 Tablet every day. 3 days per week 100 Tablet 11    ezetimibe (ZETIA) 10 MG Tab Take 1 Tablet by mouth every day. 90 Tablet 3    OZEMPIC, 1 MG/DOSE, 4 MG/3ML Solution Pen-injector       fenofibrate (TRICOR) 145 MG Tab TAKE 1 TABLET DAILY 90 Tablet 3    Estradiol 0.025 MG/24HR PATCH BIWEEKLY APPLY 1 PATCH TWICE WEEKLY AS DIRECTED 24 Patch 3    budesonide-formoterol (SYMBICORT) 160-4.5 MCG/ACT Aerosol Inhale 2 Puffs 2 Times a Day. 1 Each 2    Multiple Vitamins-Minerals (DRY EYE FORMULA) Cap Take  by mouth.      CALCIUM-MAGNESIUM PO Take  by mouth.      rosuvastatin (CRESTOR) 5 MG Tab Take 1 Tablet by mouth every 48 hours. 45 Tablet 3    Coenzyme Q10 (COQ10) 100 MG Cap Take  by mouth.       No current facility-administered medications on file prior to visit.         Allergies:   Allergies   Allergen Reactions    Penicillins      Never taken but has family history of anaphylaxis death    Codeine      \"Makes me sick\"       Social Hx:   Social History     Socioeconomic History    Marital status:      Spouse name: Not on file    Number of children: Not on file    Years of education: Not on file    " Highest education level: Professional school degree (e.g., MD, JORDYS, DVM, FRANK)   Occupational History    Not on file   Tobacco Use    Smoking status: Never    Smokeless tobacco: Never   Vaping Use    Vaping status: Never Used   Substance and Sexual Activity    Alcohol use: Yes     Comment: 2-3 drinks a week     Drug use: Yes     Types: Marijuana     Comment: indica tablets for sleep    Sexual activity: Not Currently   Other Topics Concern     Service No    Blood Transfusions No    Caffeine Concern No    Occupational Exposure No    Hobby Hazards Yes    Sleep Concern Yes    Stress Concern No    Weight Concern Yes    Special Diet No    Back Care No    Exercise Yes    Bike Helmet No    Seat Belt Yes    Self-Exams Yes   Social History Narrative    Not on file     Social Drivers of Health     Financial Resource Strain: Low Risk  (4/26/2022)    Overall Financial Resource Strain (CARDIA)     Difficulty of Paying Living Expenses: Not hard at all   Food Insecurity: No Food Insecurity (4/26/2022)    Hunger Vital Sign     Worried About Running Out of Food in the Last Year: Never true     Ran Out of Food in the Last Year: Never true   Transportation Needs: No Transportation Needs (4/26/2022)    PRAPARE - Transportation     Lack of Transportation (Medical): No     Lack of Transportation (Non-Medical): No   Physical Activity: Sufficiently Active (4/26/2022)    Exercise Vital Sign     Days of Exercise per Week: 7 days     Minutes of Exercise per Session: 40 min   Stress: No Stress Concern Present (4/26/2022)    [facility] - Occupational Stress Questionnaire     Feeling of Stress : Not at all   Social Connections: Moderately Integrated (4/26/2022)    Social Connection and Isolation Panel [NHANES]     Frequency of Communication with Friends and Family: More than three times a week     Frequency of Social Gatherings with Friends and Family: More than three times a week     Attends [employer]  "Services: More than 4 times per year     Active Member of Clubs or Organizations: Yes     Attends Club or Organization Meetings: More than 4 times per year     Marital Status:    Intimate Partner Violence: Not on file   Housing Stability: Unknown (4/26/2022)    Housing Stability Vital Sign     Unable to Pay for Housing in the Last Year: No     Number of Places Lived in the Last Year: Not on file     Unstable Housing in the Last Year: No         EXAMINATION     Physical Exam:   Vitals: /64 (BP Location: Left arm, Patient Position: Sitting, BP Cuff Size: Large adult)   Pulse (!) 56   Temp 36.4 °C (97.6 °F) (Temporal)   Ht 1.562 m (5' 1.5\")   Wt 60.7 kg (133 lb 12.8 oz)   SpO2 95%     Constitutional:   Body Habitus: Body mass index is 24.87 kg/m².  Cooperation: Fully cooperates with exam  Appearance: Well-groomed no disheveled    Respiratory-  breathing comfortable on room air, no audible wheezing  Cardiovascular- capillary refills less than 2 seconds. No lower extremity edema is noted.   Psychiatric- alert and oriented ×3. Normal affect.    MSK and Neuro: -    Physical Exam  There is weakness with elbow flexion. Upper cut sign is positive. Yergason's is positive. Speed's is positive. Empty can maneuver is positive with weakness and pain. Resisted external rotation is positive for weakness and pain. There is decreased range of motion of the shoulder with active range of motion. Carrillo maneuver is positive.         MEDICAL DECISION MAKING    DATA    Labs:   Lab Results   Component Value Date/Time    SODIUM 137 08/13/2024 09:18 AM    POTASSIUM 3.9 08/13/2024 09:18 AM    CHLORIDE 103 08/13/2024 09:18 AM    CO2 22 08/13/2024 09:18 AM    GLUCOSE 86 08/13/2024 09:18 AM    BUN 17 08/13/2024 09:18 AM    CREATININE 0.82 08/13/2024 09:18 AM        No results found for: \"PROTHROMBTM\", \"INR\"     Lab Results   Component Value Date/Time    WBC 4.3 (L) 08/13/2024 09:18 AM    RBC 4.50 08/13/2024 09:18 AM    " HEMOGLOBIN 13.2 08/13/2024 09:18 AM    HEMATOCRIT 39.9 08/13/2024 09:18 AM    MCV 88.7 08/13/2024 09:18 AM    MCH 29.3 08/13/2024 09:18 AM    MCHC 33.1 08/13/2024 09:18 AM    MPV 10.0 08/13/2024 09:18 AM    NEUTSPOLYS 57.10 08/13/2024 09:18 AM    LYMPHOCYTES 31.70 08/13/2024 09:18 AM    MONOCYTES 8.10 08/13/2024 09:18 AM    EOSINOPHILS 1.90 08/13/2024 09:18 AM    BASOPHILS 0.70 08/13/2024 09:18 AM        Lab Results   Component Value Date/Time    HBA1C 5.5 10/04/2023 11:52 AM          Imaging:   I personally reviewed following images          Results  - Imaging (Limited diagnostic ultrasound of the right shoulder):    - Subscapularis tendon is poorly visualized, consistent with tear    - Distal fibers of the supraspinatus tendon are poorly visualized, consistent with tear    - Biceps at the bicipital groove is poorly visualized, consistent with tear    - Calcific tendinitis in the attachments of the expected area of the subscapularis tendon and the supraspinatus tendon       I reviewed the following radiology reports                     Results for orders placed during the hospital encounter of 06/06/19    MR-CERVICAL SPINE-W/O    Impression  1.  Multilevel degenerative disc disease, uncinate and facet degeneration. There are varying degrees of neural foraminal narrowing at levels as specifically described above. No significant central canal narrowing.    2.  Loss of the normal cervical lordosis.      Results for orders placed during the hospital encounter of 04/06/22    MR-LUMBAR SPINE-W/O    Impression  1.  Multilevel degenerative disc disease and facet degeneration. This results in moderate central canal narrowing at L3-4 and L4-5 and borderline central canal narrowing at L2-3.    2.  Varying degrees of neural foraminal narrowing and attenuation of lateral recesses as specifically described above.    3.  Mild anterior degenerative subluxation at L3-4 and L4-5.        Results for orders placed during the hospital  encounter of 22    MR-LUMBAR SPINE-W/O    Impression  1.  Multilevel degenerative disc disease and facet degeneration. This results in moderate central canal narrowing at L3-4 and L4-5 and borderline central canal narrowing at L2-3.    2.  Varying degrees of neural foraminal narrowing and attenuation of lateral recesses as specifically described above.    3.  Mild anterior degenerative subluxation at L3-4 and L4-5.                                                                                          Results for orders placed during the hospital encounter of 19    DX-CERVICAL SPINE-4+ VIEWS    Impression  1.  There is degenerative disease and arthropathy predominantly at the C5-6 and C6-7 levels.  2.  The alignment is normal. There is no abnormal motion.                     Results for orders placed during the hospital encounter of 19    DX-KNEE 3 VIEWS RIGHT    Impression  Minimal tricompartmental osteoarthrosis bilaterally. No erosions.   Results for orders placed during the hospital encounter of 19    DX-KNEES-AP BILATERAL STANDING    Impression  Minimal tricompartmental osteoarthrosis bilaterally. No erosions.    Results for orders placed during the hospital encounter of 22    DX-LUMBAR SPINE-2 OR 3 VIEWS    Impression  Grade 1 anterolisthesis of L3 on L4 and L4 on L5 with degenerative changes as above described, including facet arthropathy.                         DIAGNOSIS   Visit Diagnoses     ICD-10-CM   1. Acute pain of right shoulder  M25.511   2. Traumatic tear of right rotator cuff, unspecified tear extent, initial encounter  S46.011A   3. Chronic bilateral low back pain without sciatica  M54.50    G89.29   4. Chronic neck pain  M54.2    G89.29         ASSESSMENT and PLAN:     Audrey Prasad Kenia  1950 female      Audrey was seen today for follow-up.    Diagnoses and all orders for this visit:    Acute pain of right shoulder  -     DX-SHOULDER 2+ RIGHT; Future  -      MR-SHOULDER-W/O RIGHT; Future  -     diclofenac sodium (VOLTAREN) 1 % Gel; Apply 3 g topically 4 times a day as needed (shoulder pain).  -     traMADol (ULTRAM) 50 MG Tab; Take 1 Tablet by mouth every 6 hours as needed for Severe Pain for up to 14 days.  -     Referral to Orthopedics  -     Consent for Opiate Prescription    Traumatic tear of right rotator cuff, unspecified tear extent, initial encounter  -     DX-SHOULDER 2+ RIGHT; Future  -     MR-SHOULDER-W/O RIGHT; Future  -     diclofenac sodium (VOLTAREN) 1 % Gel; Apply 3 g topically 4 times a day as needed (shoulder pain).  -     traMADol (ULTRAM) 50 MG Tab; Take 1 Tablet by mouth every 6 hours as needed for Severe Pain for up to 14 days.  -     Referral to Orthopedics  -     Consent for Opiate Prescription    Chronic bilateral low back pain without sciatica    Chronic neck pain        Assessment & Plan  The patient's symptoms suggest a potential rotator cuff tear, possibly involving the supraspinatus tendon and the subscapularis tendon. There is also evidence of calcific tendinitis in the attachments of the expected area of the subscapularis tendon and the supraspinatus tendon. She has a long-term issue with the biceps tendon, which may be older. The subscapularis tendon is poorly visualized, which can be consistent with a tear. The distal fibers of the supraspinatus tendon are also poorly visualized, consistent with a tear. The biceps at the bicipital groove is poorly visualized, indicating a possible tear. She is advised to avoid any activities that exacerbate her pain and to trust her body's signals. An MRI of the right shoulder will be ordered to further evaluate the extent of the injury. A prescription for tramadol has been provided, to be taken up to 4 times daily for a duration of 2 weeks. She is also advised to apply topical diclofenac gel for pain management. A referral to orthopedics has been made to expedite her care. If the MRI confirms a full  tear, surgical intervention may be considered within a few months to prevent muscle atrophy and ensure she remains a candidate for surgery in the future.  The patient will follow up with orthopedics after the above imaging.  I also discussed the case with orthopedics who agreed to see the patient urgently.    PROCEDURE  A limited diagnostic ultrasound of the right shoulder was performed in the clinic today, 03/06/2025. The subscapularis tendon is poorly visualized, which can be consistent with tear. The distal fibers of the supraspinatus tendon are poorly visualized, which are consistent with tear. The biceps at the bicipital groove is poorly visualized, which is consistent with tear.        Follow up: After the above consultation with orthopedics as needed    Thank you for allowing me to participate in the care of this patient. If you have any questions please not hesitate to contact me.             Please note that this dictation was created using voice recognition software. I have made every reasonable attempt to correct obvious errors but there may be errors of grammar and content that I may have overlooked prior to finalization of this note.      Ru Estrada MD  Interventional Spine and Sports Physiatry  Physical Medicine and Rehabilitation  [facility]       Addendum:  I reviewed the MRI of the right shoulder from today 3/6/2025       IMPRESSION:     1.  Full-thickness tear of the supraspinatus tendon anteriorly measuring 16 mm in AP dimension with 3 cm of retraction. High-grade partial-thickness tear involves the remainder of the articular surface fibers.     2.  Full-thickness tear of the subscapularis tendon with the majority of the fibers retracted to the level of the bony glenoid. There is mild muscle atrophy and edema.     3.  Tear of the supraglenoid labrum and biceps anchor. Long head of the biceps tendon is not identified within or above the bicipital groove consistent with tear and  retraction.     4.  Mild degenerative change of the glenohumeral joint.     5.  Small inferiorly directed subacromial enthesophyte.     6.  Degenerative change of the acromioclavicular joint. Joint effusion with synovitis.    The patient requested surgical opinions from 2 orthopedic surgeons from Dr. Richards as well as from Dr. Weeks.  I placed these orders.

## 2025-03-10 ENCOUNTER — APPOINTMENT (OUTPATIENT)
Dept: ADMISSIONS | Facility: MEDICAL CENTER | Age: 75
End: 2025-03-10
Attending: STUDENT IN AN ORGANIZED HEALTH CARE EDUCATION/TRAINING PROGRAM
Payer: MEDICARE

## 2025-03-11 ENCOUNTER — PRE-ADMISSION TESTING (OUTPATIENT)
Dept: ADMISSIONS | Facility: MEDICAL CENTER | Age: 75
End: 2025-03-11
Attending: STUDENT IN AN ORGANIZED HEALTH CARE EDUCATION/TRAINING PROGRAM
Payer: MEDICARE

## 2025-03-11 ENCOUNTER — APPOINTMENT (OUTPATIENT)
Dept: ADMISSIONS | Facility: MEDICAL CENTER | Age: 75
End: 2025-03-11
Payer: MEDICARE

## 2025-03-11 VITALS — BODY MASS INDEX: 24.47 KG/M2 | HEIGHT: 62 IN

## 2025-03-11 NOTE — Clinical Note
REFERRAL APPROVAL NOTICE         Sent on March 11, 2025                   Audrey Aquino  [address] [facility] [address]                   Dear Ms. Aquino,    After a careful review of the medical information and benefit coverage, [facility] has processed your referral. See below for additional details.    If applicable, you must be actively enrolled with your insurance for coverage of the authorized service. If you have any questions regarding your coverage, please contact your insurance directly.    REFERRAL INFORMATION   Referral #:  [id number]  Referred-To Provider    Referred-By Provider:  Orthopedic Surgery    Ru Estrada M.D.   [facility]      [address]  [address]  [facility] [address]  [phone] [address]  [address]  [facility] [address]  [phone]    Referral Start Date:  03/06/2025  Referral End Date:   03/06/2026             SCHEDULING  If you do not already have an appointment, please call [phone] to make an appointment.     MORE INFORMATION  If you do not already have a [employer] account, sign up at: [email or web address]  You can access your medical information, make appointments, see lab results, billing information, and more.  If you have questions regarding this referral, please contact  the [facility] Referrals department at:             [phone]. Monday - Friday 8:00AM - 5:00PM.     Sincerely,    [facility]

## 2025-03-11 NOTE — Clinical Note
REFERRAL APPROVAL NOTICE         Sent on March 11, 2025                   Audrey Aquino  [address] [facility] [address]                   Dear Ms. Aquino,    After a careful review of the medical information and benefit coverage, [facility] has processed your referral. See below for additional details.    If applicable, you must be actively enrolled with your insurance for coverage of the authorized service. If you have any questions regarding your coverage, please contact your insurance directly.    REFERRAL INFORMATION   Referral #:  [id number]  Referred-To Provider    Referred-By Provider:  Orthopedic Surgery    Ru Estrada M.D.   [facility]      [address]  [address]  [facility] [address]  [phone] [address]  [address]  [facility] [address]  [phone]    Referral Start Date:  03/06/2025  Referral End Date:   03/06/2026             SCHEDULING  If you do not already have an appointment, please call [phone] to make an appointment.     MORE INFORMATION  If you do not already have a [employer] account, sign up at: [email or web address]  You can access your medical information, make appointments, see lab results, billing information, and more.  If you have questions regarding this referral, please contact  the [facility] Referrals department at:             [phone]. Monday - Friday 8:00AM - 5:00PM.     Sincerely,    [facility]

## 2025-03-11 NOTE — PREADMIT AVS NOTE
Current Medications   Medication Instructions    diclofenac sodium (VOLTAREN) 1 % Gel Stop 5 days before surgery    traMADol (ULTRAM) 50 MG Tab Continue taking medication as prescribed, including morning of procedure     aspirin (ASA) 81 MG Chew Tab chewable tablet Stop 7 days before surgery    ezetimibe (ZETIA) 10 MG Tab Stop 24 hours before surgery    OZEMPIC, 1 MG/DOSE, 4 MG/3ML Solution Pen-injector Stop 7 days before surgery    fenofibrate (TRICOR) 145 MG Tab Stop 24 hours before surgery    Estradiol 0.025 MG/24HR PATCH BIWEEKLY Hold medication day of procedure    budesonide-formoterol (SYMBICORT) 160-4.5 MCG/ACT Aerosol Continue taking medication as prescribed, including morning of procedure     Multiple Vitamins-Minerals (DRY EYE FORMULA) Cap Stop 7 days before surgery    CALCIUM-MAGNESIUM PO Stop 7 days before surgery

## 2025-03-11 NOTE — PREPROCEDURE INSTRUCTIONS
PreAdmit Telephone Appointment: Reviewed the Preparing for your procedure handout with patient over the phone. Patient instructed per pharmacy guidelines regarding taking, holding or contacting provider for instructions on regularly prescribed medications before surgery.   Confirmed with patient where to check in morning of surgery. Handouts/Brochure/Video emailed to patient.

## 2025-03-12 ENCOUNTER — ANESTHESIA (OUTPATIENT)
Dept: SURGERY | Facility: MEDICAL CENTER | Age: 75
End: 2025-03-12
Payer: MEDICARE

## 2025-03-12 ENCOUNTER — PHARMACY VISIT (OUTPATIENT)
Dept: PHARMACY | Facility: MEDICAL CENTER | Age: 75
End: 2025-03-12
Payer: COMMERCIAL

## 2025-03-12 ENCOUNTER — HOSPITAL ENCOUNTER (OUTPATIENT)
Facility: MEDICAL CENTER | Age: 75
End: 2025-03-12
Attending: STUDENT IN AN ORGANIZED HEALTH CARE EDUCATION/TRAINING PROGRAM | Admitting: STUDENT IN AN ORGANIZED HEALTH CARE EDUCATION/TRAINING PROGRAM
Payer: MEDICARE

## 2025-03-12 ENCOUNTER — ANESTHESIA EVENT (OUTPATIENT)
Dept: SURGERY | Facility: MEDICAL CENTER | Age: 75
End: 2025-03-12
Payer: MEDICARE

## 2025-03-12 VITALS
SYSTOLIC BLOOD PRESSURE: 111 MMHG | RESPIRATION RATE: 15 BRPM | OXYGEN SATURATION: 94 % | HEART RATE: 90 BPM | BODY MASS INDEX: 24.72 KG/M2 | HEIGHT: 61 IN | WEIGHT: 130.95 LBS | DIASTOLIC BLOOD PRESSURE: 67 MMHG | TEMPERATURE: 97.2 F

## 2025-03-12 LAB — EKG IMPRESSION: NORMAL

## 2025-03-12 PROCEDURE — 93005 ELECTROCARDIOGRAM TRACING: CPT | Mod: TC | Performed by: STUDENT IN AN ORGANIZED HEALTH CARE EDUCATION/TRAINING PROGRAM

## 2025-03-12 PROCEDURE — 700111 HCHG RX REV CODE 636 W/ 250 OVERRIDE (IP): Mod: JZ | Performed by: STUDENT IN AN ORGANIZED HEALTH CARE EDUCATION/TRAINING PROGRAM

## 2025-03-12 PROCEDURE — 700105 HCHG RX REV CODE 258: Performed by: STUDENT IN AN ORGANIZED HEALTH CARE EDUCATION/TRAINING PROGRAM

## 2025-03-12 PROCEDURE — 160048 HCHG OR STATISTICAL LEVEL 1-5: Performed by: STUDENT IN AN ORGANIZED HEALTH CARE EDUCATION/TRAINING PROGRAM

## 2025-03-12 PROCEDURE — 700101 HCHG RX REV CODE 250: Performed by: STUDENT IN AN ORGANIZED HEALTH CARE EDUCATION/TRAINING PROGRAM

## 2025-03-12 PROCEDURE — C1713 ANCHOR/SCREW BN/BN,TIS/BN: HCPCS | Performed by: STUDENT IN AN ORGANIZED HEALTH CARE EDUCATION/TRAINING PROGRAM

## 2025-03-12 PROCEDURE — 160015 HCHG STAT PREOP MINUTES: Performed by: STUDENT IN AN ORGANIZED HEALTH CARE EDUCATION/TRAINING PROGRAM

## 2025-03-12 PROCEDURE — 160029 HCHG SURGERY MINUTES - 1ST 30 MINS LEVEL 4: Performed by: STUDENT IN AN ORGANIZED HEALTH CARE EDUCATION/TRAINING PROGRAM

## 2025-03-12 PROCEDURE — 160009 HCHG ANES TIME/MIN: Performed by: STUDENT IN AN ORGANIZED HEALTH CARE EDUCATION/TRAINING PROGRAM

## 2025-03-12 PROCEDURE — 64415 NJX AA&/STRD BRCH PLXS IMG: CPT | Performed by: STUDENT IN AN ORGANIZED HEALTH CARE EDUCATION/TRAINING PROGRAM

## 2025-03-12 PROCEDURE — 700111 HCHG RX REV CODE 636 W/ 250 OVERRIDE (IP): Performed by: STUDENT IN AN ORGANIZED HEALTH CARE EDUCATION/TRAINING PROGRAM

## 2025-03-12 PROCEDURE — 160002 HCHG RECOVERY MINUTES (STAT): Performed by: STUDENT IN AN ORGANIZED HEALTH CARE EDUCATION/TRAINING PROGRAM

## 2025-03-12 PROCEDURE — 160041 HCHG SURGERY MINUTES - EA ADDL 1 MIN LEVEL 4: Performed by: STUDENT IN AN ORGANIZED HEALTH CARE EDUCATION/TRAINING PROGRAM

## 2025-03-12 PROCEDURE — 160046 HCHG PACU - 1ST 60 MINS PHASE II: Performed by: STUDENT IN AN ORGANIZED HEALTH CARE EDUCATION/TRAINING PROGRAM

## 2025-03-12 PROCEDURE — 160035 HCHG PACU - 1ST 60 MINS PHASE I: Performed by: STUDENT IN AN ORGANIZED HEALTH CARE EDUCATION/TRAINING PROGRAM

## 2025-03-12 PROCEDURE — 160025 RECOVERY II MINUTES (STATS): Performed by: STUDENT IN AN ORGANIZED HEALTH CARE EDUCATION/TRAINING PROGRAM

## 2025-03-12 PROCEDURE — 93010 ELECTROCARDIOGRAM REPORT: CPT | Performed by: INTERNAL MEDICINE

## 2025-03-12 PROCEDURE — RXMED WILLOW AMBULATORY MEDICATION CHARGE: Performed by: STUDENT IN AN ORGANIZED HEALTH CARE EDUCATION/TRAINING PROGRAM

## 2025-03-12 DEVICE — ANCHOR SOFT 2.6MM FIBERTAK WITH 1.7MM FIBERTAPE LOOP BLUE (1EA): Type: IMPLANTABLE DEVICE | Site: SHOULDER | Status: FUNCTIONAL

## 2025-03-12 DEVICE — ANCHOR SOFT 2.6MM FIBERTAK WITH 1.7MM FIBERTAPE LOOP BLACK/BLUE (1EA): Type: IMPLANTABLE DEVICE | Site: SHOULDER | Status: FUNCTIONAL

## 2025-03-12 DEVICE — ANCHOR 4.75MM BC KNOTLESS SWIVELOCK DOUBLE LOADED(1EA): Type: IMPLANTABLE DEVICE | Site: SHOULDER | Status: FUNCTIONAL

## 2025-03-12 RX ORDER — CEFAZOLIN SODIUM 1 G/3ML
INJECTION, POWDER, FOR SOLUTION INTRAMUSCULAR; INTRAVENOUS PRN
Status: DISCONTINUED | OUTPATIENT
Start: 2025-03-12 | End: 2025-03-12 | Stop reason: SURG

## 2025-03-12 RX ORDER — ONDANSETRON 2 MG/ML
4 INJECTION INTRAMUSCULAR; INTRAVENOUS
Status: DISCONTINUED | OUTPATIENT
Start: 2025-03-12 | End: 2025-03-13 | Stop reason: HOSPADM

## 2025-03-12 RX ORDER — HALOPERIDOL 5 MG/ML
1 INJECTION INTRAMUSCULAR
Status: DISCONTINUED | OUTPATIENT
Start: 2025-03-12 | End: 2025-03-13 | Stop reason: HOSPADM

## 2025-03-12 RX ORDER — DIPHENHYDRAMINE HYDROCHLORIDE 50 MG/ML
12.5 INJECTION, SOLUTION INTRAMUSCULAR; INTRAVENOUS
Status: DISCONTINUED | OUTPATIENT
Start: 2025-03-12 | End: 2025-03-13 | Stop reason: HOSPADM

## 2025-03-12 RX ORDER — EPHEDRINE SULFATE 50 MG/ML
INJECTION, SOLUTION INTRAVENOUS PRN
Status: DISCONTINUED | OUTPATIENT
Start: 2025-03-12 | End: 2025-03-12 | Stop reason: SURG

## 2025-03-12 RX ORDER — BUPIVACAINE HYDROCHLORIDE 2.5 MG/ML
INJECTION, SOLUTION EPIDURAL; INFILTRATION; INTRACAUDAL; PERINEURAL
Status: COMPLETED | OUTPATIENT
Start: 2025-03-12 | End: 2025-03-12

## 2025-03-12 RX ORDER — DEXAMETHASONE SODIUM PHOSPHATE 4 MG/ML
INJECTION, SOLUTION INTRA-ARTICULAR; INTRALESIONAL; INTRAMUSCULAR; INTRAVENOUS; SOFT TISSUE PRN
Status: DISCONTINUED | OUTPATIENT
Start: 2025-03-12 | End: 2025-03-12 | Stop reason: SURG

## 2025-03-12 RX ORDER — EPINEPHRINE 1 MG/ML(1)
AMPUL (ML) INJECTION
Status: DISCONTINUED | OUTPATIENT
Start: 2025-03-12 | End: 2025-03-12 | Stop reason: HOSPADM

## 2025-03-12 RX ORDER — EPINEPHRINE 1 MG/ML(1)
AMPUL (ML) INJECTION PRN
Status: DISCONTINUED | OUTPATIENT
Start: 2025-03-12 | End: 2025-03-12 | Stop reason: SURG

## 2025-03-12 RX ORDER — SODIUM CHLORIDE, SODIUM LACTATE, POTASSIUM CHLORIDE, CALCIUM CHLORIDE 600; 310; 30; 20 MG/100ML; MG/100ML; MG/100ML; MG/100ML
INJECTION, SOLUTION INTRAVENOUS CONTINUOUS
Status: ACTIVE | OUTPATIENT
Start: 2025-03-12 | End: 2025-03-12

## 2025-03-12 RX ORDER — KETAMINE HYDROCHLORIDE 50 MG/ML
INJECTION, SOLUTION, CONCENTRATE INTRAMUSCULAR; INTRAVENOUS PRN
Status: DISCONTINUED | OUTPATIENT
Start: 2025-03-12 | End: 2025-03-12 | Stop reason: SURG

## 2025-03-12 RX ORDER — HYDROMORPHONE HYDROCHLORIDE 1 MG/ML
0.1 INJECTION, SOLUTION INTRAMUSCULAR; INTRAVENOUS; SUBCUTANEOUS
Status: DISCONTINUED | OUTPATIENT
Start: 2025-03-12 | End: 2025-03-13 | Stop reason: HOSPADM

## 2025-03-12 RX ORDER — GABAPENTIN 100 MG/1
CAPSULE ORAL
Qty: 12 CAPSULE | Refills: 0 | OUTPATIENT
Start: 2025-03-12

## 2025-03-12 RX ORDER — ALBUTEROL SULFATE 5 MG/ML
2.5 SOLUTION RESPIRATORY (INHALATION)
Status: DISCONTINUED | OUTPATIENT
Start: 2025-03-12 | End: 2025-03-13 | Stop reason: HOSPADM

## 2025-03-12 RX ORDER — HYDROMORPHONE HYDROCHLORIDE 1 MG/ML
0.2 INJECTION, SOLUTION INTRAMUSCULAR; INTRAVENOUS; SUBCUTANEOUS
Status: DISCONTINUED | OUTPATIENT
Start: 2025-03-12 | End: 2025-03-13 | Stop reason: HOSPADM

## 2025-03-12 RX ORDER — HYDROMORPHONE HYDROCHLORIDE 1 MG/ML
0.4 INJECTION, SOLUTION INTRAMUSCULAR; INTRAVENOUS; SUBCUTANEOUS
Status: DISCONTINUED | OUTPATIENT
Start: 2025-03-12 | End: 2025-03-13 | Stop reason: HOSPADM

## 2025-03-12 RX ORDER — TRANEXAMIC ACID 100 MG/ML
INJECTION, SOLUTION INTRAVENOUS PRN
Status: DISCONTINUED | OUTPATIENT
Start: 2025-03-12 | End: 2025-03-12 | Stop reason: SURG

## 2025-03-12 RX ORDER — OXYCODONE HCL 5 MG/5 ML
5 SOLUTION, ORAL ORAL
Status: DISCONTINUED | OUTPATIENT
Start: 2025-03-12 | End: 2025-03-13 | Stop reason: HOSPADM

## 2025-03-12 RX ORDER — LABETALOL HYDROCHLORIDE 5 MG/ML
5 INJECTION, SOLUTION INTRAVENOUS
Status: DISCONTINUED | OUTPATIENT
Start: 2025-03-12 | End: 2025-03-13 | Stop reason: HOSPADM

## 2025-03-12 RX ORDER — PHENYLEPHRINE HYDROCHLORIDE 10 MG/ML
INJECTION, SOLUTION INTRAMUSCULAR; INTRAVENOUS; SUBCUTANEOUS PRN
Status: DISCONTINUED | OUTPATIENT
Start: 2025-03-12 | End: 2025-03-12 | Stop reason: SURG

## 2025-03-12 RX ORDER — HYDRALAZINE HYDROCHLORIDE 20 MG/ML
5 INJECTION INTRAMUSCULAR; INTRAVENOUS
Status: DISCONTINUED | OUTPATIENT
Start: 2025-03-12 | End: 2025-03-13 | Stop reason: HOSPADM

## 2025-03-12 RX ORDER — EPHEDRINE SULFATE 50 MG/ML
5 INJECTION, SOLUTION INTRAVENOUS
Status: DISCONTINUED | OUTPATIENT
Start: 2025-03-12 | End: 2025-03-13 | Stop reason: HOSPADM

## 2025-03-12 RX ORDER — SODIUM CHLORIDE 9 MG/ML
INJECTION, SOLUTION INTRAVENOUS ONCE
Status: DISCONTINUED | OUTPATIENT
Start: 2025-03-12 | End: 2025-03-12

## 2025-03-12 RX ORDER — HYDROMORPHONE HYDROCHLORIDE 2 MG/ML
INJECTION, SOLUTION INTRAMUSCULAR; INTRAVENOUS; SUBCUTANEOUS PRN
Status: DISCONTINUED | OUTPATIENT
Start: 2025-03-12 | End: 2025-03-12 | Stop reason: SURG

## 2025-03-12 RX ORDER — ONDANSETRON 8 MG/1
TABLET, ORALLY DISINTEGRATING ORAL
Qty: 10 TABLET | Refills: 0 | OUTPATIENT
Start: 2025-03-12

## 2025-03-12 RX ORDER — OXYCODONE HYDROCHLORIDE 5 MG/1
TABLET ORAL
Qty: 27 TABLET | Refills: 0 | OUTPATIENT
Start: 2025-03-12

## 2025-03-12 RX ORDER — ONDANSETRON 2 MG/ML
INJECTION INTRAMUSCULAR; INTRAVENOUS PRN
Status: DISCONTINUED | OUTPATIENT
Start: 2025-03-12 | End: 2025-03-12 | Stop reason: SURG

## 2025-03-12 RX ORDER — ATROPINE SULFATE 0.4 MG/ML
INJECTION, SOLUTION INTRAVENOUS PRN
Status: DISCONTINUED | OUTPATIENT
Start: 2025-03-12 | End: 2025-03-12 | Stop reason: SURG

## 2025-03-12 RX ORDER — BISACODYL 5 MG
TABLET, DELAYED RELEASE (ENTERIC COATED) ORAL
Qty: 4 TABLET | Refills: 0 | OUTPATIENT
Start: 2025-03-12

## 2025-03-12 RX ORDER — OXYCODONE HCL 5 MG/5 ML
10 SOLUTION, ORAL ORAL
Status: DISCONTINUED | OUTPATIENT
Start: 2025-03-12 | End: 2025-03-13 | Stop reason: HOSPADM

## 2025-03-12 RX ADMIN — PROPOFOL 150 MG: 10 INJECTION, EMULSION INTRAVENOUS at 15:29

## 2025-03-12 RX ADMIN — TRANEXAMIC ACID 1000 MG: 100 INJECTION, SOLUTION INTRAVENOUS at 15:35

## 2025-03-12 RX ADMIN — KETAMINE HYDROCHLORIDE 25 MG: 50 INJECTION INTRAMUSCULAR; INTRAVENOUS at 16:10

## 2025-03-12 RX ADMIN — EPHEDRINE SULFATE 10 MG: 50 INJECTION, SOLUTION INTRAVENOUS at 15:46

## 2025-03-12 RX ADMIN — GLYCOPYRROLATE 0.2 MG: 0.2 INJECTION INTRAMUSCULAR; INTRAVENOUS at 15:46

## 2025-03-12 RX ADMIN — PHENYLEPHRINE HYDROCHLORIDE 100 MCG: 10 INJECTION INTRAVENOUS at 16:54

## 2025-03-12 RX ADMIN — Medication 90 MG: at 15:28

## 2025-03-12 RX ADMIN — KETAMINE HYDROCHLORIDE 25 MG: 50 INJECTION INTRAMUSCULAR; INTRAVENOUS at 16:18

## 2025-03-12 RX ADMIN — FENTANYL CITRATE 50 MCG: 50 INJECTION, SOLUTION INTRAMUSCULAR; INTRAVENOUS at 15:38

## 2025-03-12 RX ADMIN — DEXAMETHASONE SODIUM PHOSPHATE 4 MG: 4 INJECTION INTRA-ARTICULAR; INTRALESIONAL; INTRAMUSCULAR; INTRAVENOUS; SOFT TISSUE at 15:47

## 2025-03-12 RX ADMIN — EPINEPHRINE 10 MCG: 1 INJECTION, SOLUTION INTRAMUSCULAR; SUBCUTANEOUS at 15:51

## 2025-03-12 RX ADMIN — SUGAMMADEX 200 MG: 100 INJECTION, SOLUTION INTRAVENOUS at 18:08

## 2025-03-12 RX ADMIN — HYDROMORPHONE HYDROCHLORIDE 0.4 MG: 2 INJECTION INTRAMUSCULAR; INTRAVENOUS; SUBCUTANEOUS at 16:47

## 2025-03-12 RX ADMIN — BUPIVACAINE HYDROCHLORIDE 10 ML: 2.5 INJECTION, SOLUTION EPIDURAL; INFILTRATION; INTRACAUDAL at 15:15

## 2025-03-12 RX ADMIN — FENTANYL CITRATE 50 MCG: 50 INJECTION, SOLUTION INTRAMUSCULAR; INTRAVENOUS at 15:27

## 2025-03-12 RX ADMIN — PHENYLEPHRINE HYDROCHLORIDE 50 MCG/MIN: 10 INJECTION INTRAVENOUS at 15:43

## 2025-03-12 RX ADMIN — ROCURONIUM BROMIDE 20 MG: 10 INJECTION, SOLUTION INTRAVENOUS at 17:30

## 2025-03-12 RX ADMIN — CEFAZOLIN 2 G: 1 INJECTION, POWDER, FOR SOLUTION INTRAMUSCULAR; INTRAVENOUS at 15:30

## 2025-03-12 RX ADMIN — ROCURONIUM BROMIDE 20 MG: 10 INJECTION, SOLUTION INTRAVENOUS at 16:42

## 2025-03-12 RX ADMIN — ONDANSETRON 4 MG: 2 INJECTION INTRAMUSCULAR; INTRAVENOUS at 17:43

## 2025-03-12 RX ADMIN — SODIUM CHLORIDE, POTASSIUM CHLORIDE, SODIUM LACTATE AND CALCIUM CHLORIDE: 600; 310; 30; 20 INJECTION, SOLUTION INTRAVENOUS at 15:23

## 2025-03-12 RX ADMIN — HYDROMORPHONE HYDROCHLORIDE 0.4 MG: 2 INJECTION INTRAMUSCULAR; INTRAVENOUS; SUBCUTANEOUS at 16:37

## 2025-03-12 RX ADMIN — ATROPINE SULFATE 0.4 MG: 0.4 INJECTION, SOLUTION INTRAVENOUS at 15:50

## 2025-03-12 RX ADMIN — ROCURONIUM BROMIDE 20 MG: 10 INJECTION, SOLUTION INTRAVENOUS at 15:43

## 2025-03-12 ASSESSMENT — PAIN DESCRIPTION - PAIN TYPE
TYPE: SURGICAL PAIN
TYPE: ACUTE PAIN
TYPE: SURGICAL PAIN
TYPE: SURGICAL PAIN

## 2025-03-12 ASSESSMENT — FIBROSIS 4 INDEX: FIB4 SCORE: 1.49

## 2025-03-12 NOTE — ANESTHESIA PROCEDURE NOTES
Peripheral Block    Date/Time: 3/12/2025 3:15 PM    Performed by: Cory Alford M.D.  Authorized by: Cory Alford M.D.    Start Time:  3/12/2025 3:15 PM  End Time:  3/12/2025 3:18 PM  Reason for Block: at surgeon's request and post-op pain management ONLY    patient identified, IV checked, site marked, risks and benefits discussed, surgical consent, monitors and equipment checked, pre-op evaluation and timeout performed    Patient Position:  Supine  Prep: ChloraPrep    Monitoring:  Heart rate, continuous pulse ox and cardiac monitor  Block Region:  Upper Extremity  Upper Extremity - Block Type:  BRACHIAL PLEXUS block, Interscalene approach    Laterality:  Right  Procedures: ultrasound guided  Image captured, interpreted and electronically stored.  Local Infiltration:  Lidocaine  Strength:  1 %  Dose:  3 ml  Block Type:  Single-shot  Needle Length:  50mm  Needle Gauge:  22 G  Needle Localization:  Ultrasound guidance  Ultrasound picture in chart  Injection Assessment:  Negative aspiration for heme, no paresthesia on injection, incremental injection and local visualized surrounding nerve on ultrasound

## 2025-03-12 NOTE — ANESTHESIA PROCEDURE NOTES
Airway    Date/Time: 3/12/2025 3:28 PM    Performed by: Cory Alford M.D.  Authorized by: Cory Alford M.D.    Location:  OR  Urgency:  Elective  Indications for Airway Management:  Anesthesia      Spontaneous Ventilation: absent    Sedation Level:  Deep  Preoxygenated: Yes    Patient Position:  Sniffing  Mask Difficulty Assessment:  0 - not attempted  Final Airway Type:  Endotracheal airway  Final Endotracheal Airway:  ETT  Cuffed: Yes    Technique Used for Successful ETT Placement:  Direct laryngoscopy    Insertion Site:  Oral  Blade Type:  Glide  Laryngoscope Blade/Videolaryngoscope Blade Size:  3  ETT Size (mm):  7.0  Measured from:  Teeth  ETT to Teeth (cm):  19  Placement Verified by: auscultation and capnometry    Cormack-Lehane Classification:  Grade I - full view of glottis  Number of Attempts at Approach:  1

## 2025-03-12 NOTE — ANESTHESIA PREPROCEDURE EVALUATION
Case: [id number] Date/Time: 03/12/25 1440    Procedure: RIGHT SHOULDER ARTHROSCOPY, OPEN ROTATOR CUFF REPAIR, SUBACROMIAL DECOMPRESSION WITH POSSIBLE BALLOON SPACER IMPLANT    Pre-op diagnosis: DISORDER OF ROTATOR CUFF    Location: [facility] OR 03 / SURGERY [facility]    Surgeons: Rock Richards M.D.            Relevant Problems   ANESTHESIA   (positive) Obstructive sleep apnea syndrome      PULMONARY   (positive) Cough variant asthma      CARDIAC   (positive) Coronary artery disease involving native coronary artery without angina pectoris       Physical Exam    Airway   Mallampati: II  TM distance: >3 FB  Neck ROM: full       Cardiovascular - normal exam  Rhythm: regular  Rate: normal  (-) murmur     Dental - normal exam           Pulmonary - normal exam  Breath sounds clear to auscultation     Abdominal    Neurological - normal exam                   Anesthesia Plan    ASA 3   ASA physical status 3 criteria: CAD (>3 months)    Plan - general and peripheral nerve block     Peripheral nerve block will be post-op pain control  Airway plan will be ETT    (History of subclinical CAD discovered when patient was having elective screening 2/2 family history. On ozempic, last dose Friday -- has been on stable dose for approx 1 month and does not get early satiety, currently very hungry. Patient declined preoperative nerve block, but after thorough discussion of R/B, consented to rescue block if she wants one in recovery. Pt acknowledged that that due to staffing availability, there may not be someone available to do the nerve block should she request one postoperatively.    -Update: After discussion with surgeon, patient has elected for a preoperative interscalene nerve block.)      Induction: intravenous and rapid sequence    Postoperative Plan: Postoperative administration of opioids is intended.    Pertinent diagnostic labs and testing reviewed    Informed Consent:    Anesthetic plan and risks discussed with  patient.    Use of blood products discussed with: patient whom consented to blood products.

## 2025-03-13 NOTE — OP REPORT
OPERATIVE NOTE     DATE OF PROCEDURE: 3/12/2025           PRE-OP DIAGNOSIS:  1.  Right shoulder full-thickness complete subscapularis tear  2.  Right shoulder full-thickness supra/infraspinatus tear  3.  Right shoulder SLAP tear severe biceps tendinosis subluxation/dislocation of tendon  4.  Right shoulder impingement           POST-OP DIAGNOSIS: same           PROCEDURE:   1.  Right shoulder diagnostic arthroscopy  2.  Right shoulder arthroscopic subscapularis tendon repair  3.  Right shoulder arthroscopic supra/infraspinatus tendon repair-22 modifier  3.  Right shoulder biceps tenotomy  4.  Right shoulder subacromial decompression  5.  Right shoulder extensive debridement           SURGEON: Rock Richards M.D. - Primary           ASSISTANT: Josi Salcido CFA    Surgical assistant was required for critical portions of the case including patient positioning manipulation possibly graft preparation retraction suture management wound closure as well as other critical portions of the case.  I be unable to perform these portion of the case by myself there were no other certified first assist available    ANESTHESIA: General peripheral nerve block           ESTIMATED BLOOD LOSS: Minimal                  SPECIMENS: None           COMPLICATIONS: None           CONDITION: Stable           OPERATIVE INDICATIONS AND DESCRIPTION OF PROCEDURE:     I met the patient in the preoperative holding area.  I had a full discussion with them regarding multiple options for the patient's condition including nonoperative management.  Again today I offered them nonoperative treatment modalities.  Regarding operative options I specifically I outlined right shoulder arthroscopy rotator cuff repair possibly open decompression biceps surgery subacromial decompression of the repairs as indicated. I discussed some possible complications including bleeding possibly requiring transfusion, infection, neurovascular damage, malunion, nonunion,  failure of implants, failure of surgery, chronic pain, need for revision surgery, instability, limb length discrepancy, prolonged rehab, weight bearing restrictions, DVT, PE, MI, stroke and death. After going over risks and benefits making no promises either guaranteed or implied patient elected to proceed.  At this point informed consent was signed.  A surgical marking pen was used to place my initials on the patient's right shoulder.    Patient was brought back to the operating room.   They were secured to the table with a strap. all bony prominences padded very well to prevent neuropraxia's. General anesthesia was introduced. They were placed upright in the beachchair table cspine in neutral position at all times.  Trimano arm positioner were used for arm positioning.  At this point the right upper extremity was prepped in sterile standard fashion.  At this point a timeout was performed with all parties in the room ceasing activity. Informed consent sheet was visible confirming the patient's name date of birth MRN and matched their wristband. Antibiotics were confirmed and the right upper extremity was confirmed as the correct surgical site.  My surgical initials were visible on this extremity.  At this point we were cleared to proceed with the procedure.    Exam under anesthesia revealed   Full motion no signs of instability    We began by establishing our posterior lateral viewing portal.  This was based off of the posterior lateral aspect of the acromion keeping the portal inferior and medial to the space.  Sharp dissection carried down through the skin and an atraumatic trocar was introduced in the joint.  Then at this point under needle localization we established an anterior working portal.  This was done just lateral to the coracoid.  At this point sharp dissection carried down through skin only then a atraumatic cannula was placed.  At this point we performed a diagnostic arthroscopy with the following  findings:    Degenerative change throughout the shoulder mild chondromalacia glenohumeral joint space, degenerative type labral tearing, dislocated biceps extensive tendinitis with tearing near full-thickness, completely absent retracted subscapularis tear retracted to the level of the glenoid with adhesions, large crescent-shaped tear of the entire supra and infraspinatus tendon, attrition CA ligament impingement present with large subacromial bone spur    This point we turned our attention to the extensive debridement.  Significant time was spent in the glenohumeral joint space debriding both the chondral surface of the humeral head as well as glenoid.  Time was spent debriding the labrum that was degenerative in nature.  Debrided extensive synovitis within the anterior rotator interval as well as fraying of the rotator cuff tendons.  There were a few remaining fibers of the biceps tendon these were released in a tenotomy fashion    I began by turned my attention to the subscapularis.  I established a medial viewing portal using a 70 degree scope.  Established to anterior lateral portals with cannulas.  Placed a tag stitch and upper rolled border.  Did a 360 degree release to the level of the coracoid while holding traction on the tendon.  This increased excursion was able to pull it back to the lesser tuberosity.  Began by debriding until a small soft small punctate bleeding of the lesser tuberosity.  3 fiber tack DR anchors were placed in the medial footprint of the lesser tuberosity.  Sequentially her sutures were passed in a modified speed bridge fashion through the tendon.  We then pulled it over and speed bridge fashion modified to two 4.75 swivel lock anchors laterally with appropriate tension with the arm in 30 degrees of external rotation to prevent over constraint.  Rinku of the sutures were cut    I then turned my attention to the supraspinatus infraspinatus tear.  I then reviewed laterally with a 30  degree scope.  I debrided the footprint till small punctate bleeding was appreciated placed 2 fiber tack DR anchors on the medial footprint.  All of our sutures including our tapes and sliding sutures were passed in a modified speed bridge fashion.  Sliding locking knots were tied on the anterior posterior aspect of her tear reducing the medial footprint.  All of her tapes and sliding suture tails were pulled to two 4.75 swivel lock anchors laterally.  They were punched secured into bone.  Pins of the sutures were cut this is a watertight repair that was not over constrained    I then turned to the attention to the subacromial decompression.  Using Bovie cautery we outlined the anterior lateral edge of the acromion.  Using our bur as a cutting block we removed approximately 4 mm of the anterior leading edge of the acromion.  We cut this back so that the acromion was then flush with the remaining acromion and there were no further signs of impingement.    At this point we closed all of their wounds in interrupted fashion.  Sterile bandages were placed.  Patient was placed in a shoulder abduction orthosis woken up from general anesthesia taken to PACU in stable condition.  Counts were correct x2    POSTOPERATIVE PLAN:  Weight-bearing: NWB sling at all times except to shower  Antibiotics: preop  Showering: Okay to shower postop day 4, no lotions no scrubbing's, place new dressing.  No tub soaks baths or swimming  Prescriptions: have been E prescribed  Follow-up:  in 2 weeks.  Call with any questions or concerns patient as well as his significant other have been counseled on signs of infection and to call immediately if they develop any of these  Physical Therapy: Begin gentle physical therapy 2 weeks postop, progress low as the patient had a massive repair and a 74 years old    Did meet with the patient's son Cory to discuss the surgery with them.  Counseled them on signs of infection which they understood.  If they  have any issues including any concerns with infection including redness drainage swelling pain they are to call our office immediately or go to the local emergency department    This is dictated with voice recognition software please excuse any errors

## 2025-03-13 NOTE — DISCHARGE INSTRUCTIONS
Shoulder Surgery Discharge Instructions    ACTIVITY  It is important to move your shoulder, as well as your elbow, wrist, and hand several times daily, starting the day after surgery.  You may do pendulum exercises with your operative arm starting the day after surgery.  Pendulum (dangling [place]) exercises are encouraged 2-3 times daily.  The sling will need to be removed for pendulum exercises.     NON-WEIGHT BEARING    Do not lift with your injured arm.  Do not lean into or carry anything in the injured arm.  Do not use your arm to push yourself up in bed or from a chair.  Avoid pulling and pushing with the arm on your affected side.   Follow these restrictions until cleared by your follow-up provider.     SLING / SHOULDER IMMOBILIZER:  The sling should be on at all times, except when bathing and doing your demonstrated exercises.     DRESSING AND WOUND CARE    Keep your shoulder dressing clean and dry after surgery.  Be aware that some leaking of blood or fluid from your dressing can occur and is normal. You may remove your dressing 3 days after the operation.  Notice that you have a single incision and/or several small incisions that have been closed with stitches.  Cover each of these incisions with a light dressing or band-aids.  This keeps the surgical incisions clean, as well as preventing your clothes from spotting with blood or fluid.  Change band-aids or light dressing daily.    SHOWER AND BATHING  Keep the shoulder dry for 3 days after your surgery.  Then, you may shower. You may let soap and water run over skin incisions, but do not immerse your shoulder in water.  No swimming pools, hot tubs, or baths are recommended until after your follow up and then only if cleared by your surgeon.     ICE  Apply an ice pack to your shoulder (15 minutes on the shoulder, 15 minutes off the shoulder), as you feel necessary to help with the pain and swelling.        FOLLOW-UP  Make sure that you have an appointment  7-14 days following surgery.Your procedure/rehabilitation will be discussed and physical therapy may be prescribed at that time.     If any questions arise, call your provider.  If your provider is not available, please feel free to call the Surgical Center at [phone].    MEDICATIONS: Resume taking daily medication.  Take prescribed pain medication with food.  If no medication is prescribed, you may take non-aspirin pain medication if needed.  PAIN MEDICATION CAN BE VERY CONSTIPATING.  Take a stool softener or laxative such as senokot, pericolace, or milk of magnesia if needed.    Last pain medication given at     What to Expect Post Anesthesia    Rest and take it easy for the first 24 hours.  A responsible adult is recommended to remain with you during that time.  It is normal to feel sleepy.  We encourage you to not do anything that requires balance, judgment or coordination.    FOR 24 HOURS DO NOT:  Drive, operate machinery or run household appliances.  Drink beer or alcoholic beverages.  Make important decisions or sign legal documents.    To avoid nausea, slowly advance diet as tolerated, avoiding spicy or greasy foods for the first day.  Add more substantial food to your diet according to your provider's instructions.  Babies can be fed formula or breast milk as soon as they are hungry.  INCREASE FLUIDS AND FIBER TO AVOID CONSTIPATION.    MILD FLU-LIKE SYMPTOMS ARE NORMAL.  YOU MAY EXPERIENCE GENERALIZED MUSCLE ACHES, THROAT IRRITATION, HEADACHE AND/OR SOME NAUSEA.

## 2025-03-13 NOTE — OR NURSING
1814: Patient arrived to PACU from OR via gurney. Report received from anesthesia and RN. Cold pack applied.     1817: family updated    1914: pt meets criteria for transfer to stage II. Report called to receiving RN    1935: IV removed. Discharge instructions discussed with family member and patient. All questions answered. Pt wheelchaired to family member's vehicle.

## 2025-03-13 NOTE — ANESTHESIA TIME REPORT
Anesthesia Start and Stop Event Times       Date Time Event    3/12/2025 1451 Ready for Procedure     1523 Anesthesia Start     1815 Anesthesia Stop          Responsible Staff  03/12/25      Name Role Begin End    Cory Alford M.D. Anesth 1523 1815          Overtime Reason:  overtime    Comments:

## 2025-03-14 NOTE — ANESTHESIA POSTPROCEDURE EVALUATION
Patient: Audrey Aquino    Procedure Summary       Date: 03/12/25 Room / Location:  OR 03 / SURGERY [facility]    Anesthesia Start: 1523 Anesthesia Stop: 1815    Procedure: RIGHT SHOULDER ARTHROSCOPY, ROTATOR CUFF REPAIR, SUBACROMIAL DECOMPRESSION, EXTENSIVE DEBRIDEMENT (Right: Shoulder) Diagnosis: (DISORDER OF ROTATOR CUFF)    Surgeons: Rock Richards M.D. Responsible Provider: Cory Alford M.D.    Anesthesia Type: general, peripheral nerve block ASA Status: 3            Final Anesthesia Type: general, peripheral nerve block  Last vitals  BP   Blood Pressure : 111/67    Temp   36.2 °C (97.2 °F)    Pulse   90   Resp   15    SpO2   94 %      Anesthesia Post Evaluation    Patient location during evaluation: PACU  Patient participation: complete - patient participated  Level of consciousness: awake and alert    Airway patency: patent  Anesthetic complications: no  Cardiovascular status: hemodynamically stable  Respiratory status: acceptable  Hydration status: euvolemic    PONV: none          There were no known notable events for this encounter.     Nurse Pain Score: 0 (NPRS)

## 2025-03-24 ENCOUNTER — TELEPHONE (OUTPATIENT)
Dept: VASCULAR LAB | Facility: MEDICAL CENTER | Age: 75
End: 2025-03-24
Payer: MEDICARE

## 2025-03-24 DIAGNOSIS — E78.2 MIXED HYPERLIPIDEMIA: ICD-10-CM

## 2025-03-24 DIAGNOSIS — E78.1 HYPERTRIGLYCERIDEMIA: ICD-10-CM

## 2025-03-24 NOTE — TELEPHONE ENCOUNTER
Established patient  Chart prep for upcoming appointment.    Any pending/incomplete orders from last visit? Yes Labs  Was patient called and reminded to complete pending orders? Yes  Were any records requested?  No    Referral up to date? Yes renewal ordered, sent to provider to co-sign, AND new referral attached to upcoming appointment.    Referral attached to appointment (renewals and New patients only)? Yes renewal ordered and sent to provider to co-sign   Virtual appointment? No    Sharri Rojo, Med Ass't  [facility]  Ph. [phone]  Fx. [phone]

## 2025-03-24 NOTE — Clinical Note
REFERRAL APPROVAL NOTICE         Sent on March 24, 2025                   Audrey Aquino  [address] [address] [address]                   Dear Ms. Aquino,    After a careful review of the medical information and benefit coverage, [facility] has processed your referral. See below for additional details.    If applicable, you must be actively enrolled with your insurance for coverage of the authorized service. If you have any questions regarding your coverage, please contact your insurance directly.    REFERRAL INFORMATION   Referral #:  [id number]  Referred-To Department    Referred-By Provider:  Vascular Medicine    Michael J Bloch, M.D.   Vascular Medicine      [address] [place] [id number]  [phone] [address] [place] [id number]  [phone]    Referral Start Date:  03/24/2025  Referral End Date:   03/24/2026             SCHEDULING  If you do not already have an appointment, please call [phone] to make an appointment.     MORE INFORMATION  If you do not already have a [employer] account, sign up at: [email or web address]  You can access your medical information, make appointments, see lab results, billing information, and more.  If you have questions regarding this referral, please contact  the [facility] Referrals department at:             [phone]. Monday - Friday 8:00AM - 5:00PM.     Sincerely,    [facility]

## 2025-03-25 ENCOUNTER — HOSPITAL ENCOUNTER (OUTPATIENT)
Dept: LAB | Facility: MEDICAL CENTER | Age: 75
End: 2025-03-25
Attending: INTERNAL MEDICINE
Payer: MEDICARE

## 2025-03-25 DIAGNOSIS — E78.2 MIXED HYPERLIPIDEMIA: ICD-10-CM

## 2025-03-25 DIAGNOSIS — I25.10 CORONARY ARTERY DISEASE INVOLVING NATIVE CORONARY ARTERY OF NATIVE HEART WITHOUT ANGINA PECTORIS: ICD-10-CM

## 2025-03-25 LAB
ALBUMIN SERPL BCP-MCNC: 4.6 G/DL (ref 3.2–4.9)
ALBUMIN/GLOB SERPL: 1.6 G/DL
ALP SERPL-CCNC: 95 U/L (ref 30–99)
ALT SERPL-CCNC: 20 U/L (ref 2–50)
ANION GAP SERPL CALC-SCNC: 12 MMOL/L (ref 7–16)
AST SERPL-CCNC: 30 U/L (ref 12–45)
BILIRUB SERPL-MCNC: 0.5 MG/DL (ref 0.1–1.5)
BUN SERPL-MCNC: 17 MG/DL (ref 8–22)
CALCIUM ALBUM COR SERPL-MCNC: 9.7 MG/DL (ref 8.5–10.5)
CALCIUM SERPL-MCNC: 10.2 MG/DL (ref 8.5–10.5)
CHLORIDE SERPL-SCNC: 103 MMOL/L (ref 96–112)
CHOLEST SERPL-MCNC: 178 MG/DL (ref 100–199)
CO2 SERPL-SCNC: 23 MMOL/L (ref 20–33)
CREAT SERPL-MCNC: 0.86 MG/DL (ref 0.5–1.4)
CRP SERPL HS-MCNC: 7.2 MG/L (ref 0–3)
ERYTHROCYTE [DISTWIDTH] IN BLOOD BY AUTOMATED COUNT: 41.4 FL (ref 35.9–50)
GFR SERPLBLD CREATININE-BSD FMLA CKD-EPI: 71 ML/MIN/1.73 M 2
GLOBULIN SER CALC-MCNC: 2.9 G/DL (ref 1.9–3.5)
GLUCOSE SERPL-MCNC: 74 MG/DL (ref 65–99)
HCT VFR BLD AUTO: 43.9 % (ref 37–47)
HDLC SERPL-MCNC: 74 MG/DL
HGB BLD-MCNC: 14.3 G/DL (ref 12–16)
LDLC SERPL CALC-MCNC: 86 MG/DL
MCH RBC QN AUTO: 28.5 PG (ref 27–33)
MCHC RBC AUTO-ENTMCNC: 32.6 G/DL (ref 32.2–35.5)
MCV RBC AUTO: 87.6 FL (ref 81.4–97.8)
PLATELET # BLD AUTO: 416 K/UL (ref 164–446)
PMV BLD AUTO: 9.5 FL (ref 9–12.9)
POTASSIUM SERPL-SCNC: 4.3 MMOL/L (ref 3.6–5.5)
PROT SERPL-MCNC: 7.5 G/DL (ref 6–8.2)
RBC # BLD AUTO: 5.01 M/UL (ref 4.2–5.4)
SODIUM SERPL-SCNC: 138 MMOL/L (ref 135–145)
TRIGL SERPL-MCNC: 92 MG/DL (ref 0–149)
WBC # BLD AUTO: 6.6 K/UL (ref 4.8–10.8)

## 2025-03-25 PROCEDURE — 80053 COMPREHEN METABOLIC PANEL: CPT

## 2025-03-25 PROCEDURE — 36415 COLL VENOUS BLD VENIPUNCTURE: CPT

## 2025-03-25 PROCEDURE — 85027 COMPLETE CBC AUTOMATED: CPT

## 2025-03-25 PROCEDURE — 86141 C-REACTIVE PROTEIN HS: CPT

## 2025-03-25 PROCEDURE — 83695 ASSAY OF LIPOPROTEIN(A): CPT

## 2025-03-25 PROCEDURE — 82172 ASSAY OF APOLIPOPROTEIN: CPT

## 2025-03-25 PROCEDURE — 80061 LIPID PANEL: CPT

## 2025-03-26 ENCOUNTER — RESULTS FOLLOW-UP (OUTPATIENT)
Dept: CARDIOLOGY | Facility: MEDICAL CENTER | Age: 75
End: 2025-03-26
Payer: MEDICARE

## 2025-03-26 LAB
APO B100 SERPL-MCNC: 78 MG/DL (ref 60–117)
LPA SERPL-MCNC: 68 MG/DL

## 2025-04-03 ENCOUNTER — OFFICE VISIT (OUTPATIENT)
Dept: CARDIOLOGY | Facility: MEDICAL CENTER | Age: 75
End: 2025-04-03
Attending: INTERNAL MEDICINE
Payer: MEDICARE

## 2025-04-03 VITALS
DIASTOLIC BLOOD PRESSURE: 65 MMHG | SYSTOLIC BLOOD PRESSURE: 101 MMHG | WEIGHT: 130 LBS | HEIGHT: 61 IN | BODY MASS INDEX: 24.55 KG/M2 | HEART RATE: 62 BPM

## 2025-04-03 DIAGNOSIS — I25.10 CORONARY ARTERY DISEASE INVOLVING NATIVE CORONARY ARTERY OF NATIVE HEART WITHOUT ANGINA PECTORIS: ICD-10-CM

## 2025-04-03 DIAGNOSIS — E78.1 HYPERTRIGLYCERIDEMIA: ICD-10-CM

## 2025-04-03 DIAGNOSIS — E78.2 MIXED HYPERLIPIDEMIA: ICD-10-CM

## 2025-04-03 DIAGNOSIS — E78.41 HIGH SERUM LIPOPROTEIN(A): ICD-10-CM

## 2025-04-03 PROCEDURE — G2211 COMPLEX E/M VISIT ADD ON: HCPCS | Performed by: INTERNAL MEDICINE

## 2025-04-03 PROCEDURE — 3078F DIAST BP <80 MM HG: CPT | Performed by: INTERNAL MEDICINE

## 2025-04-03 PROCEDURE — 99212 OFFICE O/P EST SF 10 MIN: CPT

## 2025-04-03 PROCEDURE — 3074F SYST BP LT 130 MM HG: CPT | Performed by: INTERNAL MEDICINE

## 2025-04-03 PROCEDURE — 99214 OFFICE O/P EST MOD 30 MIN: CPT | Performed by: INTERNAL MEDICINE

## 2025-04-03 ASSESSMENT — FIBROSIS 4 INDEX: FIB4 SCORE: 1.18

## 2025-04-03 NOTE — PROGRESS NOTES
[facility] - Follow up VISIT   04/03/25    Audrey Aquino has been referred for evaluation and management of dyslipidemia  Referral Source: Aamir Bacon M.D.   Date First Established in Clinic:     Subjective      Had a fall and injured shoulder.  Had surgery without cardiovascular complication    CURRENT MED MGMT  Current Lipid Lowering Meds:   Statin: none -was unable to tolerate rosuvastatin even 2 days a week  Non-Statin: Fenofibrate, ezetimibe  Supplements: None  Current adverse drug reactions/side effects? Yes - mild myalgas  Adherence? yes    LIFESTYLE MGMT  Change in weight: 30 lb down since starting   Exercise habits: walks or swims 40-45 min,   Dietary patterns: lots of protein; low calories  Etoh: see sochx  Barriers to care/SDOH: none  Tobacco:  reports that she has never smoked. She has never used smokeless tobacco.     PERTINENT HLD PMHX  Age at Initial Diagnosis of Dyslipidemia: 40s - first high trigs  Baseline Lipids Prior to Treatment:   Lab Results   Component Value Date/Time    CHOLSTRLTOT 178 03/25/2025 08:47 AM    LDL 86 03/25/2025 08:47 AM    HDL 74 03/25/2025 08:47 AM    TRIGLYCERIDE 92 03/25/2025 08:47 AM       History of ASCVD: None  Other Established (non-atherosclerotic) Vascular Disease, if Present: None  Secondary causes of dyslipidemia:  Endocrine/Hypothyroidism:  no  Liver disease: none reported   Renal disease/nephrotic syndrome:  none reported  Dietary-induced (ketogenic, lean mass hyper-responder)? no  Medications: Estrogen  Previously Attempted Interventions for Lipids - including outcome  Statin: lova, simva, atorva    Outcome: lifestyle limited muscle pain  Non-Statin: none  Outcome: not applicable    OTHER CARDIOVASCULAR RISK FACTORS  Antithrombotic therapy: none - easy bruising  Blood pressure: normal always  Dysglycemia: started on ozempic a year ago for weight loss - down 30 pounds.  Now on 1 mg a week.  Weight stabilized     Family History   Problem  "Relation Age of Onset    Cancer Mother         breast    Heart Attack Father     Heart Disease Father      Social History     Tobacco Use    Smoking status: Never    Smokeless tobacco: Never   Vaping Use    Vaping status: Never Used   Substance Use Topics    Alcohol use: Yes     Comment: 2-3 drinks a week     Drug use: Yes     Types: Marijuana     Comment: indica tablets for sleep         Objective    Vitals:    04/03/25 1523   BP: 101/65   BP Location: Left arm   Patient Position: Sitting   BP Cuff Size: Adult   Pulse: 62   Weight: 59 kg (130 lb)   Height: 1.549 m (5' 1\")      BMI Readings from Last 1 Encounters:   04/03/25 24.56 kg/m²      Wt Readings from Last 3 Encounters:   04/03/25 59 kg (130 lb)   03/12/25 59.4 kg (130 lb 15.3 oz)   03/06/25 60.7 kg (133 lb 12.8 oz)     BP Readings from Last 5 Encounters:   04/03/25 101/65   03/12/25 111/67   03/06/25 108/64   01/31/25 107/59   11/25/24 106/62     Physical Exam  Vitals reviewed.   Constitutional:       General: She is not in acute distress.     Appearance: She is not diaphoretic.   HENT:      Head: Normocephalic and atraumatic.   Eyes:      General: No scleral icterus.     Conjunctiva/sclera: Conjunctivae normal.   Neck:      Vascular: No carotid bruit.   Cardiovascular:      Rate and Rhythm: Normal rate and regular rhythm.      Heart sounds: Normal heart sounds. No murmur heard.  Pulmonary:      Effort: Pulmonary effort is normal. No respiratory distress.      Breath sounds: Normal breath sounds. No wheezing or rales.   Musculoskeletal:      Right lower leg: No edema.      Left lower leg: No edema.   Skin:     Coloration: Skin is not pale.   Neurological:      General: No focal deficit present.      Mental Status: She is alert and oriented to person, place, and time.      Cranial Nerves: No cranial nerve deficit.      Coordination: Coordination normal.      Gait: Gait is intact. Gait normal.   Psychiatric:         Mood and Affect: Mood and affect normal.    "      Behavior: Behavior normal.       DATA REVIEW:  Most Recent Lipid Panel:   Lab Results   Component Value Date/Time    CHOLSTRLTOT 178 03/25/2025 08:47 AM    LDL 86 03/25/2025 08:47 AM    HDL 74 03/25/2025 08:47 AM    TRIGLYCERIDE 92 03/25/2025 08:47 AM     Lab Results   Component Value Date/Time    LDL 86 03/25/2025 08:47 AM    LDL 78 08/13/2024 09:18 AM    LDL 93 03/06/2024 09:47 AM    LDL 91 10/04/2023 11:52 AM     (H) 04/06/2022 09:13 AM      Lab Results   Component Value Date/Time    LIPOPROTA 68 (H) 03/25/2025 08:47 AM      Lab Results   Component Value Date/Time    APOB 78 03/25/2025 08:47 AM      Lab Results   Component Value Date/Time    CRPHIGHSEN 7.2 (H) 03/25/2025 08:47 AM       Other Pertinent Blood Work:   Lab Results   Component Value Date    SODIUM 138 03/25/2025    POTASSIUM 4.3 03/25/2025    CHLORIDE 103 03/25/2025    CO2 23 03/25/2025    ANION 12.0 03/25/2025    GLUCOSE 74 03/25/2025    BUN 17 03/25/2025    CREATININE 0.86 03/25/2025    CALCIUM 10.2 03/25/2025    ASTSGOT 30 03/25/2025    ALTSGPT 20 03/25/2025    ALKPHOSPHAT 95 03/25/2025    TBILIRUBIN 0.5 03/25/2025    ALBUMIN 4.6 03/25/2025    AGRATIO 1.6 03/25/2025    TSHULTRASEN 2.030 10/04/2023     VASCULAR IMAGING:    Coronary CTA nov 2023  CAC Score: 209  Mild plaque LAD and RCA    CIMT and vascular screen March 2025   1. Normal ankle brachial indices.   2. No abdominal aortic aneurysm.   3. No hemodynamically significant carotid stenosis.  Mean CIMT 0.739        ASSESSMENT AND PLAN  1. Coronary artery disease involving native coronary artery of native heart without angina pectoris        2. Mixed hyperlipidemia  Bempedoic Acid 180 MG Tab    APOLIPOPROTEIN B    Lipid Profile    Comp Metabolic Panel    CRP HIGH SENSITIVE (CARDIAC)      3. High serum lipoprotein(a)        4. Hypertriglyceridemia          Patient Type, check all that apply: High risk primary prevention    Major ASCVD events: None, but does have subclinical coronary  artery disease    Established (non-atherosclerotic) Vascular Disease: None    Secondary causes/contributors: None    Evidence of genetic dyslipidemia: No   FH genotyping: NO    ACC/AHA Indication for Statin Therapy:  Established ASCVD: Indication for High intensity statin     ASCVD risk calculations  The 10-year ASCVD risk score (Aisha RODRIGUEZ, et al., 2019) is: 8.1%, but this is obviously an underestimation given her CAC score    Other Significant Risk Markers:  High-risk conditions: None   Risk-enhancers:   Lp(a) >50mg/dL (>125 nmol/L)  HDL and triglycerides much improved  CIMT relatively favorable  CRP elevated but likely due to recent orthopedic injury    Goal LDL-C and ApolipoproteinB/non-HDL-C:  LDL-C <70 mg/dl  apoB <70 mg/dl  At goals? no    LIFESTYLE INTERVENTIONS  TOBACCO:  reports that she has never smoked. She has never used smokeless tobacco.   - continued complete avoidance of all tobacco products   PHYSICAL ACTIVITY:  Continue excellent exercise habits with continued focus on resistance training in addition to aerobic exercise  NUTRITION: Maintain 30 pound weight loss.  Keep up her protein intake  ETOH: limit to 1 or less standard drinks/day  WT MGMT: maintain current healthy weight -reasonable to continue Ozempic at present    LIPID-LOWERING MEDICATION MANAGEMENT:     Statin Therapy:   Patient is been unable to tolerate multiple other statins in the past including atorvastatin simvastatin and pravastatin  Unable to tolerate rosuvastatin even 2 days a week  -Would not rechallenge with statin therapy    Non-Statin Meds:   -Continue fenofibrate for now although as we discussed if her triglycerides remain under decent control I think we can discontinue it  -Continue ezetimibe 10 mg a day  -Add bempedoic acid 180 mg daily -sent to pharmacy benefits managers to help with PA    PCSK9 Inhibitor strategy indications: Not currently indicated but given ASCVD would consider if LDL remains above target despite  maximum tolerated oral therapy    Recommended Supplements: None     BLOOD PRESSURE MANAGEMENT:  ACC/AHA goal <130/80  Patient has been normotensive  -Continue to follow blood pressure in the office    GLYCEMIC STATUS: Normal  -Continue lifestyle modification    ANTITHROMBOTIC THERAPY -especially if she is going to continue on HRT I think the benefit of aspirin outweighs the risk.  Using shared decision making we decided to start with  -Aspirin 81 mg 3 days a week    OTHER    # GÓMEZ -continue oral appliance and CPAP.  Defer further management to PCP and pulmonary/sleep medicine    # HRT -previously discussed the risk of atherothrombosis in a patient at this age with established atherosclerosis.  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      Studies Ordered at Todays Visit: Repeat CIMT and vascular screen in 1 year -spring 2026  Blood Work To Be Obtained Prior to Next Visit: as noted above  Follow-Up: 12 weeks    Time: 32 min - - chart review/prep, review of other providers' records, imaging/lab review, face-to-face time for history/examination, ordering, prescribing,  review of results/meds/ treatment plan with patient/family/caregiver, documentation in EMR, care coordination (as needed)      Michael J Bloch, M.D.  [facility], Board-certified clinical lipidologist   [facility]   [facility]   [phone]      CC:  Melany Garcia M.D.

## 2025-04-09 ENCOUNTER — TELEPHONE (OUTPATIENT)
Dept: VASCULAR LAB | Facility: MEDICAL CENTER | Age: 75
End: 2025-04-09
Payer: MEDICARE

## 2025-04-09 NOTE — TELEPHONE ENCOUNTER
"Received New Start request via  EMR    for NEXLETOL 180MG TABLETS . (Quantity:30, Day Supply:30)     Insurance: [employer]   Member ID:  [id number]  BIN: [id number]  PCN: [id number]  Group: [id number]     Ran Test claim via [place] & medication  rejects stating \"refill too soon 06/16/2025. Last fill on 04/03/2025\". Called [facility] pharmacy @ [phone] and s/w Femi to obtain medication delivery status for Nexletol. Per Femi, it was already been shipped out on 04/04 for $10/90DS and should be delivered to pt's home address on or before 04/11.        Updated and notified provider for the status.     LUCINA Reza, PhT  Vascular Pharmacy Liaison (Rx Coordinator)  P: [phone]  4/9/2025 9:43 AM          "

## 2025-04-15 ENCOUNTER — DOCUMENTATION (OUTPATIENT)
Dept: VASCULAR LAB | Facility: MEDICAL CENTER | Age: 75
End: 2025-04-15
Payer: MEDICARE

## 2025-07-24 ENCOUNTER — APPOINTMENT (OUTPATIENT)
Dept: LAB | Facility: MEDICAL CENTER | Age: 75
End: 2025-07-24
Payer: MEDICARE

## 2025-08-05 ENCOUNTER — HOSPITAL ENCOUNTER (OUTPATIENT)
Dept: LAB | Facility: MEDICAL CENTER | Age: 75
End: 2025-08-05
Attending: INTERNAL MEDICINE
Payer: MEDICARE

## 2025-08-05 DIAGNOSIS — E78.2 MIXED HYPERLIPIDEMIA: ICD-10-CM

## 2025-08-05 LAB
ALBUMIN SERPL BCP-MCNC: 4.4 G/DL (ref 3.2–4.9)
ALBUMIN/GLOB SERPL: 2.1 G/DL
ALP SERPL-CCNC: 76 U/L (ref 30–99)
ALT SERPL-CCNC: 23 U/L (ref 2–50)
ANION GAP SERPL CALC-SCNC: 11 MMOL/L (ref 7–16)
AST SERPL-CCNC: 33 U/L (ref 12–45)
BILIRUB SERPL-MCNC: 0.4 MG/DL (ref 0.1–1.5)
BUN SERPL-MCNC: 20 MG/DL (ref 8–22)
CALCIUM ALBUM COR SERPL-MCNC: 9.2 MG/DL (ref 8.5–10.5)
CALCIUM SERPL-MCNC: 9.5 MG/DL (ref 8.5–10.5)
CHLORIDE SERPL-SCNC: 103 MMOL/L (ref 96–112)
CHOLEST SERPL-MCNC: 170 MG/DL (ref 100–199)
CO2 SERPL-SCNC: 23 MMOL/L (ref 20–33)
CREAT SERPL-MCNC: 1.04 MG/DL (ref 0.5–1.4)
CRP SERPL HS-MCNC: 2.6 MG/L (ref 0–3)
GFR SERPLBLD CREATININE-BSD FMLA CKD-EPI: 56 ML/MIN/1.73 M 2
GLOBULIN SER CALC-MCNC: 2.1 G/DL (ref 1.9–3.5)
GLUCOSE SERPL-MCNC: 76 MG/DL (ref 65–99)
HDLC SERPL-MCNC: 63 MG/DL
LDLC SERPL CALC-MCNC: 90 MG/DL
POTASSIUM SERPL-SCNC: 4.2 MMOL/L (ref 3.6–5.5)
PROT SERPL-MCNC: 6.5 G/DL (ref 6–8.2)
SODIUM SERPL-SCNC: 137 MMOL/L (ref 135–145)
TRIGL SERPL-MCNC: 83 MG/DL (ref 0–149)

## 2025-08-05 PROCEDURE — 80053 COMPREHEN METABOLIC PANEL: CPT

## 2025-08-05 PROCEDURE — 86141 C-REACTIVE PROTEIN HS: CPT

## 2025-08-05 PROCEDURE — 36415 COLL VENOUS BLD VENIPUNCTURE: CPT

## 2025-08-05 PROCEDURE — 80061 LIPID PANEL: CPT

## 2025-08-05 PROCEDURE — 82172 ASSAY OF APOLIPOPROTEIN: CPT

## 2025-08-06 ENCOUNTER — TELEPHONE (OUTPATIENT)
Dept: VASCULAR LAB | Facility: MEDICAL CENTER | Age: 75
End: 2025-08-06
Payer: MEDICARE

## 2025-08-06 ENCOUNTER — RESULTS FOLLOW-UP (OUTPATIENT)
Dept: CARDIOLOGY | Facility: MEDICAL CENTER | Age: 75
End: 2025-08-06
Payer: MEDICARE

## 2025-08-06 LAB — APO B100 SERPL-MCNC: 80 MG/DL (ref 60–117)

## 2025-08-12 ENCOUNTER — TELEMEDICINE (OUTPATIENT)
Facility: MEDICAL CENTER | Age: 75
End: 2025-08-12
Attending: INTERNAL MEDICINE
Payer: MEDICARE

## 2025-08-12 DIAGNOSIS — I25.10 CORONARY ARTERY DISEASE INVOLVING NATIVE CORONARY ARTERY OF NATIVE HEART WITHOUT ANGINA PECTORIS: Primary | ICD-10-CM

## 2025-08-12 DIAGNOSIS — E78.41 HIGH SERUM LIPOPROTEIN(A): ICD-10-CM

## 2025-08-12 DIAGNOSIS — E78.2 MIXED HYPERLIPIDEMIA: ICD-10-CM

## 2025-08-12 PROCEDURE — 99214 OFFICE O/P EST MOD 30 MIN: CPT | Mod: 95 | Performed by: INTERNAL MEDICINE

## 2025-08-12 PROCEDURE — G2211 COMPLEX E/M VISIT ADD ON: HCPCS | Mod: 95 | Performed by: INTERNAL MEDICINE

## 2025-08-13 ENCOUNTER — SPECIALTY PHARMACY (OUTPATIENT)
Dept: CARDIOLOGY | Facility: MEDICAL CENTER | Age: 75
End: 2025-08-13
Payer: MEDICARE

## 2025-08-15 DIAGNOSIS — E78.2 MIXED HYPERLIPIDEMIA: ICD-10-CM

## 2025-08-15 DIAGNOSIS — I25.10 CORONARY ARTERY DISEASE INVOLVING NATIVE CORONARY ARTERY OF NATIVE HEART WITHOUT ANGINA PECTORIS: ICD-10-CM

## 2025-08-26 ENCOUNTER — OFFICE VISIT (OUTPATIENT)
Dept: SLEEP MEDICINE | Facility: MEDICAL CENTER | Age: 75
End: 2025-08-26
Attending: STUDENT IN AN ORGANIZED HEALTH CARE EDUCATION/TRAINING PROGRAM
Payer: MEDICARE

## 2025-08-26 VITALS
HEART RATE: 62 BPM | WEIGHT: 129.1 LBS | OXYGEN SATURATION: 98 % | BODY MASS INDEX: 24.37 KG/M2 | RESPIRATION RATE: 16 BRPM | DIASTOLIC BLOOD PRESSURE: 64 MMHG | HEIGHT: 61 IN | SYSTOLIC BLOOD PRESSURE: 106 MMHG

## 2025-08-26 DIAGNOSIS — G47.33 OSA (OBSTRUCTIVE SLEEP APNEA): Primary | ICD-10-CM

## 2025-08-26 PROCEDURE — 99214 OFFICE O/P EST MOD 30 MIN: CPT | Performed by: STUDENT IN AN ORGANIZED HEALTH CARE EDUCATION/TRAINING PROGRAM

## 2025-08-26 PROCEDURE — 3078F DIAST BP <80 MM HG: CPT | Performed by: STUDENT IN AN ORGANIZED HEALTH CARE EDUCATION/TRAINING PROGRAM

## 2025-08-26 PROCEDURE — 99213 OFFICE O/P EST LOW 20 MIN: CPT | Performed by: STUDENT IN AN ORGANIZED HEALTH CARE EDUCATION/TRAINING PROGRAM

## 2025-08-26 PROCEDURE — 3074F SYST BP LT 130 MM HG: CPT | Performed by: STUDENT IN AN ORGANIZED HEALTH CARE EDUCATION/TRAINING PROGRAM

## 2025-08-26 ASSESSMENT — FIBROSIS 4 INDEX: FIB4 SCORE: 1.22

## (undated) DEVICE — DRAPE IOBAN II INCISE 23X17 - (10EA/BX 4BX/CA)

## (undated) DEVICE — TUBING PATIENT W/CONNECTOR REDEUCE (1EA)

## (undated) DEVICE — CANNULA GEMINI PASSPORT 20MM - (5/BX)

## (undated) DEVICE — DRAPE SURGICAL U 77X120 - (10/CA)

## (undated) DEVICE — SUTURE 3-0 ETHILON FS-1 - (36/BX) 30 INCH

## (undated) DEVICE — SENSOR OXIMETER ADULT SPO2 RD SET (20EA/BX)

## (undated) DEVICE — NEEDLE MULTIFIRE (5EA/BX)

## (undated) DEVICE — SODIUM CHL. IRRIGATION 0.9% 3000ML (4EA/CA 65CA/PF)

## (undated) DEVICE — STOCKINETTE IMPERVIOUS 6 SM (30EA/CA)

## (undated) DEVICE — SUTURE GENERAL

## (undated) DEVICE — SUTURE 3-0 VICRYL PLUS CT-1 - 8 X 18 INCH (12/BX)

## (undated) DEVICE — TUBE CONNECT SUCTION CLEAR 120 X 1/4" (50EA/CA)"

## (undated) DEVICE — CANNULA TWIST IN 6MM X 7CM (5EA/BX)

## (undated) DEVICE — TUBING PUMP WITH CONNECTOR REDEUCE (1EA)

## (undated) DEVICE — Device

## (undated) DEVICE — DRAPE SHOULDER FLUID CONTROL - 77 X 85 (10/CA)

## (undated) DEVICE — GLOVE BIOGEL INDICATOR SZ 8 SURGICAL PF LTX - (50/BX 4BX/CA)

## (undated) DEVICE — DRAPE LARGE 3 QUARTER - (20/CA)

## (undated) DEVICE — CLOSURE SKIN STRIP 1/2 X 4 IN - (STERI STRIP) (50/BX 4BX/CA)

## (undated) DEVICE — DRAPE U SPLIT IMP 54 X 76 - (24/CA)

## (undated) DEVICE — SUTURE 3-0 MONOCRYL PLUS PS-1 - 27 INCH (36/BX)

## (undated) DEVICE — PROBE ELECTROSURGICAL APOLLORF MP90

## (undated) DEVICE — TOWEL STOP TIMEOUT SAFETY FLAG (40EA/CA)

## (undated) DEVICE — CHLORAPREP 26 ML APPLICATOR - ORANGE TINT(25/CA)

## (undated) DEVICE — GLOVE SURGICAL PROTEXIS PI 8.0 LF - (50PR/BX)

## (undated) DEVICE — SLEEVE VASO DVT COMPRESSION CALF MED - (10PR/CA)

## (undated) DEVICE — PACK SHOULDER ARTHROSCOPY SM - (2EA/CA)